# Patient Record
Sex: FEMALE | Race: WHITE | NOT HISPANIC OR LATINO | Employment: PART TIME | URBAN - METROPOLITAN AREA
[De-identification: names, ages, dates, MRNs, and addresses within clinical notes are randomized per-mention and may not be internally consistent; named-entity substitution may affect disease eponyms.]

---

## 2017-12-22 ENCOUNTER — ALLSCRIPTS OFFICE VISIT (OUTPATIENT)
Dept: OTHER | Facility: OTHER | Age: 32
End: 2017-12-22

## 2017-12-22 DIAGNOSIS — R05.9 COUGH: ICD-10-CM

## 2017-12-24 ENCOUNTER — APPOINTMENT (OUTPATIENT)
Dept: RADIOLOGY | Facility: MEDICAL CENTER | Age: 32
End: 2017-12-24
Payer: COMMERCIAL

## 2017-12-24 ENCOUNTER — TRANSCRIBE ORDERS (OUTPATIENT)
Dept: ADMINISTRATIVE | Facility: HOSPITAL | Age: 32
End: 2017-12-24

## 2017-12-24 DIAGNOSIS — R05.9 COUGH: ICD-10-CM

## 2017-12-24 PROCEDURE — 71020 HB CHEST X-RAY 2VW FRONTAL&LATL: CPT

## 2017-12-27 NOTE — PROGRESS NOTES
Assessment   1  Anterior pleuritic pain (786 52) (R07 81)   2  Cough (786 2) (R05)    Plan   Anterior pleuritic pain, Cough    · Dulera 100-5 MCG/ACT Inhalation Aerosol; INHALE 1 PUFFS Twice daily  Cough    · * XR CHEST PA & LATERAL; Status:Resulted - Requires Verification;   Done: [id number]    02:52PM  Flu vaccine need    · Stop: Fluzone Quadrivalent Intramuscular Suspension    Discussion/Summary      Patient is here for concern of cough for greater than one month and pain in upper left chest with deep breathes  I gave her a Dulera inhaler to use - two puffs twice a day  She can take Alleve twice a day  I also ordered an xray to r/o pneumonia  The treatment plan was reviewed with the patient/guardian  The patient/guardian understands and agrees with the treatment plan      Chief Complaint   1  Cold Symptoms  pt complains of chest congestion/cough and states her lungs hurt for about a week      History of Present Illness   HPI: Patient is her for cough and pain on chest  Had a cold, then went into her lungs  Cough has improved  Now she has pain on the upper chest on the left - is sore when she lays down or taking a deep breath  Cold Symptoms: [address] presents with complaints of cold symptoms  Associated symptoms include dry cough  Review of Systems        Constitutional: No fever, no chills, feels well, no tiredness, no recent weight gain or loss  Cardiovascular: no complaints of slow or fast heart rate, no chest pain, no palpitations, no leg claudication or lower extremity edema  Respiratory: cough-- and-- chest pain, but-- as noted in HPI  Gastrointestinal: no complaints of abdominal pain, no constipation, no nausea or diarrhea, no vomiting, no bloody stools  Musculoskeletal: no complaints of arthralgia, no myalgia, no joint swelling or stiffness, no limb pain or swelling  Integumentary: no complaints of skin rash or lesion, no itching or dry skin, no skin wounds  Active Problems   1  Chronic tension headaches (339 12) (G44 229)   2  Depression (311) (F32 9)   3  Fatigue (780 79) (R53 83)   4  Hair loss (704 00) (L65 9)    Past Medical History   1  History of Acute otitis externa, unspecified laterality   2  History of Anxiety (300 00) (F41 9)   3  History of Candidiasis (112 9) (B37 9)   4  History of Cold intolerance (780 99) (R68 89)   5  History of Ear itching (698 9) (L29 9)   6  History of Encounter for fetal anatomic survey (V28 81) (Z36 89)   7  History of Encounter for routine gynecological examination (V72 31) (Z01 419)   8  History of Flu vaccine need (V04 81) (Z23)   9  History of allergy (V15 09) (Z88 9)   10  History of headache (V13 89) (Z87 898)   11  History of pregnancy (V13 29)   12  History of Pelvic pressure in female (625 8) (R10 2)   13  History of Postpartum exam (V24 2) (Z39 2)   14  History of Screening, , for risk of pre-term labor (V28 82) (Z36 86)   15  Suspected problem with fetal growth not found (V89 04) (Z03 74)   16  History of Vomiting During Pregnancy (643 90)  Active Problems And Past Medical History Reviewed: The active problems and past medical history were reviewed and updated today  Family History   Mother    1  Family history of Allergies  Father    2  Family history of Skin Cancer (V16 8)  Family History    3  Family history of Psychiatric Disorders    Social History    · Denied: History of Alcohol Use (History)   · Denied: History of Drug Use   · Never A Smoker  The social history was reviewed and updated today  The social history was reviewed and is unchanged  Surgical History   1  History of Excision Of Baker's Cyst   2  History of Oral Surgery Tooth Extraction    Current Meds      The medication list was reviewed and updated today  Allergies   1  Ceclor CAPS  2  No Known Environmental Allergies   3   No Known Food Allergies    Vitals    Recorded: [id number] 02:50PM   Temperature 98 3 F, Tympanic   Heart Rate 98   Pulse Quality Normal   Respiration Quality Normal   Respiration 17   Systolic 117, LUE, Sitting   Diastolic 70, LUE, Sitting   Height 5 ft 5 5 in   Weight 140 lb 7 oz   BMI Calculated 23 01   BSA Calculated 1 71   O2 Saturation 98   LMP [id number]   Pain Scale 3     Physical Exam        Constitutional      General appearance: No acute distress, well appearing and well nourished  Ears, Nose, Mouth, and Throat      Otoscopic examination: Tympanic membranes translucent with normal light reflex  Canals patent without erythema  Oropharynx: Normal with no erythema, edema, exudate or lesions  Pulmonary      Respiratory effort: No increased work of breathing or signs of respiratory distress  Auscultation of lungs: Clear to auscultation  Cardiovascular      Auscultation of heart: Normal rate and rhythm, normal S1 and S2, without murmurs  Examination of extremities for edema and/or varicosities: Normal        Carotid pulses: Normal        Lymphatic      Palpation of lymph nodes in neck: No lymphadenopathy  Musculoskeletal      Gait and station: Normal        Neurologic      Reflexes: 2+ and symmetric  Psychiatric      Orientation to person, place, and time: Normal        Mood and affect: Normal           Results/Data   * XR CHEST PA & LATERAL [id number] 02:52PM [employer] Order Number: [id number]      Performing Comments: Cough greater than one month  Pain in anterior upper left chest       R/O PNEUMONIA      Test Name Result Flag Reference   XR CHEST PA & LATERAL (Report)     CHEST - DUAL ENERGY           INDICATION: 75-year-old female, cough for several days      COMPARISON: None           VIEWS: PA (including soft tissue/bone algorithms) and lateral projections; 4 images           FINDINGS:           The lungs are clear  No pleural effusions  The cardiomediastinal silhouette is unremarkable  Bony thorax is unremarkable                  IMPRESSION: No acute cardiopulmonary disease                Workstation performed: [id number]           Signed by:      Moises Caceres MD      12/24/17        Signatures    Electronically signed by : Ivonne Escalante DO; Dec 26 2017  7:59AM EST                       (Author)

## 2018-01-12 NOTE — MISCELLANEOUS
Provider Comments  Provider Comments:   1ST NO SHOW FOR NEUROLOGY APPOINTMENT  NO CALL,NO MESSAGE RECEIVED  YOSI LEFT VOICE MESSAGE TO CALL BACK AND RESCHEDULE APPOINTMENT  1ST NO SHOW LETTER SENT OUT        Signatures   Electronically signed by : Kassandra Alvarado MD; Nov 7 2016 10:30AM EST                       (Co-participant)

## 2018-01-22 VITALS
BODY MASS INDEX: 22.57 KG/M2 | OXYGEN SATURATION: 98 % | DIASTOLIC BLOOD PRESSURE: 70 MMHG | HEART RATE: 98 BPM | WEIGHT: 140.44 LBS | TEMPERATURE: 98.3 F | HEIGHT: 66 IN | SYSTOLIC BLOOD PRESSURE: 110 MMHG | RESPIRATION RATE: 17 BRPM

## 2018-01-23 NOTE — RESULT NOTES
Verified Results  * XR CHEST PA & LATERAL [id number] 02:52PM [facility] Order Number: [id number]   Performing Comments: Cough greater than one month  Pain in anterior upper left chest    R/O PNEUMONIA     Test Name Result Flag Reference   XR CHEST PA & LATERAL (Report)     CHEST - DUAL ENERGY     INDICATION: 25-year-old female, cough for several days   COMPARISON: None     VIEWS: PA (including soft tissue/bone algorithms) and lateral projections; 4 images     FINDINGS:     The lungs are clear  No pleural effusions  The cardiomediastinal silhouette is unremarkable  Bony thorax is unremarkable         IMPRESSION:     No acute cardiopulmonary disease       Workstation performed: [id number]     Signed by:   Fili Blair MD   12/24/17       Plan  Anterior pleuritic pain, Cough    · Dulera 100-5 MCG/ACT Inhalation Aerosol; INHALE 1 PUFFS Twice daily  Cough    · * XR CHEST PA & LATERAL; Status:Complete;   Done: [id number] 02:52PM  Flu vaccine need    · Stop: Fluzone Quadrivalent Intramuscular Suspension

## 2018-08-10 ENCOUNTER — CLINICAL SUPPORT (OUTPATIENT)
Dept: OBGYN CLINIC | Facility: MEDICAL CENTER | Age: 33
End: 2018-08-10
Payer: COMMERCIAL

## 2018-08-10 DIAGNOSIS — Z29.13 NEED FOR RHOGAM DUE TO RH NEGATIVE MOTHER: ICD-10-CM

## 2018-08-10 DIAGNOSIS — Z32.01 POSITIVE PREGNANCY TEST: Primary | ICD-10-CM

## 2018-08-10 DIAGNOSIS — O20.9 BLEEDING IN EARLY PREGNANCY: ICD-10-CM

## 2018-08-10 PROCEDURE — 36415 COLL VENOUS BLD VENIPUNCTURE: CPT | Performed by: OBSTETRICS & GYNECOLOGY

## 2018-08-10 PROCEDURE — 90471 IMMUNIZATION ADMIN: CPT | Performed by: OBSTETRICS & GYNECOLOGY

## 2018-08-10 NOTE — PROGRESS NOTES
LMP 7/1/18  C/o light-moderate bleeding with minor cramping starting 8/8/18  RH negative    Pt given Rhogam  Hcg, progesterone and cbc drawn today  Bleeding precautions reviewed  Aware will f/u next week

## 2018-08-13 LAB
B-HCG SERPL-ACNC: 15 MIU/ML
BASOPHILS # BLD AUTO: 50 CELLS/UL (ref 0–200)
BASOPHILS NFR BLD AUTO: 1 %
EOSINOPHIL # BLD AUTO: 190 CELLS/UL (ref 15–500)
EOSINOPHIL NFR BLD AUTO: 3.8 %
ERYTHROCYTE [DISTWIDTH] IN BLOOD BY AUTOMATED COUNT: 13 % (ref 11–15)
HCT VFR BLD AUTO: 38.3 % (ref 35–45)
HGB BLD-MCNC: 13.1 G/DL (ref 11.7–15.5)
LYMPHOCYTES # BLD AUTO: 2265 CELLS/UL (ref 850–3900)
LYMPHOCYTES NFR BLD AUTO: 45.3 %
MCH RBC QN AUTO: 30.4 PG (ref 27–33)
MCHC RBC AUTO-ENTMCNC: 34.2 G/DL (ref 32–36)
MCV RBC AUTO: 88.9 FL (ref 80–100)
MONOCYTES # BLD AUTO: 410 CELLS/UL (ref 200–950)
MONOCYTES NFR BLD AUTO: 8.2 %
NEUTROPHILS # BLD AUTO: 2085 CELLS/UL (ref 1500–7800)
NEUTROPHILS NFR BLD AUTO: 41.7 %
PLATELET # BLD AUTO: 180 THOUSAND/UL (ref 140–400)
PMV BLD REES-ECKER: 11.7 FL (ref 7.5–12.5)
PROGEST SERPL-MCNC: <0.5 NG/ML
RBC # BLD AUTO: 4.31 MILLION/UL (ref 3.8–5.1)
WBC # BLD AUTO: 5 THOUSAND/UL (ref 3.8–10.8)

## 2018-08-20 ENCOUNTER — CLINICAL SUPPORT (OUTPATIENT)
Dept: OBGYN CLINIC | Facility: MEDICAL CENTER | Age: 33
End: 2018-08-20
Payer: COMMERCIAL

## 2018-08-20 DIAGNOSIS — O03.9 SPONTANEOUS ABORTION: Primary | ICD-10-CM

## 2018-08-20 DIAGNOSIS — B37.3 VAGINAL YEAST INFECTION: ICD-10-CM

## 2018-08-20 PROCEDURE — 36415 COLL VENOUS BLD VENIPUNCTURE: CPT | Performed by: OBSTETRICS & GYNECOLOGY

## 2018-08-20 RX ORDER — FLUCONAZOLE 150 MG/1
150 TABLET ORAL ONCE
Qty: 1 TABLET | Refills: 0 | Status: CANCELLED | OUTPATIENT
Start: 2018-08-20 | End: 2018-08-20

## 2018-08-20 NOTE — PROGRESS NOTES
Here for repeat HCG level  States she had 2-3 days of minimal bleeding  None now    Also c/o symptoms of vaginal yeast infection and is requesting diflucan

## 2018-08-21 DIAGNOSIS — B37.3 VAGINAL YEAST INFECTION: Primary | ICD-10-CM

## 2018-08-21 LAB — B-HCG SERPL-ACNC: <2 MIU/ML

## 2018-08-21 RX ORDER — FLUCONAZOLE 150 MG/1
150 TABLET ORAL ONCE
Qty: 1 TABLET | Refills: 0 | Status: SHIPPED | OUTPATIENT
Start: 2018-08-21 | End: 2018-08-21

## 2018-09-11 ENCOUNTER — TELEPHONE (OUTPATIENT)
Dept: OBGYN CLINIC | Facility: CLINIC | Age: 33
End: 2018-09-11

## 2018-09-11 DIAGNOSIS — N91.2 AMENORRHEA: Primary | ICD-10-CM

## 2018-09-11 NOTE — TELEPHONE ENCOUNTER
Pt will have hcg level done, prior to scheduling appt to determine approx gestational age    Unknown LMP r/t SAB in August

## 2018-09-12 ENCOUNTER — APPOINTMENT (OUTPATIENT)
Dept: LAB | Facility: CLINIC | Age: 33
End: 2018-09-12
Payer: COMMERCIAL

## 2018-09-12 DIAGNOSIS — N91.2 AMENORRHEA: ICD-10-CM

## 2018-09-12 LAB
B-HCG SERPL-ACNC: 17 MIU/ML
PROGEST SERPL-MCNC: 1.2 NG/ML

## 2018-09-12 PROCEDURE — 84144 ASSAY OF PROGESTERONE: CPT

## 2018-09-12 PROCEDURE — 36415 COLL VENOUS BLD VENIPUNCTURE: CPT

## 2018-09-12 PROCEDURE — 84702 CHORIONIC GONADOTROPIN TEST: CPT

## 2018-09-13 ENCOUNTER — TELEPHONE (OUTPATIENT)
Dept: OBGYN CLINIC | Facility: CLINIC | Age: 33
End: 2018-09-13

## 2018-09-13 DIAGNOSIS — O03.9 MISCARRIAGE: Primary | ICD-10-CM

## 2018-09-13 NOTE — TELEPHONE ENCOUNTER
Pt would like test results   Has started bleeding heavily and thinks consistent with another miscarriage

## 2018-09-13 NOTE — TELEPHONE ENCOUNTER
Reviewed with patient  Will repeat labs today or tomorrow and [facility]  Pt aware Cameron Leavitt will call her tomorrow as soon as it arrives so that we can get her in the office for the injection

## 2018-09-14 ENCOUNTER — APPOINTMENT (OUTPATIENT)
Dept: LAB | Facility: CLINIC | Age: 33
End: 2018-09-14
Payer: COMMERCIAL

## 2018-09-14 ENCOUNTER — ROUTINE PRENATAL (OUTPATIENT)
Dept: OBGYN CLINIC | Facility: CLINIC | Age: 33
End: 2018-09-14
Payer: COMMERCIAL

## 2018-09-14 VITALS
HEIGHT: 66 IN | BODY MASS INDEX: 22.82 KG/M2 | WEIGHT: 142 LBS | DIASTOLIC BLOOD PRESSURE: 70 MMHG | SYSTOLIC BLOOD PRESSURE: 118 MMHG

## 2018-09-14 DIAGNOSIS — O03.9 MISCARRIAGE: Primary | ICD-10-CM

## 2018-09-14 DIAGNOSIS — O03.9 MISCARRIAGE: ICD-10-CM

## 2018-09-14 LAB
ABO GROUP BLD: NORMAL
B-HCG SERPL-ACNC: 7 MIU/ML
BLD GP AB SCN SERPL QL: POSITIVE
RH BLD: NEGATIVE
SPECIMEN EXPIRATION DATE: NORMAL

## 2018-09-14 PROCEDURE — 86900 BLOOD TYPING SEROLOGIC ABO: CPT

## 2018-09-14 PROCEDURE — 96372 THER/PROPH/DIAG INJ SC/IM: CPT | Performed by: OBSTETRICS & GYNECOLOGY

## 2018-09-14 PROCEDURE — 84702 CHORIONIC GONADOTROPIN TEST: CPT

## 2018-09-14 PROCEDURE — 36415 COLL VENOUS BLD VENIPUNCTURE: CPT

## 2018-09-14 PROCEDURE — 86901 BLOOD TYPING SEROLOGIC RH(D): CPT

## 2018-09-14 PROCEDURE — 86850 RBC ANTIBODY SCREEN: CPT

## 2018-09-14 PROCEDURE — 86870 RBC ANTIBODY IDENTIFICATION: CPT

## 2018-09-14 NOTE — PROGRESS NOTES
Patient seen for Rhogam  Patient had questions after Rhogam injection  States with previous loss only had 2 days of light bleeding but quants decreased down to 0  This time has been bleeding more like her typical menses for the last 2 days  Reassured quant only 17, more likely chemical pregnancy and this may be menstrual like cycle  Reviewed to only use pads and to call if bleeding through pad every 30 min  Reports very dull pain in lower left back started today, occ pain in shoulder, was concerned about ectopic  Reassured quant only 17  Repeat quant today 7  May be corpus luteum cyst, reports pain is mild at this time and no urinary symptoms  Continue to monitor and call office or go to ER if pain suddenly worsens  Patient has follow up appt with Miah Reno for preconception counseling as this is her 2nd loss in a row, 3rd loss total and took her 7 months to conceive with this pregnancy

## 2018-09-15 LAB — BLOOD GROUP ANTIBODIES SERPL: NORMAL

## 2018-09-20 ENCOUNTER — APPOINTMENT (OUTPATIENT)
Dept: LAB | Facility: CLINIC | Age: 33
End: 2018-09-20
Payer: COMMERCIAL

## 2018-09-20 DIAGNOSIS — O03.9 MISCARRIAGE: ICD-10-CM

## 2018-09-20 LAB — B-HCG SERPL-ACNC: <2 MIU/ML

## 2018-09-20 PROCEDURE — 84702 CHORIONIC GONADOTROPIN TEST: CPT

## 2018-09-20 PROCEDURE — 36415 COLL VENOUS BLD VENIPUNCTURE: CPT

## 2018-10-01 ENCOUNTER — OFFICE VISIT (OUTPATIENT)
Dept: OBGYN CLINIC | Facility: CLINIC | Age: 33
End: 2018-10-01
Payer: COMMERCIAL

## 2018-10-01 VITALS
WEIGHT: 141 LBS | SYSTOLIC BLOOD PRESSURE: 118 MMHG | DIASTOLIC BLOOD PRESSURE: 70 MMHG | HEIGHT: 66 IN | BODY MASS INDEX: 22.66 KG/M2

## 2018-10-01 DIAGNOSIS — N96 RECURRENT PREGNANCY LOSS: Primary | ICD-10-CM

## 2018-10-01 DIAGNOSIS — Z31.69 PRE-CONCEPTION COUNSELING: ICD-10-CM

## 2018-10-01 PROCEDURE — 99214 OFFICE O/P EST MOD 30 MIN: CPT | Performed by: PHYSICIAN ASSISTANT

## 2018-10-01 NOTE — PROGRESS NOTES
Assessment/Plan:    No problem-specific Assessment & Plan notes found for this encounter  Diagnoses and all orders for this visit:    Recurrent pregnancy loss  -     Antimullerian hormone (AMH); Future  -     Estradiol; Future  -     Follicle stimulating hormone; Future  -     Luteinizing hormone; Future  -     T4, free; Future  -     TSH, 3rd generation; Future  -     Progesterone; Future  -     Estradiol; Future  -     Progesterone; Future    Pre-conception counseling          Subjective:      Patient ID: Melba Lopez is a 26 y o  female  Pt for discussion of pregnancy and 2 recent losses  Pt  SVDx 5 3/07, , 7/10, ,  then SAB x 3 , ,  Had been not stopping pregnancy for approx 12 mths, then actively trying for pregnancy for another 7 mths  before pregnancy and loss in August    Periods have been regular q 28-29 days, was tracking w clear blue easy monitor but did not seem to ovulate every month  Did only seem to ovulate approx 2 mths in the 7 mths prior to August  Flow tolerable, heavy day 2 and 3 then tapers  Was using NFP for years prior  Current partner has fathered all of her previous pregnancies  He works as an , w mostly office work  She works as a birth  and   All previous pregnancies uneventful, easy conception and easy pregnancies/deliveries per pt  The following portions of the patient's history were reviewed and updated as appropriate: current medications, past family history, past medical history, past social history, past surgical history and problem list     Review of Systems   Constitutional: Negative for fatigue, fever and unexpected weight change  HENT: Positive for sinus pain  Negative for dental problem, mouth sores, nosebleeds, rhinorrhea, sinus pressure and sore throat  Eyes: Negative for pain, discharge and visual disturbance     Respiratory: Negative for cough, chest tightness, shortness of breath and wheezing  Cardiovascular: Negative for chest pain, palpitations and leg swelling  Gastrointestinal: Negative for blood in stool, constipation, diarrhea, nausea and vomiting  Endocrine: Negative for polydipsia  Genitourinary: Negative for difficulty urinating, dyspareunia, dysuria, menstrual problem, pelvic pain, urgency, vaginal discharge and vaginal pain  Musculoskeletal: Positive for back pain  Negative for arthralgias and joint swelling  Allergic/Immunologic: Negative for environmental allergies  Neurological: Positive for headaches  Negative for seizures and light-headedness  Hematological: Does not bruise/bleed easily  Psychiatric/Behavioral: Negative for sleep disturbance  The patient is not nervous/anxious  Objective:      /70 (BP Location: Left arm, Patient Position: Sitting, Cuff Size: Standard)   Ht 5' 6" (1 676 m)   Wt 64 kg (141 lb)   BMI 22 76 kg/m²          Physical Exam   Constitutional: She is oriented to person, place, and time  She appears well-developed and well-nourished  Neurological: She is alert and oriented to person, place, and time  Skin: Skin is warm and dry  Psychiatric: She has a normal mood and affect   Her behavior is normal  Judgment and thought content normal

## 2018-10-01 NOTE — PATIENT INSTRUCTIONS
Will plan day 3 and day 21 labs w October cycle  Will then plan APE in Nov to review labs and determine plan  I did r/w pt option to check SA as well as hypercoag panel and kayotyping now that she has had 3 early losses  Loss in 2011 was at 6 5 weeks

## 2018-10-09 ENCOUNTER — APPOINTMENT (OUTPATIENT)
Dept: LAB | Facility: CLINIC | Age: 33
End: 2018-10-09
Payer: COMMERCIAL

## 2018-10-09 DIAGNOSIS — Z3A.01 LESS THAN 8 WEEKS GESTATION OF PREGNANCY: ICD-10-CM

## 2018-10-09 DIAGNOSIS — N91.2 AMENORRHEA: ICD-10-CM

## 2018-10-09 DIAGNOSIS — N91.2 AMENORRHEA: Primary | ICD-10-CM

## 2018-10-09 LAB
B-HCG SERPL-ACNC: 21 MIU/ML
PROGEST SERPL-MCNC: 26.8 NG/ML

## 2018-10-09 PROCEDURE — 84702 CHORIONIC GONADOTROPIN TEST: CPT

## 2018-10-09 PROCEDURE — 36415 COLL VENOUS BLD VENIPUNCTURE: CPT

## 2018-10-09 PROCEDURE — 84144 ASSAY OF PROGESTERONE: CPT

## 2018-10-10 DIAGNOSIS — Z34.90 PREGNANCY AT EARLY STAGE: Primary | ICD-10-CM

## 2018-10-11 ENCOUNTER — APPOINTMENT (OUTPATIENT)
Dept: LAB | Facility: CLINIC | Age: 33
End: 2018-10-11
Payer: COMMERCIAL

## 2018-10-11 DIAGNOSIS — Z34.90 PREGNANCY AT EARLY STAGE: ICD-10-CM

## 2018-10-11 LAB — B-HCG SERPL-ACNC: 29 MIU/ML

## 2018-10-11 PROCEDURE — 84702 CHORIONIC GONADOTROPIN TEST: CPT

## 2018-10-11 PROCEDURE — 36415 COLL VENOUS BLD VENIPUNCTURE: CPT

## 2018-10-12 ENCOUNTER — TELEPHONE (OUTPATIENT)
Dept: OBGYN CLINIC | Facility: CLINIC | Age: 33
End: 2018-10-12

## 2018-10-12 DIAGNOSIS — Z3A.01 LESS THAN 8 WEEKS GESTATION OF PREGNANCY: Primary | ICD-10-CM

## 2018-10-12 NOTE — TELEPHONE ENCOUNTER
Reviewed results with patient, reviewed ectopic precautions   Will repeat labs Monday and call for results

## 2018-10-15 ENCOUNTER — APPOINTMENT (OUTPATIENT)
Dept: LAB | Facility: CLINIC | Age: 33
End: 2018-10-15
Payer: COMMERCIAL

## 2018-10-15 DIAGNOSIS — O03.9 SPONTANEOUS ABORTION: Primary | ICD-10-CM

## 2018-10-15 DIAGNOSIS — Z3A.01 LESS THAN 8 WEEKS GESTATION OF PREGNANCY: ICD-10-CM

## 2018-10-15 LAB — B-HCG SERPL-ACNC: 6 MIU/ML

## 2018-10-15 PROCEDURE — 84702 CHORIONIC GONADOTROPIN TEST: CPT

## 2018-10-15 PROCEDURE — 36415 COLL VENOUS BLD VENIPUNCTURE: CPT

## 2018-10-18 ENCOUNTER — APPOINTMENT (OUTPATIENT)
Dept: LAB | Facility: CLINIC | Age: 33
End: 2018-10-18
Payer: COMMERCIAL

## 2018-10-18 DIAGNOSIS — O03.9 SPONTANEOUS ABORTION: ICD-10-CM

## 2018-10-18 DIAGNOSIS — N96 RECURRENT PREGNANCY LOSS: ICD-10-CM

## 2018-10-18 LAB
B-HCG SERPL-ACNC: 2 MIU/ML
ESTRADIOL SERPL-MCNC: 32 PG/ML
FSH SERPL-ACNC: 8.6 MIU/ML
LH SERPL-ACNC: 3 MIU/ML
PROGEST SERPL-MCNC: 0.3 NG/ML
T4 FREE SERPL-MCNC: 1.13 NG/DL (ref 0.76–1.46)
TSH SERPL DL<=0.05 MIU/L-ACNC: 1.04 UIU/ML (ref 0.36–3.74)

## 2018-10-18 PROCEDURE — 84702 CHORIONIC GONADOTROPIN TEST: CPT

## 2018-10-18 PROCEDURE — 83516 IMMUNOASSAY NONANTIBODY: CPT

## 2018-10-18 PROCEDURE — 84144 ASSAY OF PROGESTERONE: CPT

## 2018-10-18 PROCEDURE — 36415 COLL VENOUS BLD VENIPUNCTURE: CPT

## 2018-10-18 PROCEDURE — 83001 ASSAY OF GONADOTROPIN (FSH): CPT

## 2018-10-18 PROCEDURE — 83002 ASSAY OF GONADOTROPIN (LH): CPT

## 2018-10-18 PROCEDURE — 84443 ASSAY THYROID STIM HORMONE: CPT

## 2018-10-18 PROCEDURE — 82670 ASSAY OF TOTAL ESTRADIOL: CPT

## 2018-10-18 PROCEDURE — 84439 ASSAY OF FREE THYROXINE: CPT

## 2018-10-22 LAB — MIS SERPL-MCNC: 1.94 NG/ML

## 2018-11-03 PROBLEM — Z01.419 WELL WOMAN EXAM: Status: ACTIVE | Noted: 2018-11-03

## 2018-11-03 NOTE — PROGRESS NOTES
Assessment/Plan   Diagnoses and all orders for this visit:    Well woman exam        Discussion    All questions have been answered to her satisfaction  RTO for APE or sooner if needed      Subjective     Pt for annual GYN exam  S/p another chemical pregnancy last month, no normal cycle yet since  Pt is using NFP and has avoided IC in fertile period this month  Pt has decided at this point to defer the hypercoag panel and karyotype but does desire MTHFR mutation screening  Pt also questions pelvic US  She will plan to do both in the next few weeks  Pt denies vaginal d/c or GYN complaints  No problems w IC   and monogamous declines STD work up  Does have h/o HPV lesions in first pregnancy  Pedro Rincon is a 26 y o  female who presents for annual well woman exam    Menarche -13 ; LMP - 10/16/18; Periods are reg q  days and last  days; No excessive bleeding; No intermenstrual bleeding or spotting; Cramps are tolerable  No vulvar itch/burn; No vaginal itch/burn; No abn discharge or odor; No urinary sx - burning/pain/frequency/hematuria  (+) SBEs - no breast masses, asymmetry, nipple discharge or bleeding, changes in skin of breast, or breast tenderness bilaterally  No abd/pelvic pain or HAs; No menopausal symptoms: No hot flashes/night sweats, problems with intercourse, vaginal dryness; sleeping well;   Pt is sexually active in a mutually monog/ sexual relationship; No issues with intercourse; She declines std/hiv/hep testing; Feels safe at home  Current contraception: none    (+) PCP for routine Bw/care; Last Pap - 2014  History of abnormal Pap smear: WNL     Review of Systems   Constitutional: Negative for fatigue, fever and unexpected weight change  HENT: Negative for dental problem, mouth sores, nosebleeds, rhinorrhea, sinus pain, sinus pressure and sore throat  Eyes: Negative for pain, discharge and visual disturbance     Respiratory: Negative for cough, chest tightness, shortness of breath and wheezing  Cardiovascular: Negative for chest pain, palpitations and leg swelling  Gastrointestinal: Negative for blood in stool, constipation, diarrhea, nausea and vomiting  Endocrine: Negative for polydipsia  Genitourinary: Negative for difficulty urinating, dyspareunia, dysuria, menstrual problem, pelvic pain, urgency, vaginal discharge and vaginal pain  Musculoskeletal: Negative for arthralgias, back pain and joint swelling  Allergic/Immunologic: Negative for environmental allergies  Neurological: Negative for seizures, light-headedness and headaches  Hematological: Does not bruise/bleed easily  Psychiatric/Behavioral: Negative for sleep disturbance  The patient is not nervous/anxious  The following portions of the patient's history were reviewed and updated as appropriate: allergies, current medications, past family history, past medical history, past social history, past surgical history and problem list          OB History      Para Term  AB Living    8 5     3      SAB TAB Ectopic Multiple Live Births    3       5          Past Medical History:   Diagnosis Date    Recurrent pregnancy loss        Past Surgical History:   Procedure Laterality Date    CYST REMOVAL      WISDOM TOOTH EXTRACTION         Family History   Problem Relation Age of Onset    Colon cancer Father        Social History     Social History    Marital status: /Civil Union     Spouse name: N/A    Number of children: N/A    Years of education: N/A     Occupational History    Not on file  Social History Main Topics    Smoking status: Never Smoker    Smokeless tobacco: Never Used    Alcohol use No    Drug use: No    Sexual activity: Yes     Partners: Male     Birth control/ protection: None     Other Topics Concern    Not on file     Social History Narrative    No narrative on file       No current outpatient prescriptions on file      Allergies not on file    Objective   There were no vitals filed for this visit  Physical Exam   Constitutional: She is oriented to person, place, and time  She appears well-developed and well-nourished  HENT:   Head: Normocephalic and atraumatic  Cardiovascular: Normal rate and regular rhythm  Pulmonary/Chest: Effort normal and breath sounds normal  Right breast exhibits no inverted nipple, no mass, no nipple discharge, no skin change and no tenderness  Left breast exhibits no inverted nipple, no mass, no nipple discharge, no skin change and no tenderness  Breasts are symmetrical    Abdominal: Soft  She exhibits no distension and no mass  There is no rebound and no guarding  Genitourinary: Vagina normal and uterus normal          Neurological: She is alert and oriented to person, place, and time  Skin: Skin is warm and dry  Psychiatric: She has a normal mood and affect  There are no Patient Instructions on file for this visit

## 2018-11-05 ENCOUNTER — ANNUAL EXAM (OUTPATIENT)
Dept: OBGYN CLINIC | Facility: CLINIC | Age: 33
End: 2018-11-05
Payer: COMMERCIAL

## 2018-11-05 VITALS
SYSTOLIC BLOOD PRESSURE: 108 MMHG | BODY MASS INDEX: 22.02 KG/M2 | WEIGHT: 137 LBS | HEIGHT: 66 IN | DIASTOLIC BLOOD PRESSURE: 68 MMHG

## 2018-11-05 DIAGNOSIS — Z01.419 WELL WOMAN EXAM: Primary | ICD-10-CM

## 2018-11-05 DIAGNOSIS — N96 RECURRENT PREGNANCY LOSS WITHOUT CURRENT PREGNANCY: ICD-10-CM

## 2018-11-05 PROCEDURE — S0612 ANNUAL GYNECOLOGICAL EXAMINA: HCPCS | Performed by: PHYSICIAN ASSISTANT

## 2018-11-05 NOTE — PATIENT INSTRUCTIONS
Will plan BW and US and f/u as needed based on results  Will call with another + UPT and plan quant

## 2018-11-07 LAB
HPV HR 12 DNA CVX QL NAA+PROBE: NOT DETECTED
HPV16 DNA SPEC QL NAA+PROBE: NOT DETECTED
HPV18 DNA SPEC QL NAA+PROBE: NOT DETECTED
THIN PREP CVX: NORMAL

## 2019-02-11 ENCOUNTER — APPOINTMENT (OUTPATIENT)
Dept: LAB | Facility: MEDICAL CENTER | Age: 34
End: 2019-02-11
Payer: COMMERCIAL

## 2019-02-11 DIAGNOSIS — N96 RECURRENT PREGNANCY LOSS: Primary | ICD-10-CM

## 2019-02-11 DIAGNOSIS — Z32.01 POSITIVE URINE PREGNANCY TEST: ICD-10-CM

## 2019-02-11 DIAGNOSIS — N96 RECURRENT PREGNANCY LOSS WITHOUT CURRENT PREGNANCY: ICD-10-CM

## 2019-02-11 DIAGNOSIS — N96 RECURRENT PREGNANCY LOSS: ICD-10-CM

## 2019-02-11 DIAGNOSIS — Z32.01 POSITIVE URINE PREGNANCY TEST: Primary | ICD-10-CM

## 2019-02-11 LAB
B-HCG SERPL-ACNC: 54 MIU/ML
PROGEST SERPL-MCNC: 26.6 NG/ML

## 2019-02-11 PROCEDURE — 84702 CHORIONIC GONADOTROPIN TEST: CPT

## 2019-02-11 PROCEDURE — 81291 MTHFR GENE: CPT

## 2019-02-11 PROCEDURE — 36415 COLL VENOUS BLD VENIPUNCTURE: CPT

## 2019-02-11 PROCEDURE — 84144 ASSAY OF PROGESTERONE: CPT

## 2019-02-11 NOTE — PROGRESS NOTES
Pt had a positive urine pregnancy test  Pt stated she had hx of loss  Sent pt for HCG and progesterone  Sophia Daniel scheduled initial OB

## 2019-02-13 ENCOUNTER — TELEPHONE (OUTPATIENT)
Dept: OBGYN CLINIC | Facility: CLINIC | Age: 34
End: 2019-02-13

## 2019-02-13 DIAGNOSIS — Z3A.01 LESS THAN 8 WEEKS GESTATION OF PREGNANCY: Primary | ICD-10-CM

## 2019-02-14 ENCOUNTER — APPOINTMENT (OUTPATIENT)
Dept: LAB | Facility: MEDICAL CENTER | Age: 34
End: 2019-02-14
Payer: COMMERCIAL

## 2019-02-14 DIAGNOSIS — Z3A.01 LESS THAN 8 WEEKS GESTATION OF PREGNANCY: ICD-10-CM

## 2019-02-14 LAB — B-HCG SERPL-ACNC: 91 MIU/ML

## 2019-02-14 PROCEDURE — 36415 COLL VENOUS BLD VENIPUNCTURE: CPT

## 2019-02-14 PROCEDURE — 84702 CHORIONIC GONADOTROPIN TEST: CPT

## 2019-02-15 DIAGNOSIS — N96 RECURRENT PREGNANCY LOSS: Primary | ICD-10-CM

## 2019-02-15 DIAGNOSIS — Z3A.01 LESS THAN 8 WEEKS GESTATION OF PREGNANCY: ICD-10-CM

## 2019-02-18 ENCOUNTER — APPOINTMENT (OUTPATIENT)
Dept: LAB | Facility: MEDICAL CENTER | Age: 34
End: 2019-02-18
Payer: COMMERCIAL

## 2019-02-18 DIAGNOSIS — N96 RECURRENT PREGNANCY LOSS: ICD-10-CM

## 2019-02-18 DIAGNOSIS — Z3A.01 LESS THAN 8 WEEKS GESTATION OF PREGNANCY: ICD-10-CM

## 2019-02-18 LAB — B-HCG SERPL-ACNC: 320 MIU/ML

## 2019-02-18 PROCEDURE — 36415 COLL VENOUS BLD VENIPUNCTURE: CPT

## 2019-02-18 PROCEDURE — 84702 CHORIONIC GONADOTROPIN TEST: CPT

## 2019-02-19 DIAGNOSIS — N96 RECURRENT PREGNANCY LOSS: Primary | ICD-10-CM

## 2019-02-19 LAB — MTHFR GENE MUT ANL BLD/T: NORMAL

## 2019-02-20 DIAGNOSIS — Z15.89 MTHFR MUTATION: Primary | ICD-10-CM

## 2019-02-25 ENCOUNTER — APPOINTMENT (OUTPATIENT)
Dept: LAB | Facility: MEDICAL CENTER | Age: 34
End: 2019-02-25
Payer: COMMERCIAL

## 2019-02-25 DIAGNOSIS — N96 RECURRENT PREGNANCY LOSS: ICD-10-CM

## 2019-02-25 DIAGNOSIS — Z15.89 MTHFR MUTATION: ICD-10-CM

## 2019-02-25 LAB
B-HCG SERPL-ACNC: 2435 MIU/ML
HCYS SERPL-SCNC: 4.4 UMOL/L (ref 3.7–11.2)

## 2019-02-25 PROCEDURE — 36415 COLL VENOUS BLD VENIPUNCTURE: CPT

## 2019-02-25 PROCEDURE — 84702 CHORIONIC GONADOTROPIN TEST: CPT

## 2019-02-25 PROCEDURE — 83090 ASSAY OF HOMOCYSTEINE: CPT

## 2019-03-04 ENCOUNTER — APPOINTMENT (OUTPATIENT)
Dept: LAB | Facility: MEDICAL CENTER | Age: 34
End: 2019-03-04
Payer: COMMERCIAL

## 2019-03-04 ENCOUNTER — ROUTINE PRENATAL (OUTPATIENT)
Dept: OBGYN CLINIC | Facility: CLINIC | Age: 34
End: 2019-03-04

## 2019-03-04 VITALS
BODY MASS INDEX: 22.34 KG/M2 | DIASTOLIC BLOOD PRESSURE: 78 MMHG | HEIGHT: 66 IN | SYSTOLIC BLOOD PRESSURE: 120 MMHG | WEIGHT: 139 LBS

## 2019-03-04 DIAGNOSIS — N96 RECURRENT PREGNANCY LOSS: Primary | ICD-10-CM

## 2019-03-04 DIAGNOSIS — N96 RECURRENT PREGNANCY LOSS: ICD-10-CM

## 2019-03-04 LAB
B-HCG SERPL-ACNC: 8545 MIU/ML
PROGEST SERPL-MCNC: 14.6 NG/ML

## 2019-03-04 PROCEDURE — 84144 ASSAY OF PROGESTERONE: CPT

## 2019-03-04 PROCEDURE — PNV: Performed by: PHYSICIAN ASSISTANT

## 2019-03-04 PROCEDURE — 36415 COLL VENOUS BLD VENIPUNCTURE: CPT

## 2019-03-04 PROCEDURE — 84702 CHORIONIC GONADOTROPIN TEST: CPT

## 2019-03-04 NOTE — PROGRESS NOTES
Assessment/Plan:    No problem-specific Assessment & Plan notes found for this encounter  Diagnoses and all orders for this visit:    Recurrent pregnancy loss  -     hCG, quantitative; Future  -     Progesterone; Future  -     Ambulatory Referral to Maternal Fetal Medicine; Future  -     AMB US OB < 14 weeks single or first gestation level 1          Subjective:      Patient ID: Ruma Fowler is a 26 y o  female  Pt for via scan due to h o RPL early on in pregnancy  She has been having serial quants done w results as follows:  2/11 54, prog 26  2/14 91  2/18 320  2/25 4875    Pt was also found to be + homozygous for MTHFR mutation  On 800 mg 5 MTHF                      The following portions of the patient's history were reviewed and updated as appropriate: current medications, past family history, past medical history, past social history, past surgical history and problem list     Review of Systems      Objective:      /78 (BP Location: Left arm, Cuff Size: Standard)   Ht 5' 6" (1 676 m)   Wt 63 kg (139 lb)   LMP 01/11/2019   BMI 22 44 kg/m²          Physical Exam   Constitutional: She appears well-developed and well-nourished  Skin: Skin is warm and dry  Psychiatric: She has a normal mood and affect  Her behavior is normal  Judgment and thought content normal          TVUS + CRL + YS no FHR noted  Size < dayes by approx 10 days  Large [facility] noted on exam  Will plan PNC f u 1 week and quant and prog today  Patient Instructions   I did r/w pt and partner US results and some concerns I have base don history  Will plan to stop LDASA at this point due to [facility]  Will plan quant and prog today and f/u US next week at [facility]

## 2019-03-04 NOTE — PATIENT INSTRUCTIONS
I did r/w pt and partner US results and some concerns I have base don history  Will plan to stop LDASA at this point due to [facility]  Will plan quant and prog today and f/u US next week at [facility]

## 2019-03-12 ENCOUNTER — ULTRASOUND (OUTPATIENT)
Dept: PERINATAL CARE | Facility: CLINIC | Age: 34
End: 2019-03-12
Payer: COMMERCIAL

## 2019-03-12 VITALS
DIASTOLIC BLOOD PRESSURE: 76 MMHG | BODY MASS INDEX: 22.98 KG/M2 | HEART RATE: 106 BPM | WEIGHT: 143 LBS | SYSTOLIC BLOOD PRESSURE: 112 MMHG | HEIGHT: 66 IN

## 2019-03-12 DIAGNOSIS — O36.80X0 PREGNANCY WITH INCONCLUSIVE FETAL VIABILITY, SINGLE OR UNSPECIFIED FETUS: Primary | ICD-10-CM

## 2019-03-12 DIAGNOSIS — N96 RECURRENT PREGNANCY LOSS: ICD-10-CM

## 2019-03-12 DIAGNOSIS — O21.9 NAUSEA AND VOMITING DURING PREGNANCY: ICD-10-CM

## 2019-03-12 PROCEDURE — 99242 OFF/OP CONSLTJ NEW/EST SF 20: CPT | Performed by: OBSTETRICS & GYNECOLOGY

## 2019-03-12 PROCEDURE — 76801 OB US < 14 WKS SINGLE FETUS: CPT | Performed by: OBSTETRICS & GYNECOLOGY

## 2019-03-12 RX ORDER — ONDANSETRON 4 MG/1
4 TABLET, ORALLY DISINTEGRATING ORAL EVERY 8 HOURS SCHEDULED
Qty: 30 TABLET | Refills: 3 | Status: SHIPPED | OUTPATIENT
Start: 2019-03-12 | End: 2019-10-07 | Stop reason: ALTCHOICE

## 2019-03-12 NOTE — PROGRESS NOTES
[address]: Ms Lauren Mora was seen today at 7w1d for viability scan  5w4d fetus is seen with slow but present cardiac activity     See ultrasound report under "OB Procedures" tab  Please don't hesitate to contact our office with any concerns or questions    Evy Peters MD

## 2019-03-12 NOTE — LETTER
Name: Angelo Monaco  : 1985  MRN: [id number]    To:   Centralized Faxing Team [phone]      The attached documents are complete  Please scan and add into the patient's chart  No other action is needed at this time  Please call us with any questions at [phone]      Carine Rosenthal MD

## 2019-03-12 NOTE — PATIENT INSTRUCTIONS
Thank you for choosing [id number] for your  care today  If you have any questions about your ultrasound or care, please do not hesitate to contact us or your primary obstetrician  Any bleeding, please contact your doctors

## 2019-03-19 ENCOUNTER — TELEPHONE (OUTPATIENT)
Dept: OBGYN CLINIC | Facility: CLINIC | Age: 34
End: 2019-03-19

## 2019-03-19 ENCOUNTER — ULTRASOUND (OUTPATIENT)
Dept: PERINATAL CARE | Facility: CLINIC | Age: 34
End: 2019-03-19
Payer: COMMERCIAL

## 2019-03-19 VITALS
BODY MASS INDEX: 23.5 KG/M2 | SYSTOLIC BLOOD PRESSURE: 113 MMHG | WEIGHT: 146.2 LBS | DIASTOLIC BLOOD PRESSURE: 76 MMHG | HEART RATE: 89 BPM | HEIGHT: 66 IN

## 2019-03-19 DIAGNOSIS — O03.9 MISCARRIAGE: Primary | ICD-10-CM

## 2019-03-19 DIAGNOSIS — O09.521 ELDERLY MULTIGRAVIDA IN FIRST TRIMESTER: ICD-10-CM

## 2019-03-19 DIAGNOSIS — N96 RECURRENT PREGNANCY LOSS: ICD-10-CM

## 2019-03-19 PROCEDURE — 99213 OFFICE O/P EST LOW 20 MIN: CPT | Performed by: OBSTETRICS & GYNECOLOGY

## 2019-03-19 PROCEDURE — 76801 OB US < 14 WKS SINGLE FETUS: CPT | Performed by: OBSTETRICS & GYNECOLOGY

## 2019-03-19 PROCEDURE — 76817 TRANSVAGINAL US OBSTETRIC: CPT | Performed by: OBSTETRICS & GYNECOLOGY

## 2019-03-19 NOTE — PROGRESS NOTES
A transvaginal ultrasound was performed  Sonographer note on use of High Level Disinfection Process (Trophon) for transvaginal probe# 3 used, serial [id number]    Airam Watts, MARYMS

## 2019-03-19 NOTE — PROGRESS NOTES
[address]: Ms Vitaliy Mccain was seen today at [address] for followup viability ultrasound  Embryonic demise was noted  See ultrasound report under "OB Procedures" tab  Please don't hesitate to contact our office with any concerns or questions    Katt Valdovinos MD

## 2019-03-19 NOTE — TELEPHONE ENCOUNTER
Dr Carnes Leader called in to inform us regarding ultrasound for above patient  States is a confirmed loss  Patient is aware  CRL measuring 5wks, no heart beat  Reviewed options with patient and states patient will call us, is currently considering options  Thinks she will opt for time and possible medication if doesn't start bleeding on her own

## 2019-03-19 NOTE — PATIENT INSTRUCTIONS
I am so very sorry for your loss  I will reach out to your doctors regarding management    Any heavy bleeding, please contact your doctors and team

## 2019-03-25 ENCOUNTER — TELEPHONE (OUTPATIENT)
Dept: OBGYN CLINIC | Facility: CLINIC | Age: 34
End: 2019-03-25

## 2019-03-25 ENCOUNTER — APPOINTMENT (OUTPATIENT)
Dept: LAB | Facility: MEDICAL CENTER | Age: 34
End: 2019-03-25
Payer: COMMERCIAL

## 2019-03-25 DIAGNOSIS — O03.9 SPONTANEOUS PREGNANCY LOSS: ICD-10-CM

## 2019-03-25 DIAGNOSIS — O03.9 SPONTANEOUS PREGNANCY LOSS: Primary | ICD-10-CM

## 2019-03-25 LAB
ABO GROUP BLD: NORMAL
BLD GP AB SCN SERPL QL: NEGATIVE
RH BLD: NEGATIVE
SPECIMEN EXPIRATION DATE: NORMAL

## 2019-03-25 PROCEDURE — 36415 COLL VENOUS BLD VENIPUNCTURE: CPT

## 2019-03-25 PROCEDURE — 86901 BLOOD TYPING SEROLOGIC RH(D): CPT

## 2019-03-25 PROCEDURE — 86850 RBC ANTIBODY SCREEN: CPT

## 2019-03-25 PROCEDURE — 86900 BLOOD TYPING SEROLOGIC ABO: CPT

## 2019-03-25 NOTE — TELEPHONE ENCOUNTER
Spoke w pt  Began bleeding Fri night  Off and on all weekend and then this am w some more significant cramping and bleeding that has since subsided  Now just w moderate bleeding  No fevers/chills or other sx  Pt will plan T&S today and Rhogam to be ordered to get here in the office and plan injection tomorrow  I did r/w Dr Curly Ojeda and it is ok that she will be past the ideal 72 hour window  Elisabeth to call pt tomorrow when Rhogam arrives to have her come in for injection

## 2019-03-26 ENCOUNTER — OFFICE VISIT (OUTPATIENT)
Dept: OBGYN CLINIC | Facility: CLINIC | Age: 34
End: 2019-03-26
Payer: COMMERCIAL

## 2019-03-26 VITALS
DIASTOLIC BLOOD PRESSURE: 68 MMHG | BODY MASS INDEX: 23.14 KG/M2 | SYSTOLIC BLOOD PRESSURE: 126 MMHG | HEIGHT: 66 IN | WEIGHT: 144 LBS

## 2019-03-26 DIAGNOSIS — O03.4 INCOMPLETE ABORTION: Primary | ICD-10-CM

## 2019-03-26 PROCEDURE — 99213 OFFICE O/P EST LOW 20 MIN: CPT | Performed by: OBSTETRICS & GYNECOLOGY

## 2019-03-26 RX ORDER — MISOPROSTOL 200 UG/1
800 TABLET ORAL ONCE
Qty: 4 TABLET | Refills: 0 | Status: SHIPPED | OUTPATIENT
Start: 2019-03-26 | End: 2019-04-10 | Stop reason: SDUPTHER

## 2019-03-26 NOTE — PROGRESS NOTES
Chuyita Sotomayor is a 26 y o   female with  Center confirmed IUFD  Patient began and miscarriage naturally over the past weekend with heavier bleeding and passage of clot and blood as well as material   Currently patient is having lighter bleeding with occasional heavier bleeding when she is standing or more active still passing some clots  Counseled reviewed with patient discussed using a dose of Cytotec as needed and patient will receive her RhoGAM today  Personal health questionnaire reviewed: yes  Gynecologic History  Patient's last menstrual period was 2019  Contraception: condoms  Last Pap:  2018  Results were: normal    Obstetric History  OB History    Para Term  AB Living   11 6 5   4 5   SAB TAB Ectopic Multiple Live Births   4       5      # Outcome Date GA Lbr Maldonado/2nd Weight Sex Delivery Anes PTL Lv   11 Current            10 SAB 10/16/18 4w0d          9 SAB 18 4w0d          8 SAB 18 4w0d          7 Term 14     Vag-Spont      6 Term 12     Vag-Spont      5 SAB 11 4w0d          4 Para 07/01/10     Vag-Spont      3 Term 11/10/08     Vag-Spont      2 Term 07     Vag-Spont      1 Term               Obstetric Comments   chemical pregnancy x 4    x 5         The following portions of the patient's history were reviewed and updated as appropriate: allergies, current medications, past family history, past medical history, past social history, past surgical history and problem list     Review of Systems  Review of Systems   Constitutional: Negative for chills, fatigue, fever and unexpected weight change  HENT: Negative for dental problem, sinus pressure and sinus pain  Eyes: Negative for visual disturbance  Respiratory: Negative for cough, shortness of breath and wheezing  Cardiovascular: Negative for chest pain and leg swelling     Gastrointestinal: Negative for constipation, diarrhea, nausea and vomiting  Genitourinary: Negative for menstrual problem, pelvic pain and urgency  Musculoskeletal: Negative for back pain  Allergic/Immunologic: Negative for environmental allergies  Neurological: Negative for dizziness and headaches  Objective     /68 (BP Location: Left arm, Cuff Size: Standard)   Ht 5' 6" (1 676 m)   Wt 65 3 kg (144 lb)   LMP 2019   BMI 23 24 kg/m²   Pelvic: external genitalia normal, no cervical motion tenderness and Cervix is open some clot is cleared from cervix an Allis is passed to just within the os with no definitive material obtained  No significant bleeding at this time overall light      Assessment  26year-old  with incomplete  and A negative blood type     Plan  Patient prescribed Cytotec to use as needed if bleeding does not steadily decrease patient will receive RhoGAM today and aware that we will be in contact regarding following quants down depending on how miscarriage progresses

## 2019-04-03 ENCOUNTER — TELEPHONE (OUTPATIENT)
Dept: OBGYN CLINIC | Facility: CLINIC | Age: 34
End: 2019-04-03

## 2019-04-03 DIAGNOSIS — O03.9 MISCARRIAGE: Primary | ICD-10-CM

## 2019-04-05 ENCOUNTER — TELEPHONE (OUTPATIENT)
Dept: OBGYN CLINIC | Facility: CLINIC | Age: 34
End: 2019-04-05

## 2019-04-05 ENCOUNTER — APPOINTMENT (OUTPATIENT)
Dept: LAB | Facility: CLINIC | Age: 34
End: 2019-04-05
Payer: COMMERCIAL

## 2019-04-05 DIAGNOSIS — O03.9 MISCARRIAGE: ICD-10-CM

## 2019-04-05 DIAGNOSIS — O03.9 MISCARRIAGE: Primary | ICD-10-CM

## 2019-04-05 LAB — B-HCG SERPL-ACNC: 1590 MIU/ML

## 2019-04-05 PROCEDURE — 36415 COLL VENOUS BLD VENIPUNCTURE: CPT

## 2019-04-05 PROCEDURE — 84702 CHORIONIC GONADOTROPIN TEST: CPT

## 2019-04-09 ENCOUNTER — APPOINTMENT (OUTPATIENT)
Dept: LAB | Facility: HOSPITAL | Age: 34
End: 2019-04-09
Attending: OBSTETRICS & GYNECOLOGY
Payer: COMMERCIAL

## 2019-04-09 ENCOUNTER — TELEPHONE (OUTPATIENT)
Dept: OBGYN CLINIC | Facility: CLINIC | Age: 34
End: 2019-04-09

## 2019-04-09 ENCOUNTER — HOSPITAL ENCOUNTER (OUTPATIENT)
Dept: ULTRASOUND IMAGING | Facility: HOSPITAL | Age: 34
Discharge: HOME | End: 2019-04-09
Attending: OBSTETRICS & GYNECOLOGY
Payer: COMMERCIAL

## 2019-04-09 DIAGNOSIS — O03.9 MISCARRIAGE: ICD-10-CM

## 2019-04-09 DIAGNOSIS — O03.9 SPONTANEOUS MISCARRIAGE: Primary | ICD-10-CM

## 2019-04-09 DIAGNOSIS — O03.9 SPONTANEOUS MISCARRIAGE: ICD-10-CM

## 2019-04-09 LAB — B-HCG SERPL-ACNC: 887.1 MIU/ML

## 2019-04-09 PROCEDURE — 76856 US EXAM PELVIC COMPLETE: CPT

## 2019-04-09 PROCEDURE — 84702 CHORIONIC GONADOTROPIN TEST: CPT

## 2019-04-09 PROCEDURE — 76830 TRANSVAGINAL US NON-OB: CPT

## 2019-04-09 PROCEDURE — 36415 COLL VENOUS BLD VENIPUNCTURE: CPT

## 2019-04-10 ENCOUNTER — OFFICE VISIT (OUTPATIENT)
Dept: OBGYN CLINIC | Facility: CLINIC | Age: 34
End: 2019-04-10
Payer: COMMERCIAL

## 2019-04-10 VITALS
WEIGHT: 142 LBS | SYSTOLIC BLOOD PRESSURE: 130 MMHG | DIASTOLIC BLOOD PRESSURE: 78 MMHG | BODY MASS INDEX: 22.29 KG/M2 | HEIGHT: 67 IN

## 2019-04-10 DIAGNOSIS — O02.1 MISSED ABORTION: Primary | ICD-10-CM

## 2019-04-10 DIAGNOSIS — R93.89 ABNORMAL ULTRASOUND: ICD-10-CM

## 2019-04-10 DIAGNOSIS — O03.4 INCOMPLETE ABORTION: ICD-10-CM

## 2019-04-10 PROCEDURE — 99213 OFFICE O/P EST LOW 20 MIN: CPT | Performed by: PHYSICIAN ASSISTANT

## 2019-04-10 RX ORDER — MISOPROSTOL 200 UG/1
800 TABLET ORAL ONCE
Qty: 4 TABLET | Refills: 0 | Status: SHIPPED | OUTPATIENT
Start: 2019-04-10 | End: 2019-10-07

## 2019-04-11 ENCOUNTER — APPOINTMENT (OUTPATIENT)
Dept: LAB | Facility: MEDICAL CENTER | Age: 34
End: 2019-04-11
Payer: COMMERCIAL

## 2019-04-11 ENCOUNTER — TELEPHONE (OUTPATIENT)
Dept: OBGYN CLINIC | Facility: CLINIC | Age: 34
End: 2019-04-11

## 2019-04-11 DIAGNOSIS — O02.1 MISSED ABORTION: ICD-10-CM

## 2019-04-11 LAB
B-HCG SERPL-ACNC: 564 MIU/ML
BASOPHILS # BLD AUTO: 0.04 THOUSANDS/ΜL (ref 0–0.1)
BASOPHILS NFR BLD AUTO: 1 % (ref 0–1)
EOSINOPHIL # BLD AUTO: 0.2 THOUSAND/ΜL (ref 0–0.61)
EOSINOPHIL NFR BLD AUTO: 4 % (ref 0–6)
ERYTHROCYTE [DISTWIDTH] IN BLOOD BY AUTOMATED COUNT: 12.8 % (ref 11.6–15.1)
HCT VFR BLD AUTO: 30.7 % (ref 34.8–46.1)
HGB BLD-MCNC: 9.8 G/DL (ref 11.5–15.4)
IMM GRANULOCYTES # BLD AUTO: 0.02 THOUSAND/UL (ref 0–0.2)
IMM GRANULOCYTES NFR BLD AUTO: 0 % (ref 0–2)
LYMPHOCYTES # BLD AUTO: 1.69 THOUSANDS/ΜL (ref 0.6–4.47)
LYMPHOCYTES NFR BLD AUTO: 30 % (ref 14–44)
MCH RBC QN AUTO: 29.8 PG (ref 26.8–34.3)
MCHC RBC AUTO-ENTMCNC: 31.9 G/DL (ref 31.4–37.4)
MCV RBC AUTO: 93 FL (ref 82–98)
MONOCYTES # BLD AUTO: 0.38 THOUSAND/ΜL (ref 0.17–1.22)
MONOCYTES NFR BLD AUTO: 7 % (ref 4–12)
NEUTROPHILS # BLD AUTO: 3.35 THOUSANDS/ΜL (ref 1.85–7.62)
NEUTS SEG NFR BLD AUTO: 58 % (ref 43–75)
NRBC BLD AUTO-RTO: 0 /100 WBCS
PLATELET # BLD AUTO: 194 THOUSANDS/UL (ref 149–390)
PMV BLD AUTO: 11.9 FL (ref 8.9–12.7)
RBC # BLD AUTO: 3.29 MILLION/UL (ref 3.81–5.12)
WBC # BLD AUTO: 5.68 THOUSAND/UL (ref 4.31–10.16)

## 2019-04-11 PROCEDURE — 85025 COMPLETE CBC W/AUTO DIFF WBC: CPT

## 2019-04-11 PROCEDURE — 36415 COLL VENOUS BLD VENIPUNCTURE: CPT

## 2019-04-11 PROCEDURE — 84702 CHORIONIC GONADOTROPIN TEST: CPT

## 2019-04-12 DIAGNOSIS — O03.4 INCOMPLETE ABORTION: Primary | ICD-10-CM

## 2019-04-12 RX ORDER — AMOXICILLIN AND CLAVULANATE POTASSIUM 250; 125 MG/1; MG/1
1 TABLET, FILM COATED ORAL EVERY 8 HOURS SCHEDULED
Qty: 21 TABLET | Refills: 0 | Status: SHIPPED | OUTPATIENT
Start: 2019-04-12 | End: 2019-04-19

## 2019-04-18 ENCOUNTER — TRANSCRIBE ORDERS (OUTPATIENT)
Dept: LAB | Facility: CLINIC | Age: 34
End: 2019-04-18

## 2019-04-30 ENCOUNTER — TELEPHONE (OUTPATIENT)
Dept: OBGYN CLINIC | Facility: CLINIC | Age: 34
End: 2019-04-30

## 2019-04-30 DIAGNOSIS — O03.9 MISCARRIAGE: Primary | ICD-10-CM

## 2019-05-01 ENCOUNTER — APPOINTMENT (OUTPATIENT)
Dept: LAB | Facility: MEDICAL CENTER | Age: 34
End: 2019-05-01
Payer: COMMERCIAL

## 2019-05-01 DIAGNOSIS — O03.9 MISCARRIAGE: ICD-10-CM

## 2019-05-01 LAB — B-HCG SERPL-ACNC: 2 MIU/ML

## 2019-05-01 PROCEDURE — 84702 CHORIONIC GONADOTROPIN TEST: CPT

## 2019-05-01 PROCEDURE — 36415 COLL VENOUS BLD VENIPUNCTURE: CPT

## 2019-05-13 ENCOUNTER — TELEPHONE (OUTPATIENT)
Dept: OBGYN CLINIC | Facility: CLINIC | Age: 34
End: 2019-05-13

## 2019-05-23 ENCOUNTER — HOSPITAL ENCOUNTER (OUTPATIENT)
Dept: ULTRASOUND IMAGING | Facility: MEDICAL CENTER | Age: 34
Discharge: HOME | End: 2019-05-23
Payer: COMMERCIAL

## 2019-05-23 DIAGNOSIS — O02.1 MISSED ABORTION: ICD-10-CM

## 2019-05-23 DIAGNOSIS — R93.89 ABNORMAL ULTRASOUND: ICD-10-CM

## 2019-05-23 PROCEDURE — 76830 TRANSVAGINAL US NON-OB: CPT

## 2019-05-23 PROCEDURE — 76856 US EXAM PELVIC COMPLETE: CPT

## 2019-10-07 ENCOUNTER — OFFICE VISIT (OUTPATIENT)
Dept: OBGYN CLINIC | Facility: CLINIC | Age: 34
End: 2019-10-07
Payer: COMMERCIAL

## 2019-10-07 VITALS
HEIGHT: 67 IN | DIASTOLIC BLOOD PRESSURE: 74 MMHG | SYSTOLIC BLOOD PRESSURE: 122 MMHG | BODY MASS INDEX: 22.29 KG/M2 | WEIGHT: 142 LBS

## 2019-10-07 DIAGNOSIS — N96 RECURRENT PREGNANCY LOSS: Primary | ICD-10-CM

## 2019-10-07 DIAGNOSIS — Z31.69 PRE-CONCEPTION COUNSELING: ICD-10-CM

## 2019-10-07 PROCEDURE — 99214 OFFICE O/P EST MOD 30 MIN: CPT | Performed by: OBSTETRICS & GYNECOLOGY

## 2019-10-07 RX ORDER — DESOGESTREL AND ETHINYL ESTRADIOL 0.15-0.03
1 KIT ORAL DAILY
COMMUNITY
End: 2020-04-22

## 2019-10-07 RX ORDER — DESOGESTREL AND ETHINYL ESTRADIOL 0.15-0.03
KIT ORAL
COMMUNITY
Start: 2019-10-03 | End: 2019-10-07 | Stop reason: ALTCHOICE

## 2019-10-07 RX ORDER — GLUCOSAMINE HCL 500 MG
TABLET ORAL
COMMUNITY
End: 2020-04-22

## 2019-10-07 NOTE — PROGRESS NOTES
Assessment/Plan    Diagnoses and all orders for this visit:    Recurrent pregnancy loss    Pre-conception counseling    - continue folate, MVI  - continue consultation with Dr Nydia Estrada    - f/u as needed  - consider progesterone supplementation in early pregnancy  Jaspal Saleem is a 37 y o  female here for a problem visit  Patient is complaining of recurrent miscarriages  She has had 5 normal pregnancies in the past  Over the past year, she has experienced 4 losses  The first three were early and straightforward  Her last miscarriage was in March and she began to miscarry at 10 weeks  The miscarriage took about 8 weeks  She felt like a number in the practice she was at, and was not happy with the way it was handled  She did have a recurrent pregnancy loss panel that was normal, and she requested MTHFR testing, and she has 2 copies of the mutation  She was counseled on folate supplementation and was started on aspirin, which she was on leading into the 4th loss  She did have a large subchorionic hematoma baby did not grow past 6 weeks but the heart was beating at 8 weeks  She then began miscarrying at 10 weeks  She did see Dr Brenda Kruse, [facility], in June and she offered some test but commented that since she is 37 is likely egg quality and chromosomal issues  She had an AMH level drawn which is 1 94  Rewey Aiden did have a consult with Dr Nydia Estrada last week in regards to the above-mentioned history  Dr Nydia Estrada placed on birth control pills which she started 3 days ago  She is undergoing hysteroscopy later this month  We discussed folate supplementation, baby aspirin, progesterone during the 1st trimester  We discussed that Dr Nydia Estrada and I would work together to help her achieve her goal of pregnancy  Her youngest child is 11years old  Since the birth of that child, they did move to a new home that is an older home built in 5    She is concerned about lead however I did review that she is low risk for lead ingestion  She also states that since the prior loss that started in March, her periods have been somewhat different in that she bleeds for 2 to 3 days and then stops for a day or 2 and then has spotting for 2 or 3 days  Did discuss that it can take a while for her body to get back to normal after miscarriages, especially since she had 4 miscarriages in 1 year  Patient Active Problem List   Diagnosis    Chronic tension headaches    Depression    Pre-conception counseling    Recurrent pregnancy loss    Well woman exam    Missed          Gynecologic History  Patient's last menstrual period was 10/02/2019 (exact date)  Contraception: OCP (estrogen/progesterone)  Last Pap: 2018  Results were: normal; HPV negative    Menstrual History:  OB History        10    Para   5    Term   5            AB   5    Living   5       SAB   5    TAB        Ectopic        Multiple        Live Births   5           Obstetric Comments   Menarche 13               Patient's last menstrual period was 10/02/2019 (exact date)    Period Cycle (Days): 28  Period Duration (Days): 3 to 7 days   Period Pattern: Regular  Menstrual Flow: Moderate, Light  Menstrual Control: Tampon, Thin pad, Maxi pad  Dysmenorrhea: None      Obstetric History  OB History    Para Term  AB Living   10 5 5   5 5   SAB TAB Ectopic Multiple Live Births   5       5      # Outcome Date GA Lbr Maldonado/2nd Weight Sex Delivery Anes PTL Lv   10 SAB 2019 10w0d    SAB      9 SAB 10/16/18 4w0d    SAB      8 SAB 18 4w0d    SAB      7 SAB 18 4w0d    SAB      6 Term 14 41w2d   F Vag-Spont   DAVID   5 Term 12 40w4d   F Vag-Spont   DAVID   4 SAB 11 7w0d          3 Term 07/01/10 40w1d   F Vag-Spont   DAVID   2 Term 11/10/08 39w6d   M Vag-Spont   DAVID   1 Term 07 39w2d   M Vag-Spont   DAVID      Obstetric Comments   Menarche 12          Past Medical History:   Diagnosis Date    Migraine  Miscarriage     x 4    Recurrent pregnancy loss     Varicella 1989       Past Surgical History:   Procedure Laterality Date    CONDYLOMA EXCISION/FULGURATION  May 2007    CYST REMOVAL      WISDOM TOOTH EXTRACTION           Family History   Problem Relation Age of Onset    Colon cancer Father     Lung cancer Maternal Grandfather     Migraines Mother    [de-identified] / [place] Mother         1 miscarriage    Miscarriages / Stillbirths Paternal Grandmother         2 miscarriages    No Known Problems Brother     No Known Problems Daughter     No Known Problems Son     No Known Problems Maternal Grandmother     No Known Problems Paternal Grandfather     No Known Problems Brother     No Known Problems Son     No Known Problems Daughter     No Known Problems Daughter        Social History     Socioeconomic History    Marital status: /Civil Union     Spouse name: Not on file    Number of children: Not on file    Years of education: Not on file    Highest education level: Not on file   Occupational History    Not on file   Social Needs    Financial resource strain: Not on file    Food insecurity:     Worry: Not on file     Inability: Not on file    Transportation needs:     Medical: Not on file     Non-medical: Not on file   Tobacco Use    Smoking status: Never Smoker    Smokeless tobacco: Never Used   Substance and Sexual Activity    Alcohol use: No    Drug use: No    Sexual activity: Yes     Partners: Male     Birth control/protection: None   Lifestyle    Physical activity:     Days per week: Not on file     Minutes per session: Not on file    Stress: Not on file   Relationships    Social connections:     Talks on phone: Not on file     Gets together: Not on file     Attends Taoist service: Not on file     Active member of club or organization: Not on file     Attends meetings of clubs or organizations: Not on file     Relationship status: Not on file    Intimate partner violence:     Fear of current or ex partner: Not on file     Emotionally abused: Not on file     Physically abused: Not on file     Forced sexual activity: Not on file   Other Topics Concern    Not on file   Social History Narrative    Not on file       Allergies  Cefaclor    Medications    Current Outpatient Medications:     Cholecalciferol (VITAMIN D3) 3000 units TABS, Take by mouth, Disp: , Rfl:     Coenzyme Q10 (COQ10 PO), Take by mouth, Disp: , Rfl:     desogestrel-ethinyl estradiol (APRI) 0 15-30 MG-MCG per tablet, Take 1 tablet by mouth daily, Disp: , Rfl:     Prenatal Vit-Fe Fumarate-FA (PRENATAL 1 PLUS 1 PO), Take by mouth, Disp: , Rfl:     Probiotic Product (PROBIOTIC-10) CAPS, Take by mouth, Disp: , Rfl:     Vitamin Mixture (VITAMIN E COMPLETE PO), Take by mouth, Disp: , Rfl:       Review of Systems  Review of Systems   Constitutional: Negative for activity change, appetite change, chills, fatigue, fever and unexpected weight change  HENT: Negative for hearing loss, mouth sores and nosebleeds  Eyes: Negative for visual disturbance  Respiratory: Negative for chest tightness, shortness of breath and wheezing  Cardiovascular: Negative for chest pain, palpitations and leg swelling  Gastrointestinal: Positive for nausea  Negative for abdominal distention, abdominal pain, blood in stool, constipation, diarrhea and vomiting  Endocrine: Negative for cold intolerance and heat intolerance  Genitourinary: Negative for difficulty urinating, dyspareunia, dysuria, flank pain, genital sores, hematuria, menstrual problem, pelvic pain, urgency, vaginal bleeding, vaginal discharge and vaginal pain  Musculoskeletal: Negative for back pain, myalgias and neck pain  Allergic/Immunologic: Negative for immunocompromised state  Neurological: Negative for dizziness, seizures, syncope, weakness, light-headedness and headaches  Hematological: Negative for adenopathy   Does not bruise/bleed easily  Psychiatric/Behavioral: Negative for agitation, behavioral problems, confusion, self-injury, sleep disturbance and suicidal ideas  The patient is not nervous/anxious  Objective     /74   Ht 5' 6 5" (1 689 m)   Wt 64 4 kg (142 lb)   LMP 10/02/2019 (Exact Date)   Breastfeeding? No   BMI 22 58 kg/m²       Physical Exam   Constitutional: She is oriented to person, place, and time  She appears well-developed and well-nourished  Pulmonary/Chest: Effort normal  No respiratory distress  Neurological: She is alert and oriented to person, place, and time  Psychiatric: She has a normal mood and affect  Her behavior is normal    Vitals reviewed

## 2019-10-10 ENCOUNTER — ANESTHESIA EVENT (OUTPATIENT)
Dept: PERIOP | Facility: AMBULARY SURGERY CENTER | Age: 34
End: 2019-10-10
Payer: COMMERCIAL

## 2019-10-11 ENCOUNTER — OFFICE VISIT (OUTPATIENT)
Dept: FAMILY MEDICINE CLINIC | Facility: CLINIC | Age: 34
End: 2019-10-11
Payer: COMMERCIAL

## 2019-10-11 VITALS
HEIGHT: 66 IN | BODY MASS INDEX: 22.82 KG/M2 | TEMPERATURE: 98.1 F | HEART RATE: 82 BPM | SYSTOLIC BLOOD PRESSURE: 100 MMHG | WEIGHT: 142 LBS | OXYGEN SATURATION: 99 % | DIASTOLIC BLOOD PRESSURE: 60 MMHG | RESPIRATION RATE: 16 BRPM

## 2019-10-11 DIAGNOSIS — R42 EPISODIC LIGHTHEADEDNESS: Primary | ICD-10-CM

## 2019-10-11 LAB
EXTERNAL CHLAMYDIA RESULT: NEGATIVE
EXTERNAL HIV SCREEN: NORMAL
HBA1C MFR BLD HPLC: 5 %
HCV AB SER-ACNC: NEGATIVE
N GONORRHOEA RRNA SPEC QL PROBE: NEGATIVE

## 2019-10-11 PROCEDURE — 3008F BODY MASS INDEX DOCD: CPT | Performed by: FAMILY MEDICINE

## 2019-10-11 PROCEDURE — 99213 OFFICE O/P EST LOW 20 MIN: CPT | Performed by: FAMILY MEDICINE

## 2019-10-11 NOTE — PROGRESS NOTES
[facility]      NAME: Obi Traylor  AGE: 37 y o  SEX: female  : 1985   MRN: [id number]    DATE: 10/11/2019  TIME: 5:50 PM    Assessment and Plan     Problem List Items Addressed This Visit     None      Visit Diagnoses     Episodic lightheadedness    -  Primary       Physical exam normal   No focal neurologic findings  Blood work pending  Will check MRI with attention to I a c   Can take Dramamine as needed for symptom relief in the meantime  Return to office in: prn      Chief Complaint     Chief Complaint   Patient presents with    Dizziness     x 6 months        History of Present Illness     Patient presents with a chief complaint of dizziness lightheadedness with mild nausea  Symptoms have been going on for at least several weeks, possibly longer  The symptoms she describes are mild lightheadedness without any actual spinning sensation  Some mild nausea but no vomiting  There made worse with head movement or stopping go activity such as driving in the car  She recently took Dramamine while on a trip to avoid motion sickness and found that that pretty much eliminated all of her symptoms  She has a history of chronic daily headaches but was recently placed on birth control pills as part of her OBGYN/fertility treatment and found that in the week that she has been on birth control pills her headaches have been eliminated entirely though her dizziness lightheadedness has not  The following portions of the patient's history were reviewed and updated as appropriate: allergies, current medications, past family history, past medical history, past social history, past surgical history and problem list     Review of Systems   Review of Systems   Constitutional: Negative  Respiratory: Negative  Cardiovascular: Negative  Gastrointestinal: Positive for nausea  Genitourinary: Negative  Musculoskeletal: Negative      Neurological: Positive for light-headedness  Psychiatric/Behavioral: Negative  Active Problem List     Patient Active Problem List   Diagnosis    Chronic tension headaches    Depression    Pre-conception counseling    Recurrent pregnancy loss    Well woman exam    Missed        Objective   /60 (BP Location: Left arm, Patient Position: Sitting, Cuff Size: Adult)   Pulse 82   Temp 98 1 °F (36 7 °C) (Tympanic)   Resp 16   Ht 5' 6 14" (1 68 m)   Wt 64 4 kg (142 lb)   LMP 10/02/2019 (Exact Date)   SpO2 99%   BMI 22 82 kg/m²     Physical Exam   Constitutional: She is oriented to person, place, and time  She appears well-developed and well-nourished  No distress  HENT:   Head: Normocephalic and atraumatic  Eyes: Pupils are equal, round, and reactive to light  Conjunctivae are normal  Right eye exhibits no discharge  Neck: Normal range of motion  No thyromegaly present  Cardiovascular: Normal rate and regular rhythm  Pulmonary/Chest: Effort normal and breath sounds normal  No respiratory distress  Lymphadenopathy:     She has no cervical adenopathy  Neurological: She is alert and oriented to person, place, and time  She displays normal reflexes  No cranial nerve deficit or sensory deficit  Coordination normal    Completely nonfocal neurologic exam    Skin: Skin is warm and dry  She is not diaphoretic  Psychiatric: She has a normal mood and affect  Her behavior is normal  Judgment and thought content normal    Nursing note and vitals reviewed          Current Medications     Current Outpatient Medications:     Cholecalciferol (VITAMIN D3) 3000 units TABS, Take by mouth, Disp: , Rfl:     Coenzyme Q10 (COQ10 PO), Take by mouth, Disp: , Rfl:     desogestrel-ethinyl estradiol (APRI) 0 15-30 MG-MCG per tablet, Take 1 tablet by mouth daily, Disp: , Rfl:     Prenatal Vit-Fe Fumarate-FA (PRENATAL 1 PLUS 1 PO), Take by mouth, Disp: , Rfl:     Probiotic Product (PROBIOTIC-10) CAPS, Take by mouth, Disp: , Rfl:     Vitamin Mixture (VITAMIN E COMPLETE PO), Take by mouth, Disp: , Rfl:     Health Maintenance     Health Maintenance   Topic Date Due    INFLUENZA VACCINE  07/01/2019    BMI: Adult  10/07/2020    Cervical Cancer Screening  11/05/2021    DTaP,Tdap,and Td Vaccines (2 - Td) 06/24/2024    Pneumococcal Vaccine: 65+ Years (1 of 2 - PCV13) 04/29/2048    Pneumococcal Vaccine: Pediatrics (0 to 5 Years) and At-Risk Patients (6 to 59 Years)  Aged Out    HEPATITIS B VACCINES  Aged [employer] History   Administered Date(s) Administered    INFLUENZA 02/18/2019    Rho (D) Immune Globulin 08/10/2018, 09/14/2018, 03/26/2019    Tdap 06/24/2014       Leslie Heard DO  [facility]

## 2019-10-15 NOTE — PRE-PROCEDURE INSTRUCTIONS
Pre-Surgery Instructions:   Medication Instructions    Cholecalciferol (VITAMIN D3) 3000 units TABS Instructed patient per Anesthesia Guidelines   Cobalamine Combinations (B12 FOLATE PO) Instructed patient per Anesthesia Guidelines   Coenzyme Q10 (COQ10 PO) Instructed patient per Anesthesia Guidelines   desogestrel-ethinyl estradiol (APRI) 0 15-30 MG-MCG per tablet Instructed patient per Anesthesia Guidelines   Prenatal Vit-Fe Fumarate-FA (PRENATAL 1 PLUS 1 PO) Instructed patient per Anesthesia Guidelines   Probiotic Product (PROBIOTIC-10) CAPS Instructed patient per Anesthesia Guidelines   Vitamin Mixture (VITAMIN E COMPLETE PO) Instructed patient per Anesthesia Guidelines  Pre-op and bathing instructions given  Patient advised to purchase hibiclens

## 2019-10-16 ENCOUNTER — TELEPHONE (OUTPATIENT)
Dept: FAMILY MEDICINE CLINIC | Facility: CLINIC | Age: 34
End: 2019-10-16

## 2019-10-17 LAB
EXTERNAL CHLAMYDIA RESULT: NOT DETECTED
EXTERNAL HIV SCREEN: NORMAL
HCV AB SER-ACNC: NON REACTIVE
N GONORRHOEA RRNA SPEC QL PROBE: NOT DETECTED

## 2019-10-21 ENCOUNTER — HOSPITAL ENCOUNTER (OUTPATIENT)
Facility: AMBULARY SURGERY CENTER | Age: 34
Setting detail: OUTPATIENT SURGERY
Discharge: HOME | End: 2019-10-21
Attending: OBSTETRICS & GYNECOLOGY | Admitting: OBSTETRICS & GYNECOLOGY
Payer: COMMERCIAL

## 2019-10-21 ENCOUNTER — ANESTHESIA (OUTPATIENT)
Dept: PERIOP | Facility: AMBULARY SURGERY CENTER | Age: 34
End: 2019-10-21
Payer: COMMERCIAL

## 2019-10-21 VITALS
RESPIRATION RATE: 18 BRPM | WEIGHT: 142 LBS | HEART RATE: 77 BPM | HEIGHT: 66 IN | BODY MASS INDEX: 22.82 KG/M2 | DIASTOLIC BLOOD PRESSURE: 67 MMHG | OXYGEN SATURATION: 99 % | SYSTOLIC BLOOD PRESSURE: 107 MMHG | TEMPERATURE: 98.2 F

## 2019-10-21 DIAGNOSIS — N92.0 EXCESSIVE AND FREQUENT MENSTRUATION WITH REGULAR CYCLE: ICD-10-CM

## 2019-10-21 DIAGNOSIS — O02.1 MISSED ABORTION: ICD-10-CM

## 2019-10-21 PROBLEM — Z98.890 S/P MYOMECTOMY: Status: ACTIVE | Noted: 2019-10-21

## 2019-10-21 LAB
EXT PREGNANCY TEST URINE: NEGATIVE
EXT. CONTROL: NORMAL

## 2019-10-21 PROCEDURE — 81025 URINE PREGNANCY TEST: CPT | Performed by: OBSTETRICS & GYNECOLOGY

## 2019-10-21 PROCEDURE — 88305 TISSUE EXAM BY PATHOLOGIST: CPT | Performed by: PATHOLOGY

## 2019-10-21 RX ORDER — LIDOCAINE HYDROCHLORIDE 10 MG/ML
0.5 INJECTION, SOLUTION EPIDURAL; INFILTRATION; INTRACAUDAL; PERINEURAL ONCE AS NEEDED
Status: DISCONTINUED | OUTPATIENT
Start: 2019-10-21 | End: 2019-10-21 | Stop reason: HOSPADM

## 2019-10-21 RX ORDER — ALBUTEROL SULFATE 2.5 MG/3ML
2.5 SOLUTION RESPIRATORY (INHALATION) ONCE AS NEEDED
Status: DISCONTINUED | OUTPATIENT
Start: 2019-10-21 | End: 2019-10-21 | Stop reason: HOSPADM

## 2019-10-21 RX ORDER — FENTANYL CITRATE/PF 50 MCG/ML
25 SYRINGE (ML) INJECTION
Status: DISCONTINUED | OUTPATIENT
Start: 2019-10-21 | End: 2019-10-21 | Stop reason: HOSPADM

## 2019-10-21 RX ORDER — LABETALOL 20 MG/4 ML (5 MG/ML) INTRAVENOUS SYRINGE
5
Status: DISCONTINUED | OUTPATIENT
Start: 2019-10-21 | End: 2019-10-21 | Stop reason: HOSPADM

## 2019-10-21 RX ORDER — LIDOCAINE HYDROCHLORIDE 10 MG/ML
INJECTION, SOLUTION INFILTRATION; PERINEURAL AS NEEDED
Status: DISCONTINUED | OUTPATIENT
Start: 2019-10-21 | End: 2019-10-21 | Stop reason: SURG

## 2019-10-21 RX ORDER — PROMETHAZINE HYDROCHLORIDE 25 MG/ML
12.5 INJECTION, SOLUTION INTRAMUSCULAR; INTRAVENOUS ONCE AS NEEDED
Status: DISCONTINUED | OUTPATIENT
Start: 2019-10-21 | End: 2019-10-21 | Stop reason: HOSPADM

## 2019-10-21 RX ORDER — DEXAMETHASONE SODIUM PHOSPHATE 10 MG/ML
INJECTION, SOLUTION INTRAMUSCULAR; INTRAVENOUS AS NEEDED
Status: DISCONTINUED | OUTPATIENT
Start: 2019-10-21 | End: 2019-10-21 | Stop reason: SURG

## 2019-10-21 RX ORDER — SODIUM CHLORIDE 9 MG/ML
INJECTION, SOLUTION INTRAVENOUS CONTINUOUS PRN
Status: DISCONTINUED | OUTPATIENT
Start: 2019-10-21 | End: 2019-10-21 | Stop reason: SURG

## 2019-10-21 RX ORDER — SODIUM CHLORIDE 9 MG/ML
50 INJECTION, SOLUTION INTRAVENOUS CONTINUOUS
Status: DISCONTINUED | OUTPATIENT
Start: 2019-10-21 | End: 2019-10-21 | Stop reason: HOSPADM

## 2019-10-21 RX ORDER — ONDANSETRON 2 MG/ML
4 INJECTION INTRAMUSCULAR; INTRAVENOUS EVERY 6 HOURS PRN
Status: DISCONTINUED | OUTPATIENT
Start: 2019-10-21 | End: 2019-10-21 | Stop reason: HOSPADM

## 2019-10-21 RX ORDER — ONDANSETRON 2 MG/ML
4 INJECTION INTRAMUSCULAR; INTRAVENOUS ONCE AS NEEDED
Status: DISCONTINUED | OUTPATIENT
Start: 2019-10-21 | End: 2019-10-21 | Stop reason: HOSPADM

## 2019-10-21 RX ORDER — HYDROMORPHONE HCL/PF 1 MG/ML
0.5 SYRINGE (ML) INJECTION
Status: DISCONTINUED | OUTPATIENT
Start: 2019-10-21 | End: 2019-10-21 | Stop reason: HOSPADM

## 2019-10-21 RX ORDER — PROPOFOL 10 MG/ML
INJECTION, EMULSION INTRAVENOUS AS NEEDED
Status: DISCONTINUED | OUTPATIENT
Start: 2019-10-21 | End: 2019-10-21 | Stop reason: SURG

## 2019-10-21 RX ORDER — IBUPROFEN 600 MG/1
600 TABLET ORAL EVERY 6 HOURS PRN
Status: DISCONTINUED | OUTPATIENT
Start: 2019-10-21 | End: 2019-10-21 | Stop reason: HOSPADM

## 2019-10-21 RX ORDER — ACETAMINOPHEN 325 MG/1
650 TABLET ORAL EVERY 6 HOURS PRN
Status: DISCONTINUED | OUTPATIENT
Start: 2019-10-21 | End: 2019-10-21 | Stop reason: HOSPADM

## 2019-10-21 RX ORDER — FENTANYL CITRATE 50 UG/ML
INJECTION, SOLUTION INTRAMUSCULAR; INTRAVENOUS AS NEEDED
Status: DISCONTINUED | OUTPATIENT
Start: 2019-10-21 | End: 2019-10-21 | Stop reason: SURG

## 2019-10-21 RX ORDER — MAGNESIUM HYDROXIDE 1200 MG/15ML
LIQUID ORAL AS NEEDED
Status: DISCONTINUED | OUTPATIENT
Start: 2019-10-21 | End: 2019-10-21 | Stop reason: HOSPADM

## 2019-10-21 RX ORDER — MEPERIDINE HYDROCHLORIDE 25 MG/ML
12.5 INJECTION INTRAMUSCULAR; INTRAVENOUS; SUBCUTANEOUS AS NEEDED
Status: DISCONTINUED | OUTPATIENT
Start: 2019-10-21 | End: 2019-10-21 | Stop reason: HOSPADM

## 2019-10-21 RX ORDER — MIDAZOLAM HYDROCHLORIDE 1 MG/ML
INJECTION INTRAMUSCULAR; INTRAVENOUS AS NEEDED
Status: DISCONTINUED | OUTPATIENT
Start: 2019-10-21 | End: 2019-10-21 | Stop reason: SURG

## 2019-10-21 RX ORDER — KETOROLAC TROMETHAMINE 30 MG/ML
INJECTION, SOLUTION INTRAMUSCULAR; INTRAVENOUS AS NEEDED
Status: DISCONTINUED | OUTPATIENT
Start: 2019-10-21 | End: 2019-10-21 | Stop reason: SURG

## 2019-10-21 RX ORDER — ONDANSETRON 2 MG/ML
INJECTION INTRAMUSCULAR; INTRAVENOUS AS NEEDED
Status: DISCONTINUED | OUTPATIENT
Start: 2019-10-21 | End: 2019-10-21 | Stop reason: SURG

## 2019-10-21 RX ADMIN — DEXAMETHASONE SODIUM PHOSPHATE 4 MG: 10 INJECTION, SOLUTION INTRAMUSCULAR; INTRAVENOUS at 10:10

## 2019-10-21 RX ADMIN — LIDOCAINE HYDROCHLORIDE 50 MG: 10 INJECTION, SOLUTION INFILTRATION; PERINEURAL at 10:01

## 2019-10-21 RX ADMIN — KETOROLAC TROMETHAMINE 30 MG: 30 INJECTION, SOLUTION INTRAMUSCULAR at 10:10

## 2019-10-21 RX ADMIN — SODIUM CHLORIDE: 0.9 INJECTION, SOLUTION INTRAVENOUS at 08:47

## 2019-10-21 RX ADMIN — FENTANYL CITRATE 50 MCG: 50 INJECTION, SOLUTION INTRAMUSCULAR; INTRAVENOUS at 10:05

## 2019-10-21 RX ADMIN — PROPOFOL 200 MG: 10 INJECTION, EMULSION INTRAVENOUS at 10:01

## 2019-10-21 RX ADMIN — ONDANSETRON 4 MG: 2 INJECTION INTRAMUSCULAR; INTRAVENOUS at 10:10

## 2019-10-21 RX ADMIN — FENTANYL CITRATE 50 MCG: 50 INJECTION, SOLUTION INTRAMUSCULAR; INTRAVENOUS at 10:30

## 2019-10-21 RX ADMIN — MIDAZOLAM HYDROCHLORIDE 2 MG: 1 INJECTION, SOLUTION INTRAMUSCULAR; INTRAVENOUS at 09:58

## 2019-10-21 NOTE — ANESTHESIA POSTPROCEDURE EVALUATION
Post-Op Assessment Note    CV Status:  Stable    Pain management: adequate     Mental Status:  Alert and awake   Hydration Status:  Euvolemic   PONV Controlled:  Controlled   Airway Patency:  Patent   Post Op Vitals Reviewed: Yes      Staff: CRNA           BP (!) 87/52 (10/21/19 1036)    Temp 97 6 °F (36 4 °C) (10/21/19 1036)    Pulse (!) 54 (10/21/19 1036)   Resp 14 (10/21/19 1036)    SpO2 99 % (10/21/19 1036)

## 2019-10-21 NOTE — OP NOTE
OPERATIVE REPORT  PATIENT NAME: Gala Dougherty    :  1985  MRN: [id number]  Pt Location: [facility] OR ROOM 05    SURGERY DATE: 10/21/2019    Surgeon(s) and Role:     * Theora Dancer, DO - Primary     * Olga Newman MD - Assisting    Preop Diagnosis:  Excessive and frequent menstruation with regular cycle [N92 0]      Post-Op Diagnosis Codes:     * Excessive and frequent menstruation with regular cycle [N92 0]     * Micropolyps      Procedure(s) (LRB):  HYSTEROSCOPY; BIOPSY ([place]) (N/A), Polypectomy     Specimen(s):  ID Type Source Tests Collected by Time Destination   1 : endometrial bx and micro polyps Tissue Endometrium TISSUE EXAM Theora Dancer, DO 10/21/2019 1026        Estimated Blood Loss:   Minimal    Drains:  * No LDAs found *    Urine: 200 ml    IVF: 100 ml    Hysteroscopic fluid deficit: 205 ml     Anesthesia Type:   Choice    Operative Indications:  Excessive and frequent menstruation with regular cycle [N92 0]  S/P Pregnancy loss  Micro polyps seen on hysteroscopy     Operative Findings: The uterus sounded to 8 centimeters retroverted  Through the hysteroscope both ostia were well visualized  There were small micro polyps noted throughout the cavity  Otherwise the uterine cavity was noted to be normal     Complications:   None    Procedure and Technique:  The patient was identified in the preoperative holding area and taken to the operative suite where she was placed in supine position  She then underwent general LMA anesthesia and was placed in the dorsal lithotomy position and prepped and draped in the normal sterile fashion  A time-out was held according to protocol and was deemed we could begin the procedure  A straight catheter was inserted to empty the bladder  The cervix was visualized using a [place] retractor posteriorly and a [place] retractor anteriorly  The anterior lip the cervix was grasped with single-tooth tenaculum    The uterus sounded to 8 centimeters retroverted using an endometrial biopsy [place]  The cervix was progressively dilated to a 17 [place] using [place] dilators  The hysteroscope was inserted and a careful survey of the cavity revealed the findings as noted above  Both ostia were well visualized  The small micro polyps were removed using the MyoSure hysteroscope device and biopsies taken using the MyoSure hysteroscope device  The uterine cavity did appear inflamed given the findings of the small micro polyps and a small brown area that was suspicious for were history of retained products  The small brown area was biopsied and sent to pathology for further evaluation  All micro polyps were sent to pathology for further evaluation  This concluded the procedure  All instruments were removed the patient's vagina and hemostasis was observed  The patient was returned to supine position and awakened from anesthesia and taken to the recovery room were she was noted to be in stable condition  It should be noted at the end of the procedure all sponge lap needle instrument counts were correct x2 per the RN       I was present for the entire procedure    Patient Disposition:  PACU     SIGNATURE: Helen Mckay DO  DATE: October 21, 2019  TIME: 10:31 AM

## 2019-10-21 NOTE — ANESTHESIA PREPROCEDURE EVALUATION
Review of Systems/Medical History  Patient summary reviewed    History of anesthetic complications PONV    Cardiovascular  Negative cardio ROS Exercise tolerance (METS): >4,     Pulmonary  Negative pulmonary ROS        GI/Hepatic  Negative GI/hepatic ROS          Negative  ROS        Endo/Other  Negative endo/other ROS      GYN  Negative gynecology ROS          Hematology  Negative hematology ROS      Musculoskeletal  Negative musculoskeletal ROS        Neurology  Negative neurology ROS   Headaches,    Psychology   Negative psychology ROS              Physical Exam    Airway    Mallampati score: II  TM Distance: >3 FB  Neck ROM: full     Dental   No notable dental hx     Cardiovascular  Comment: Negative ROS,     Pulmonary      Other Findings        Anesthesia Plan  ASA Score- 2     Anesthesia Type- general with ASA Monitors  Additional Monitors:   Airway Plan: LMA  Comment: Patient seen and examined, history reviewed  Patient to be done under general anesthesia with LMA and routine monitors  Risks discussed with the patient, consent obtained        Plan Factors-    Induction- intravenous  Postoperative Plan-     Informed Consent- Anesthetic plan and risks discussed with patient  I personally reviewed this patient with the CRNA  Discussed and agreed on the Anesthesia Plan with the CRNA  Michael Enamorado

## 2019-10-21 NOTE — DISCHARGE INSTRUCTIONS
Myomectomy   WHAT YOU NEED TO KNOW:   Myomectomy is surgery to remove one or more myomas from your uterus  Myomas are also called fibroid tumors or leiomyomas  DISCHARGE INSTRUCTIONS:   Medicines:   · Medicines  may be given to prevent or treat a bacterial infection or decrease pain  Ask your healthcare provider how to take prescription pain medicine safely  You may be given medicine to help decrease certain hormones in your blood  You may also need to take iron pills if you have anemia  · Take your medicine as directed  Contact your healthcare provider if you think your medicine is not helping or if you have side effects  Tell him if you are allergic to any medicine  Keep a list of the medicines, vitamins, and herbs you take  Include the amounts, and when and why you take them  Bring the list or the pill bottles to follow-up visits  Carry your medicine list with you in case of an emergency  Follow up with your surgeon or gynecologist as directed: You may need to return for an ultrasound to check for new myomas  Write down your questions so you remember to ask them during your visits  Wound care:  Care for your wound as directed  You may need to wash the wound with soap and water  Dry the area and put on new, clean bandages as directed  Change your bandages when they get wet or dirty  Activity:  Ask your healthcare provider if you should avoid any activities after surgery  Contact your surgeon or gynecologist if:   · You start to bleed more than usual during or between your monthly periods, after your myomas are removed  · You are constipated  · You still cannot get pregnant, even after your myomas have been removed  · You feel new pressure in your abdomen  · You have a fever  · You have nausea, or you are vomiting  · You have new pain in your abdomen, or you have pain that is getting worse  · Your wound is swollen, red, or has pus coming from it    Seek care immediately or call 911 if:   · You feel lightheaded, short of breath, and have chest pain  · You cough up blood  · Your arm or leg feels warm, tender, and painful  It may look swollen and red  · You have any of the following signs of a heart attack:      ¨ Squeezing, pressure, or pain in your chest that lasts longer than 5 minutes or returns    ¨ Discomfort or pain in your back, neck, jaw, stomach, or arm     ¨ Trouble breathing    ¨ Nausea or vomiting    ¨ Lightheadedness or a sudden cold sweat, especially with chest pain or trouble breathing    · You cannot have a bowel movement at all  · You cannot urinate, or you urinate very little  · You have bleeding from your vagina that does not stop  · You suddenly have severe abdominal pain  · Your stitches come apart  © 2017 [employer] Information is for End User's use only and may not be sold, redistributed or otherwise used for commercial purposes  All illustrations and images included in CareNotes® are the copyrighted property of [employer]  or [employer]  The above information is an  only  It is not intended as medical advice for individual conditions or treatments  Talk to your doctor, nurse or pharmacist before following any medical regimen to see if it is safe and effective for you

## 2019-10-21 NOTE — INTERVAL H&P NOTE
H&P reviewed  After examining the patient I find no changes in the patients condition since the H&P had been written      Vitals:    10/21/19 0845   BP: 108/55   Pulse: 79   Resp: 16   Temp: 98 4 °F (36 9 °C)   SpO2: 100%

## 2019-10-26 ENCOUNTER — HOSPITAL ENCOUNTER (OUTPATIENT)
Dept: MRI IMAGING | Facility: HOSPITAL | Age: 34
Discharge: HOME | End: 2019-10-26
Payer: COMMERCIAL

## 2019-10-26 DIAGNOSIS — R42 EPISODIC LIGHTHEADEDNESS: ICD-10-CM

## 2019-10-26 PROCEDURE — 70551 MRI BRAIN STEM W/O DYE: CPT

## 2019-10-29 DIAGNOSIS — R42 EPISODIC LIGHTHEADEDNESS: Primary | ICD-10-CM

## 2019-10-29 DIAGNOSIS — R42 DIZZINESS: ICD-10-CM

## 2020-02-25 DIAGNOSIS — O21.9 NAUSEA AND VOMITING DURING PREGNANCY: Primary | ICD-10-CM

## 2020-02-25 RX ORDER — ONDANSETRON 4 MG/1
4 TABLET, FILM COATED ORAL EVERY 8 HOURS PRN
Qty: 28 TABLET | Refills: 1 | Status: SHIPPED | OUTPATIENT
Start: 2020-02-25 | End: 2020-04-22

## 2020-03-24 ENCOUNTER — TELEMEDICINE (OUTPATIENT)
Dept: OBGYN CLINIC | Facility: CLINIC | Age: 35
End: 2020-03-24

## 2020-03-24 DIAGNOSIS — O09.519 AMA (ADVANCED MATERNAL AGE) PRIMIGRAVIDA 35+, UNSPECIFIED TRIMESTER: ICD-10-CM

## 2020-03-24 DIAGNOSIS — O26.20 HIGH RISK PREGNANCY DUE TO RECURRENT MISCARRIAGE: ICD-10-CM

## 2020-03-24 DIAGNOSIS — O09.92 ENCOUNTER FOR SUPERVISION OF HIGH RISK PREGNANCY IN SECOND TRIMESTER, ANTEPARTUM: Primary | ICD-10-CM

## 2020-03-24 DIAGNOSIS — Z3A.24 24 WEEKS GESTATION OF PREGNANCY: ICD-10-CM

## 2020-03-24 PROCEDURE — OBC: Performed by: NURSE PRACTITIONER

## 2020-03-24 NOTE — PROGRESS NOTES
OB Intake    Patient presents for OB intake interview via TEAM video visit  Surrounded by her children  FOB not present   Planned pregnancy and followed with Dr Diamante Blanchard  She is currently under her care  FOB George involved and supportive  ;   RH NEGATIVE  Currently using vaginal progesterone supp 2 x/day until 12 weeks  Discontinued ASA at 7weeks due to subchorionic hemorrhage  Referred to [facility] for sequential screen  Hx of  delivery prior to 36 weeks 6 days: no  Hx of hypertension:  no   Patient's last menstrual period was 10/02/2019 (exact date)  Estimated Date of Delivery: 10/20/2020  Signs and Symptoms of pregnancy:  - breast tenderness and nausea- no need for treatment  - Constipation: no  - Headaches: no  - Cramping/spotting: no  - PICA cravings: no  - Diabetes: If you answer yes, please order 1 hr gtt testing, 50grams   History of gestational diabetes no   BMI >35  no   Advance maternal age >35 yes   First degree relative with type 2 diabetes no   History of PCOS no   Current metformin use no   Prior history of macrosomia or LGA- Llast delivery at 41 weeks at 9 lbs    Prenatal labs req given  Last Pap:  18 Normal pap with negative HR HPV  Tdap - counseled to be given after 27 weeks  Influenza vaccine discussed and information sheet given  Vaccinated: no  Immunization Record  Immunization History   Administered Date(s) Administered    INFLUENZA 2019    Rho (D) Immune Globulin 08/10/2018, 2018, 2019    Tdap 2014     Hx of MRSA: no  Dental visit with last 6 months - no  If no, recommendations discussed  Negative 2 questions depression screen     She has Severianot already    Nurse Family Partnership: No  ONAF form submitted: No    Interview Education:  HIV and other prenatal tests  Discussed; she will likely follow up at [facility] due to cost  Risk factors identified by prenatal history discussed  Anticipated course of prenatal care discussed  Nutrition counseling; special diet, dietary precautions (mercury, listeriosis) discussed  Weight gain counseling discussed  Toxoplasmosis precautions (cats/raw meat/unwashed vegetables) ; they have one cat/George changing the litter  Safe sexual activity discussed  Exercise discussed  Influenza vaccine declined yet strongly encouraged  Dental care discussed and had recent dental cleaning  Environmental/work hazards discussed; works as [employer] and as an  (not skating this pregnancy)  Avoidance of sauna or hot tubs discussed  Teratogens discussed  Avoid travel where risk of congenitally transmitted infection(s) is high discussed and discussed ways to limit exposure to COVID 19  FOB does not smoke but uses chewing tobacco; she is a non smoker  Alcohol, illicit/recreational drugs discussed  Breastfeeding planned  Screening for aneuploidy discussed  Use of any medications (including supplements, vitamins, herbs or OTC drugs) discussed  Indications for ultrasonography discussed  Intimate partner violence discussed and screened negative today  Seat belt use discussed      Interview done by : NEVA Berrios       03/24/20      Virtual Regular Visit    Problem List Items Addressed This Visit     None               Reason for visit is OB intake    Encounter provider NEVA Berrios    Provider located at [address] [place] [address]      Recent Visits  No visits were found meeting these conditions  Showing recent visits within past 7 days and meeting all other requirements     Future Appointments  No visits were found meeting these conditions  Showing future appointments within next 150 days and meeting all other requirements        After connecting through [employer], the patient was identified by name and date of birth   Sharon Kemar was informed that this is a telemedicine visit and that the visit is being conducted through [employer] which may not be secure and therefore, might not be HIPAA-compliant  My home office door was closed  No one else was in the room  She acknowledged consent and understanding of privacy and security of the video platform  The patient has agreed to participate and understands they can discontinue the visit at any time  Past Medical History:   Diagnosis Date    Allergy     seasonal allergy    Anxiety     Cold intolerance     Resolved 12/15/2015     Migraine     Miscarriage     x 4    Pelvic pressure in female     Resolved 12/15/2015     PONV (postoperative nausea and vomiting)     Patient requests to be pre-medicated for N/V prior to all surgeries   Also notes some light-headedness and slow to awake s/p anesthesia    Recurrent pregnancy loss     Varicella 1989    Vomiting during pregnancy     Resolved 12/15/2015     Wears glasses        Past Surgical History:   Procedure Laterality Date    CONDYLOMA EXCISION/FULGURATION  May 2007    CYST REMOVAL      POPLITEAL SYNOVIAL CYST EXCISION      Resolved 8/2007     AK HYSTEROSCOPY,W/ENDO BX N/A 10/21/2019    Procedure: HYSTEROSCOPY; BIOPSY (Miguel Ángel Olvera); polypectomy;  Surgeon: Fatima Dance, DO;  Location: [facility];  Service: Gynecology    WISDOM TOOTH EXTRACTION         Current Outpatient Medications   Medication Sig Dispense Refill    Cholecalciferol (VITAMIN D3) 3000 units TABS Take by mouth      Cobalamine Combinations (B12 FOLATE PO) Take by mouth      Coenzyme Q10 (COQ10 PO) Take by mouth      desogestrel-ethinyl estradiol (APRI) 0 15-30 MG-MCG per tablet Take 1 tablet by mouth daily      ondansetron (ZOFRAN) 4 mg tablet Take 1 tablet (4 mg total) by mouth every 8 (eight) hours as needed for nausea or vomiting 28 tablet 1    Prenatal Vit-Fe Fumarate-FA (PRENATAL 1 PLUS 1 PO) Take by mouth      Probiotic Product (PROBIOTIC-10) CAPS Take by mouth      Vitamin Mixture (VITAMIN E COMPLETE PO) Take by mouth       No current facility-administered medications for this visit  Allergies   Allergen Reactions    Cefaclor Hives       Review of Systems-negative other than mastalgia and intermittent nausea for which she is taking an unknown prescribed medication (possibly dicelgis)  Physical Exam N/A    I spent 60 minutes with the patient during this visit

## 2020-03-24 NOTE — PATIENT INSTRUCTIONS
Pregnancy Diet   WHAT YOU NEED TO KNOW:   What is a healthy diet during pregnancy? A healthy diet during pregnancy is a meal plan that provides the amount of calories and nutrients you need during pregnancy  Your body needs extra calories and nutrients to support your growing baby  You need to gain the right amount of weight for a healthy baby and pregnancy  Babies born at a healthy weight have a lower risk of certain health problems at birth and later in life  A healthy diet may help you avoid gaining too much weight  Too much weight gain may cause problems for you during your pregnancy and delivery  What should I avoid while I am pregnant? · Alcohol:  Do not drink alcohol during pregnancy  Alcohol can increase your risk of a miscarriage (losing your baby)  Your baby may also be born too small and have other health problems, such as learning problems later in life  · Caffeine: It is not clear how caffeine affects pregnancy  Limit your intake of caffeine to avoid possible health problems  Caffeine may be found in coffee, tea, cola, sports drinks, and chocolate  · Foods that contain mercury:  Mercury is naturally found in almost all types of fish and shellfish  Some types of fish absorb higher levels of mercury that can be harmful to an unborn baby  Eat only fish and shellfish that are low in mercury  Each week, you may eat up to 12 ounces of fish or shellfish that have low levels of mercury  These include shrimp, canned light tuna, salmon, pollock, and catfish  Eat only 6 ounces of albacore (white) tuna per week  Albacore tuna has more mercury than canned tuna  Do not eat shark, swordfish, jes mackerel, or tilefish  · Raw and undercooked foods: You should not eat undercooked or raw meat, poultry, eggs, fish, or shellfish (shrimp, crab, lobster)  Cook leftover foods and ready-to-eat foods such as hot dogs until they are steaming hot       · Unpasteurized food:  Unpasteurized foods are foods that have not gone through the heating process (pasteurization) that destroys bacteria  You should not drink milk, juice, or cheese that has not been pasteurized  This includes Brie, feta, Camembert, blue, and [place] cheeses  Which foods can I eat while I am pregnant? Eat a variety of foods from each of the food groups listed below  Your dietitian will tell you how many servings you should have from each food group each day to get enough calories  The amount of calories you need depends on your daily activity, your weight before pregnancy, and current weight gain  Healthcare providers divide pregnancy into 3 periods of time called trimesters  In the first trimester, you usually do not need extra calories  In the second and third trimesters, most women should eat about 300 extra calories each day  · Fruits and vegetables:  Half of your plate should contain fruits and vegetables  ¨ Fruits:  Choose fresh, canned, or dried fruit as often as possible  ¨ 1 cup of sliced, diced, cooked, or canned fruit (canned in light syrup or 100% juice)    ¨ 1 large peach, orange, or banana    ¨ ½ cup of dried fruit    ¨ 1 cup of fruit juice    ¨ Vegetables:  Eat more dark green, red, and orange vegetables  Dark green vegetables include broccoli, spinach, prema lettuce, and derrick greens  Examples of orange and red vegetables are carrots, sweet potatoes, winter squash, oranges, and red peppers  ¨ 1 cup of cooked or raw vegetables    ¨ 1 cup of vegetable juice    ¨ 2 cups of raw leafy greens    · Grains:  Half of the grains you eat each day should be whole grains       ¨ Whole grains:      ¨ ½ cup of cooked brown rice or cooked oatmeal    ¨ 1 cup (1 ounce) of whole-grain dry cereal    ¨ 1 slice of 733% whole-wheat or rye bread    ¨ 3 cups of popped popcorn    ¨ Other grains:      ¨ ½ cup of cooked white rice or pasta    ¨ ½ of an English muffin    ¨ 1 small flour or corn tortilla    ¨ 1 mini-bagel    · Dairy foods:  Choose fat-free or low-fat dairy foods:    ¨ 1½ ounces of hard cheese (mozzarella, Swiss, cheddar)     ¨ 1 cup (8 ounces) of low-fat or fat-free milk or yogurt    ¨ 1 cup of low-fat frozen yogurt or pudding    · Meat and other protein sources:  Choose lean meats and poultry  Bake, broil, and grill meat instead of frying it  Include a variety of seafood in place of some meat and poultry each week  Eat a variety of protein foods:    ¨ ½ ounce of nuts (12 almonds, 24 pistachios, 7 walnut halves) or 1 tablespoon of peanut butter (1 ounce)    ¨ ¼ cup of soy tofu or tempeh (1 ounce)    ¨ 1 egg    ¨ ¼ cup of cooked dried beans, peas, or lentils (1 ounce)    ¨ 1 small chicken breast or 1 small trout (about 3 ounces)    ¨ 1 salmon steak (4 to 6 ounces)    ¨ 1 small lean hamburger (2 to 3 ounces)    · Fats:  Limit saturated fats, trans fats, and cholesterol  These unhealthy fats are found in shortening, butter, stick margarine, and animal fat  Choose healthy fats such as polyunsaturated and monounsaturated fats:     ¨ 1 tablespoon of canola, olive, corn, sunflower, or soybean oil    ¨ 1 tablespoon of soft margarine    ¨ 1 teaspoon of mayonnaise    ¨ 2 tablespoons of salad dressing    ¨ ½ of an avocado  What vitamin and mineral supplements may I need? Your healthcare provider will tell you if you need a supplement and the type you should take  Talk to your healthcare provider before you take any other kind of supplement, including herbal (natural) supplements  · Prenatal vitamins:  Eat a variety of healthy foods, even if you take a prenatal vitamin  If you forget to take your vitamin, do not take double the amount the next day  · Folic acid:  You need at least 847 mcg of folic acid each day before you get pregnant  Folic acid helps to form your baby's brain and spinal cord in early pregnancy  During pregnancy, your daily need for folic acid increases to about 600 mcg   Get folic acid each day by eating citrus fruits and juices, green leafy vegetables, liver, or dried beans  Folic acid is also added to foods such as breakfast cereals, bread products, flour, and pasta  · Iron:  Iron is a mineral the body needs to make hemoglobin, which is a part of red blood cells  Hemoglobin helps your blood carry oxygen from the lungs to the rest of your body  Foods that are good sources of iron are meat, poultry, fish, beans, spinach, and fortified cereals and breads  Your body will absorb iron better from non-meat sources if you have a source of vitamin C at the same time  Drink tea and coffee separately from iron-fortified foods and iron supplements  You need about 30 mg of iron each day during pregnancy  · Calcium and vitamin D:  Women who do not eat dairy products may need a calcium and vitamin D supplement  Talk to your healthcare provider about calcium supplements if you do not regularly eat good sources of calcium  The amount of calcium you need is about 1,300 mg if you are between 15and 25years old and 1,000 mg if you are 23to 48years old  What diet changes may help if I have morning sickness? Morning sickness is common during the first few months of pregnancy  You may feel nauseated, and you may vomit several times each day  To improve symptoms of morning sickness, eat small, frequent meals instead of 3 large meals  Foods high in carbohydrate, such as crackers, dry toast, and pasta, may be easier for you to eat  Drink liquids between meals rather than with meals  What diet changes may decrease constipation? A high-fiber diet can improve the symptoms of constipation  Whole-grain breakfast cereals, whole-grain breads, and prune juice are high in fiber  Raw fruits and vegetables, and cooked beans are also good sources of fiber  It may also be helpful to increase your intake of fluids and get regular physical activity  Talk with your healthcare provider before you begin any exercise program    What diet changes may decrease heartburn? To improve the symptoms of heartburn, do not lie down right after you eat  When you do lie down, sleep with your head slightly elevated  Eat small, frequent meals instead of 3 large meals  It may also be helpful to avoid caffeine, chocolate, and spicy foods  How can I get enough calcium if I cannot tolerate dairy foods? If you cannot drink milk or eat dairy foods, try lactose-free or lactose-reduced milk or calcium-fortified soy milk  Ask your healthcare provider about pills you can take to help you digest milk products  Eat other drinks and foods that are fortified with calcium, such as orange juice  What other healthy guidelines should I follow? · Vegetarians and vegans: If you are a vegetarian or vegan, get enough protein, vitamin B12, and iron during your pregnancy  Some non-meat sources of these nutrients are fortified cereals, nut butters, soy products (tofu and soymilk), nuts, grains, and legumes  These nutrients are also found in eggs and milk products  · Cravings: You may have cravings for certain foods during your pregnancy  Foods that are high in calories, fat, and sugar should not replace healthy food choices  Some women have cravings for unusual substances such as donna, dirt, laundry starch, ice, and chalk  This condition is called pica  These may lead to health problems such as anemia and cause other health problems  When should I contact my healthcare provider? · You are losing weight without trying  · You have cravings for substances such as donna, dirt, laundry starch, or ice  · You have questions or concerns about your condition or care  CARE AGREEMENT:   You have the right to help plan your care  Discuss treatment options with your caregivers to decide what care you want to receive  You always have the right to refuse treatment  The above information is an  only  It is not intended as medical advice for individual conditions or treatments   Talk to your doctor, nurse or pharmacist before following any medical regimen to see if it is safe and effective for you  © 2017 [employer] Information is for End User's use only and may not be sold, redistributed or otherwise used for commercial purposes  All illustrations and images included in CareNotes® are the copyrighted property of [employer] M [employer]  or [employer]

## 2020-04-10 ENCOUNTER — TELEPHONE (OUTPATIENT)
Dept: PERINATAL CARE | Facility: CLINIC | Age: 35
End: 2020-04-10

## 2020-04-13 ENCOUNTER — CONSULT (OUTPATIENT)
Dept: PERINATAL CARE | Facility: CLINIC | Age: 35
End: 2020-04-13
Payer: COMMERCIAL

## 2020-04-13 ENCOUNTER — ROUTINE PRENATAL (OUTPATIENT)
Dept: PERINATAL CARE | Facility: CLINIC | Age: 35
End: 2020-04-13
Payer: COMMERCIAL

## 2020-04-13 VITALS
HEIGHT: 66 IN | WEIGHT: 152.8 LBS | DIASTOLIC BLOOD PRESSURE: 74 MMHG | HEART RATE: 105 BPM | BODY MASS INDEX: 24.56 KG/M2 | SYSTOLIC BLOOD PRESSURE: 119 MMHG

## 2020-04-13 DIAGNOSIS — O35.2XX0 HEREDITARY DISEASE IN FAMILY POSSIBLY AFFECTING FETUS, AFFECTING MANAGEMENT OF MOTHER IN PREGNANCY, SINGLE OR UNSPECIFIED FETUS: ICD-10-CM

## 2020-04-13 DIAGNOSIS — Z31.5 ENCOUNTER FOR PROCREATIVE GENETIC COUNSELING: ICD-10-CM

## 2020-04-13 DIAGNOSIS — O26.21 HIGH RISK PREGNANCY DUE TO RECURRENT PREGNANCY LOSS, FIRST TRIMESTER: ICD-10-CM

## 2020-04-13 DIAGNOSIS — O09.519 AMA (ADVANCED MATERNAL AGE) PRIMIGRAVIDA 35+, UNSPECIFIED TRIMESTER: Primary | ICD-10-CM

## 2020-04-13 DIAGNOSIS — O26.21 RECURRENT PREGNANCY LOSS IN PREGNANT PATIENT IN FIRST TRIMESTER, ANTEPARTUM: ICD-10-CM

## 2020-04-13 DIAGNOSIS — O09.521 ELDERLY MULTIGRAVIDA, FIRST TRIMESTER: Primary | ICD-10-CM

## 2020-04-13 DIAGNOSIS — Z3A.12 12 WEEKS GESTATION OF PREGNANCY: ICD-10-CM

## 2020-04-13 LAB
EXTERNAL HEPATITIS B SURFACE ANTIGEN: NORMAL
EXTERNAL HIV-1/2 AB-AG: NORMAL
EXTERNAL RUBELLA IGG QUANTITATION: NORMAL
EXTERNAL SYPHILIS RPR SCREEN: NORMAL

## 2020-04-13 PROCEDURE — 76801 OB US < 14 WKS SINGLE FETUS: CPT | Performed by: OBSTETRICS & GYNECOLOGY

## 2020-04-13 PROCEDURE — 76813 OB US NUCHAL MEAS 1 GEST: CPT | Performed by: OBSTETRICS & GYNECOLOGY

## 2020-04-13 PROCEDURE — NC001 PR NO CHARGE

## 2020-04-13 PROCEDURE — 1036F TOBACCO NON-USER: CPT | Performed by: OBSTETRICS & GYNECOLOGY

## 2020-04-13 PROCEDURE — 99212 OFFICE O/P EST SF 10 MIN: CPT | Performed by: OBSTETRICS & GYNECOLOGY

## 2020-04-14 LAB
ABO GROUP BLD: NORMAL
BASOPHILS # BLD AUTO: 27 CELLS/UL (ref 0–200)
BASOPHILS NFR BLD AUTO: 0.3 %
BLD GP AB SCN SERPL QL: NORMAL
EOSINOPHIL # BLD AUTO: 178 CELLS/UL (ref 15–500)
EOSINOPHIL NFR BLD AUTO: 2 %
ERYTHROCYTE [DISTWIDTH] IN BLOOD BY AUTOMATED COUNT: 14.8 % (ref 11–15)
HBV SURFACE AG SERPL QL IA: NORMAL
HCT VFR BLD AUTO: 35.1 % (ref 35–45)
HCV AB S/CO SERPL IA: 0.01
HCV AB SERPL QL IA: NORMAL
HGB BLD-MCNC: 11.7 G/DL (ref 11.7–15.5)
HIV 1+2 AB+HIV1 P24 AG SERPL QL IA: NORMAL
LYMPHOCYTES # BLD AUTO: 1833 CELLS/UL (ref 850–3900)
LYMPHOCYTES NFR BLD AUTO: 20.6 %
MCH RBC QN AUTO: 29.2 PG (ref 27–33)
MCHC RBC AUTO-ENTMCNC: 33.3 G/DL (ref 32–36)
MCV RBC AUTO: 87.5 FL (ref 80–100)
MONOCYTES # BLD AUTO: 454 CELLS/UL (ref 200–950)
MONOCYTES NFR BLD AUTO: 5.1 %
NEUTROPHILS # BLD AUTO: 6408 CELLS/UL (ref 1500–7800)
NEUTROPHILS NFR BLD AUTO: 72 %
PLATELET # BLD AUTO: 188 THOUSAND/UL (ref 140–400)
PMV BLD REES-ECKER: 11.7 FL (ref 7.5–12.5)
RBC # BLD AUTO: 4.01 MILLION/UL (ref 3.8–5.1)
RH BLD: NORMAL
RPR SER QL: NORMAL
RUBV IGG SERPL IA-ACNC: 1.8 INDEX
T4 FREE SERPL-MCNC: 1.3 NG/DL (ref 0.8–1.8)
TSH SERPL-ACNC: 0.39 MIU/L
VZV IGG SER IA-ACNC: 176.1 INDEX
WBC # BLD AUTO: 8.9 THOUSAND/UL (ref 3.8–10.8)

## 2020-04-18 LAB
# FETUSES US: 1
CFDNA.FET/TOTAL PLAS.CFDNA: NORMAL
FET CHR 13 TS PLAS.CFDNA QL: NEGATIVE
FET CHR 18 TS PLAS.CFDNA QL: NEGATIVE
FET CHR 21 TS PLAS.CFDNA QL: NEGATIVE
FET CHR X + Y ANEUP PLAS.CFDNA QL: NORMAL
FET CHROM X + Y ANEUP CFDNA IMP: NORMAL
FET Y CHROM PLAS.CFDNA QL: DETECTED
FET Y CHROM PLAS.CFDNA: NORMAL
GA (DAYS): 6 D
GA (WEEKS): 12 WK
MICRODELETION SYND BLD/T FISH: NORMAL
REF LAB TEST METHOD: NORMAL
SERVICE CMNT-IMP: NORMAL
SERVICE CMNT-IMP: NORMAL
SL AMB ABNORMAL MSS?: NORMAL
SL AMB ABNORMAL US?: NORMAL
SL AMB ADVANCED MATERNAL AGE?: YES
SL AMB MICRODELETION INTERP: NORMAL
SL AMB MICRODELETION: NORMAL
SL AMB PERSONAL/FAM HISTORY?: NORMAL
SL AMB SPECIFICATIONS: NORMAL

## 2020-04-22 ENCOUNTER — ROUTINE PRENATAL (OUTPATIENT)
Dept: OBGYN CLINIC | Facility: CLINIC | Age: 35
End: 2020-04-22

## 2020-04-22 VITALS — SYSTOLIC BLOOD PRESSURE: 112 MMHG | BODY MASS INDEX: 24.86 KG/M2 | DIASTOLIC BLOOD PRESSURE: 64 MMHG | WEIGHT: 154 LBS

## 2020-04-22 DIAGNOSIS — O09.92 ENCOUNTER FOR SUPERVISION OF HIGH RISK PREGNANCY IN SECOND TRIMESTER, ANTEPARTUM: Primary | ICD-10-CM

## 2020-04-22 DIAGNOSIS — Z34.81 ENCOUNTER FOR SUPERVISION OF OTHER NORMAL PREGNANCY IN FIRST TRIMESTER: ICD-10-CM

## 2020-04-22 DIAGNOSIS — Z3A.14 14 WEEKS GESTATION OF PREGNANCY: ICD-10-CM

## 2020-04-22 DIAGNOSIS — Z11.3 SCREENING FOR STDS (SEXUALLY TRANSMITTED DISEASES): ICD-10-CM

## 2020-04-22 PROCEDURE — 87591 N.GONORRHOEAE DNA AMP PROB: CPT | Performed by: OBSTETRICS & GYNECOLOGY

## 2020-04-22 PROCEDURE — PNV: Performed by: OBSTETRICS & GYNECOLOGY

## 2020-04-22 PROCEDURE — 87491 CHLMYD TRACH DNA AMP PROBE: CPT | Performed by: OBSTETRICS & GYNECOLOGY

## 2020-04-25 LAB
C TRACH DNA SPEC QL NAA+PROBE: NEGATIVE
N GONORRHOEA DNA SPEC QL NAA+PROBE: NEGATIVE

## 2020-05-14 ENCOUNTER — TELEPHONE (OUTPATIENT)
Dept: PERINATAL CARE | Facility: CLINIC | Age: 35
End: 2020-05-14

## 2020-05-29 ENCOUNTER — TELEPHONE (OUTPATIENT)
Dept: PERINATAL CARE | Facility: CLINIC | Age: 35
End: 2020-05-29

## 2020-06-01 ENCOUNTER — ROUTINE PRENATAL (OUTPATIENT)
Dept: PERINATAL CARE | Facility: CLINIC | Age: 35
End: 2020-06-01
Payer: COMMERCIAL

## 2020-06-01 VITALS
TEMPERATURE: 97.4 F | DIASTOLIC BLOOD PRESSURE: 57 MMHG | WEIGHT: 160 LBS | HEART RATE: 91 BPM | HEIGHT: 66 IN | SYSTOLIC BLOOD PRESSURE: 124 MMHG | BODY MASS INDEX: 25.71 KG/M2

## 2020-06-01 DIAGNOSIS — Z36.86 ENCOUNTER FOR ANTENATAL SCREENING FOR CERVICAL LENGTH: ICD-10-CM

## 2020-06-01 DIAGNOSIS — O09.522 ELDERLY MULTIGRAVIDA, SECOND TRIMESTER: Primary | ICD-10-CM

## 2020-06-01 DIAGNOSIS — Z3A.19 19 WEEKS GESTATION OF PREGNANCY: ICD-10-CM

## 2020-06-01 PROCEDURE — 76811 OB US DETAILED SNGL FETUS: CPT | Performed by: OBSTETRICS & GYNECOLOGY

## 2020-06-01 PROCEDURE — 76817 TRANSVAGINAL US OBSTETRIC: CPT | Performed by: OBSTETRICS & GYNECOLOGY

## 2020-06-01 PROCEDURE — 99212 OFFICE O/P EST SF 10 MIN: CPT | Performed by: OBSTETRICS & GYNECOLOGY

## 2020-06-01 PROCEDURE — 1036F TOBACCO NON-USER: CPT | Performed by: OBSTETRICS & GYNECOLOGY

## 2020-06-03 ENCOUNTER — TELEPHONE (OUTPATIENT)
Dept: OBGYN CLINIC | Facility: CLINIC | Age: 35
End: 2020-06-03

## 2020-06-04 ENCOUNTER — ROUTINE PRENATAL (OUTPATIENT)
Dept: OBGYN CLINIC | Facility: CLINIC | Age: 35
End: 2020-06-04

## 2020-06-04 VITALS — SYSTOLIC BLOOD PRESSURE: 102 MMHG | BODY MASS INDEX: 25.99 KG/M2 | WEIGHT: 161 LBS | DIASTOLIC BLOOD PRESSURE: 60 MMHG

## 2020-06-04 DIAGNOSIS — O09.522 ELDERLY MULTIGRAVIDA, SECOND TRIMESTER: ICD-10-CM

## 2020-06-04 DIAGNOSIS — Z3A.20 20 WEEKS GESTATION OF PREGNANCY: Primary | ICD-10-CM

## 2020-06-04 PROBLEM — O02.1 MISSED ABORTION: Status: RESOLVED | Noted: 2019-04-10 | Resolved: 2020-06-04

## 2020-06-04 PROBLEM — Z31.69 PRE-CONCEPTION COUNSELING: Status: RESOLVED | Noted: 2018-10-01 | Resolved: 2020-06-04

## 2020-06-04 PROBLEM — Z01.419 WELL WOMAN EXAM: Status: RESOLVED | Noted: 2018-11-03 | Resolved: 2020-06-04

## 2020-06-04 PROCEDURE — PNV: Performed by: OBSTETRICS & GYNECOLOGY

## 2020-06-11 ENCOUNTER — TELEPHONE (OUTPATIENT)
Dept: OBGYN CLINIC | Facility: CLINIC | Age: 35
End: 2020-06-11

## 2020-06-29 ENCOUNTER — TELEPHONE (OUTPATIENT)
Dept: OBGYN CLINIC | Facility: CLINIC | Age: 35
End: 2020-06-29

## 2020-06-30 ENCOUNTER — ROUTINE PRENATAL (OUTPATIENT)
Dept: OBGYN CLINIC | Facility: CLINIC | Age: 35
End: 2020-06-30

## 2020-06-30 VITALS
TEMPERATURE: 98.2 F | WEIGHT: 164 LBS | DIASTOLIC BLOOD PRESSURE: 62 MMHG | BODY MASS INDEX: 26.47 KG/M2 | SYSTOLIC BLOOD PRESSURE: 104 MMHG

## 2020-06-30 DIAGNOSIS — O09.522 ELDERLY MULTIGRAVIDA, SECOND TRIMESTER: ICD-10-CM

## 2020-06-30 DIAGNOSIS — Z3A.24 24 WEEKS GESTATION OF PREGNANCY: ICD-10-CM

## 2020-06-30 DIAGNOSIS — Z34.82 ENCOUNTER FOR SUPERVISION OF OTHER NORMAL PREGNANCY IN SECOND TRIMESTER: Primary | ICD-10-CM

## 2020-06-30 PROCEDURE — PNV: Performed by: OBSTETRICS & GYNECOLOGY

## 2020-07-27 ENCOUNTER — ROUTINE PRENATAL (OUTPATIENT)
Dept: OBGYN CLINIC | Facility: CLINIC | Age: 35
End: 2020-07-27
Payer: COMMERCIAL

## 2020-07-27 VITALS — SYSTOLIC BLOOD PRESSURE: 124 MMHG | DIASTOLIC BLOOD PRESSURE: 68 MMHG | WEIGHT: 167.4 LBS | BODY MASS INDEX: 27.02 KG/M2

## 2020-07-27 DIAGNOSIS — Z67.91 RH NEGATIVE STATUS DURING PREGNANCY IN SECOND TRIMESTER: ICD-10-CM

## 2020-07-27 DIAGNOSIS — O99.019 ANEMIA DURING PREGNANCY: Primary | ICD-10-CM

## 2020-07-27 DIAGNOSIS — O09.522 ELDERLY MULTIGRAVIDA, SECOND TRIMESTER: ICD-10-CM

## 2020-07-27 DIAGNOSIS — Z3A.27 27 WEEKS GESTATION OF PREGNANCY: ICD-10-CM

## 2020-07-27 DIAGNOSIS — O26.892 RH NEGATIVE STATUS DURING PREGNANCY IN SECOND TRIMESTER: ICD-10-CM

## 2020-07-27 DIAGNOSIS — O09.92 ENCOUNTER FOR SUPERVISION OF HIGH RISK PREGNANCY IN SECOND TRIMESTER, ANTEPARTUM: Primary | ICD-10-CM

## 2020-07-27 PROBLEM — O26.22 HIGH RISK PREGNANCY DUE TO RECURRENT PREGNANCY LOSS, SECOND TRIMESTER: Status: ACTIVE | Noted: 2020-04-13

## 2020-07-27 PROCEDURE — PNV: Performed by: OBSTETRICS & GYNECOLOGY

## 2020-07-27 PROCEDURE — 96372 THER/PROPH/DIAG INJ SC/IM: CPT | Performed by: OBSTETRICS & GYNECOLOGY

## 2020-07-27 NOTE — PROGRESS NOTES
Red folder due today   Pt has no problems  Rhogam today, will do TDAP next visit  Temperature: 98 3 F     Rhogam given in right deltoid without difficulty  Urine- glucose and protein negative

## 2020-07-27 NOTE — RESULT ENCOUNTER NOTE
Passed glucose test  Low blood count with normal MCV  Will order ferritin level  May need urine infusions  Negative ab screen

## 2020-07-27 NOTE — PROGRESS NOTES
38 y o  [id number]  female at [address] 157 for PNV  BP : 124/68  TWlb    Patient presents for return OB visit  Patient denies contractions, bleeding or leakage of fluid  Good fetal movement  28 wk labs reviewed  - , Hgb 9 1, plts 156   - reviewed anemia with patient  Recommend iron supplementation twice daily  Precautions provided for constipation and recommendation for stool softener  Consent signed  Ok to blood transfusion  Full Code  Patient plans to breastfeed  Skin to skin, rooming in, delayed cord clamp,  pain management in labor discussed  TDAP recommendations discussed and TDAP to be given at next visit per patient request    Rhogam administered  Fetal kick counts reviewed  Follow up in 2 weeks

## 2020-07-29 LAB
ABO GROUP BLD: NORMAL
BASOPHILS # BLD AUTO: 20 CELLS/UL (ref 0–200)
BASOPHILS NFR BLD AUTO: 0.3 %
BLD GP AB SCN SERPL QL: NORMAL
EOSINOPHIL # BLD AUTO: 101 CELLS/UL (ref 15–500)
EOSINOPHIL NFR BLD AUTO: 1.5 %
ERYTHROCYTE [DISTWIDTH] IN BLOOD BY AUTOMATED COUNT: 14 % (ref 11–15)
FERRITIN SERPL-MCNC: 6 NG/ML (ref 16–154)
GLUCOSE 1H P 50 G GLC PO SERPL-MCNC: 125 MG/DL
HCT VFR BLD AUTO: 29 % (ref 35–45)
HGB BLD-MCNC: 9.1 G/DL (ref 11.7–15.5)
LYMPHOCYTES # BLD AUTO: 1092 CELLS/UL (ref 850–3900)
LYMPHOCYTES NFR BLD AUTO: 16.3 %
MCH RBC QN AUTO: 26 PG (ref 27–33)
MCHC RBC AUTO-ENTMCNC: 31.4 G/DL (ref 32–36)
MCV RBC AUTO: 82.9 FL (ref 80–100)
MONOCYTES # BLD AUTO: 429 CELLS/UL (ref 200–950)
MONOCYTES NFR BLD AUTO: 6.4 %
NEUTROPHILS # BLD AUTO: 5059 CELLS/UL (ref 1500–7800)
NEUTROPHILS NFR BLD AUTO: 75.5 %
PLATELET # BLD AUTO: 156 THOUSAND/UL (ref 140–400)
PMV BLD REES-ECKER: 11.7 FL (ref 7.5–12.5)
RBC # BLD AUTO: 3.5 MILLION/UL (ref 3.8–5.1)
REF LAB TEST NAME: NORMAL
REF LAB TEST: NORMAL
RH BLD: NORMAL
RPR SER QL: NORMAL
SL AMB CLIENT CONTACT: NORMAL
WBC # BLD AUTO: 6.7 THOUSAND/UL (ref 3.8–10.8)

## 2020-07-31 ENCOUNTER — TELEPHONE (OUTPATIENT)
Dept: OBGYN CLINIC | Facility: CLINIC | Age: 35
End: 2020-07-31

## 2020-07-31 PROBLEM — O99.013 ANEMIA DURING PREGNANCY IN THIRD TRIMESTER: Status: ACTIVE | Noted: 2020-07-31

## 2020-07-31 RX ORDER — SODIUM CHLORIDE 9 MG/ML
20 INJECTION, SOLUTION INTRAVENOUS ONCE
Status: CANCELLED | OUTPATIENT
Start: 2020-08-07

## 2020-07-31 NOTE — TELEPHONE ENCOUNTER
----- Message from [facility] DO SHARDA sent at 2/66/8470 11:51 AM EDT -----  Low ferritin  Recommend Iron infusions, LM for patient to call office  Find out where she wants to go and when she wants to start   Would be twice weekly x 6

## 2020-07-31 NOTE — RESULT ENCOUNTER NOTE
Low ferritin  Recommend Iron infusions, LM for patient to call office  Find out where she wants to go and when she wants to start   Would be twice weekly x 6

## 2020-07-31 NOTE — TELEPHONE ENCOUNTER
Reviewed results with patient  States she would refer [facility] if possible Monday is the best day for her due to childcare issues  If not availability at [address] she would prefer [facility]    Routing to provider to order and Janette Almaguer to schedule

## 2020-08-07 ENCOUNTER — HOSPITAL ENCOUNTER (OUTPATIENT)
Dept: INFUSION CENTER | Facility: CLINIC | Age: 35
Discharge: HOME | End: 2020-08-07
Payer: COMMERCIAL

## 2020-08-07 VITALS
SYSTOLIC BLOOD PRESSURE: 107 MMHG | RESPIRATION RATE: 18 BRPM | TEMPERATURE: 97.4 F | DIASTOLIC BLOOD PRESSURE: 70 MMHG | HEART RATE: 102 BPM

## 2020-08-07 DIAGNOSIS — O09.92 ENCOUNTER FOR SUPERVISION OF HIGH RISK PREGNANCY IN SECOND TRIMESTER, ANTEPARTUM: Primary | ICD-10-CM

## 2020-08-07 DIAGNOSIS — O99.013 ANEMIA DURING PREGNANCY IN THIRD TRIMESTER: ICD-10-CM

## 2020-08-07 PROCEDURE — 96365 THER/PROPH/DIAG IV INF INIT: CPT

## 2020-08-07 RX ORDER — SODIUM CHLORIDE 9 MG/ML
20 INJECTION, SOLUTION INTRAVENOUS ONCE
Status: COMPLETED | OUTPATIENT
Start: 2020-08-07 | End: 2020-08-07

## 2020-08-07 RX ORDER — SODIUM CHLORIDE 9 MG/ML
20 INJECTION, SOLUTION INTRAVENOUS ONCE
Status: CANCELLED | OUTPATIENT
Start: 2020-08-08

## 2020-08-07 RX ADMIN — SODIUM CHLORIDE 200 MG: 9 INJECTION, SOLUTION INTRAVENOUS at 10:29

## 2020-08-07 RX ADMIN — SODIUM CHLORIDE 20 ML/HR: 0.9 INJECTION, SOLUTION INTRAVENOUS at 10:23

## 2020-08-07 NOTE — PLAN OF CARE
Problem: Potential for Falls  Goal: Patient will remain free of falls  Description: INTERVENTIONS:  - Assess patient frequently for physical needs  -  Identify cognitive and physical deficits and behaviors that affect risk of falls    -  [place] fall precautions as indicated by assessment   - Educate patient/family on patient safety including physical limitations  - Instruct patient to call for assistance with activity based on assessment  - Modify environment to reduce risk of injury  - Consider OT/PT consult to assist with strengthening/mobility  Outcome: Progressing

## 2020-08-07 NOTE — PROGRESS NOTES
Pt tolerated venofer today without incident  Pt declined AVS but did make future appts before leaving infusion center

## 2020-08-11 ENCOUNTER — ROUTINE PRENATAL (OUTPATIENT)
Dept: OBGYN CLINIC | Facility: CLINIC | Age: 35
End: 2020-08-11
Payer: COMMERCIAL

## 2020-08-11 VITALS — SYSTOLIC BLOOD PRESSURE: 122 MMHG | DIASTOLIC BLOOD PRESSURE: 64 MMHG | BODY MASS INDEX: 27.28 KG/M2 | WEIGHT: 169 LBS

## 2020-08-11 DIAGNOSIS — O26.23 HIGH RISK PREGNANCY DUE TO RECURRENT PREGNANCY LOSS, THIRD TRIMESTER: Primary | ICD-10-CM

## 2020-08-11 DIAGNOSIS — Z23 NEED FOR TDAP VACCINATION: ICD-10-CM

## 2020-08-11 DIAGNOSIS — O09.522 ELDERLY MULTIGRAVIDA, SECOND TRIMESTER: ICD-10-CM

## 2020-08-11 DIAGNOSIS — O99.013 ANEMIA DURING PREGNANCY IN THIRD TRIMESTER: ICD-10-CM

## 2020-08-11 DIAGNOSIS — Z3A.30 30 WEEKS GESTATION OF PREGNANCY: ICD-10-CM

## 2020-08-11 PROBLEM — O09.93 ENCOUNTER FOR SUPERVISION OF HIGH RISK PREGNANCY IN THIRD TRIMESTER, ANTEPARTUM: Status: ACTIVE | Noted: 2020-03-24

## 2020-08-11 PROCEDURE — 90715 TDAP VACCINE 7 YRS/> IM: CPT | Performed by: OBSTETRICS & GYNECOLOGY

## 2020-08-11 PROCEDURE — 90471 IMMUNIZATION ADMIN: CPT | Performed by: OBSTETRICS & GYNECOLOGY

## 2020-08-11 PROCEDURE — PNV: Performed by: OBSTETRICS & GYNECOLOGY

## 2020-08-11 NOTE — PROGRESS NOTES
Temp 98 2  Tdap to be given today  Has infusions scheduled  Overall patient feels well and has no concerns  Brought back her birthing plan/papers

## 2020-08-11 NOTE — PROGRESS NOTES
38 y o  [id number]  female at [address] [facility] 157 for PNV  BP : 122/64  TW  Feeling well    No complaints  Start iron infusions, reviewed that we will repeat her blood counts following completion of her infusions  Tdap given today  Reviewed  labor precautions  Follow-up in 2 weeks

## 2020-08-12 ENCOUNTER — HOSPITAL ENCOUNTER (OUTPATIENT)
Dept: INFUSION CENTER | Facility: CLINIC | Age: 35
Discharge: HOME | End: 2020-08-12
Payer: COMMERCIAL

## 2020-08-12 VITALS
SYSTOLIC BLOOD PRESSURE: 104 MMHG | DIASTOLIC BLOOD PRESSURE: 67 MMHG | TEMPERATURE: 97.9 F | RESPIRATION RATE: 18 BRPM | HEART RATE: 79 BPM

## 2020-08-12 DIAGNOSIS — O99.013 ANEMIA DURING PREGNANCY IN THIRD TRIMESTER: ICD-10-CM

## 2020-08-12 DIAGNOSIS — O09.93 ENCOUNTER FOR SUPERVISION OF HIGH RISK PREGNANCY IN THIRD TRIMESTER, ANTEPARTUM: Primary | ICD-10-CM

## 2020-08-12 PROCEDURE — 96365 THER/PROPH/DIAG IV INF INIT: CPT

## 2020-08-12 RX ORDER — SODIUM CHLORIDE 9 MG/ML
20 INJECTION, SOLUTION INTRAVENOUS ONCE
Status: CANCELLED | OUTPATIENT
Start: 2020-08-14

## 2020-08-12 RX ORDER — SODIUM CHLORIDE 9 MG/ML
20 INJECTION, SOLUTION INTRAVENOUS ONCE
Status: COMPLETED | OUTPATIENT
Start: 2020-08-12 | End: 2020-08-12

## 2020-08-12 RX ADMIN — SODIUM CHLORIDE 20 ML/HR: 0.9 INJECTION, SOLUTION INTRAVENOUS at 12:47

## 2020-08-12 RX ADMIN — SODIUM CHLORIDE 200 MG: 9 INJECTION, SOLUTION INTRAVENOUS at 12:50

## 2020-08-12 NOTE — PROGRESS NOTES
Pt tolerated venofer infusion without incident  Pt was provided with AVS and is aware of future appts

## 2020-08-12 NOTE — PLAN OF CARE
Problem: Potential for Falls  Goal: Patient will remain free of falls  Description: INTERVENTIONS:  - Assess patient frequently for physical needs  -  Identify cognitive and physical deficits and behaviors that affect risk of falls    -  [place] fall precautions as indicated by assessment   - Educate patient/family on patient safety including physical limitations  - Instruct patient to call for assistance with activity based on assessment  - Modify environment to reduce risk of injury  - Consider OT/PT consult to assist with strengthening/mobility  Outcome: Progressing

## 2020-08-14 ENCOUNTER — HOSPITAL ENCOUNTER (OUTPATIENT)
Dept: INFUSION CENTER | Facility: CLINIC | Age: 35
Discharge: HOME | End: 2020-08-14
Payer: COMMERCIAL

## 2020-08-14 VITALS
RESPIRATION RATE: 18 BRPM | SYSTOLIC BLOOD PRESSURE: 108 MMHG | HEART RATE: 81 BPM | DIASTOLIC BLOOD PRESSURE: 68 MMHG | TEMPERATURE: 98.6 F

## 2020-08-14 DIAGNOSIS — O09.93 ENCOUNTER FOR SUPERVISION OF HIGH RISK PREGNANCY IN THIRD TRIMESTER, ANTEPARTUM: Primary | ICD-10-CM

## 2020-08-14 DIAGNOSIS — O99.013 ANEMIA DURING PREGNANCY IN THIRD TRIMESTER: ICD-10-CM

## 2020-08-14 PROCEDURE — 96365 THER/PROPH/DIAG IV INF INIT: CPT

## 2020-08-14 RX ORDER — SODIUM CHLORIDE 9 MG/ML
20 INJECTION, SOLUTION INTRAVENOUS ONCE
Status: COMPLETED | OUTPATIENT
Start: 2020-08-14 | End: 2020-08-14

## 2020-08-14 RX ORDER — SODIUM CHLORIDE 9 MG/ML
20 INJECTION, SOLUTION INTRAVENOUS ONCE
Status: CANCELLED | OUTPATIENT
Start: 2020-08-19

## 2020-08-14 RX ADMIN — SODIUM CHLORIDE 20 ML/HR: 0.9 INJECTION, SOLUTION INTRAVENOUS at 14:55

## 2020-08-14 RX ADMIN — SODIUM CHLORIDE 200 MG: 9 INJECTION, SOLUTION INTRAVENOUS at 15:12

## 2020-08-14 NOTE — PROGRESS NOTES
Pt tolerated Venofer well without any adverse reaction  Pt declined AVS but aware of next appointment

## 2020-08-17 ENCOUNTER — HOSPITAL ENCOUNTER (OUTPATIENT)
Dept: INFUSION CENTER | Facility: CLINIC | Age: 35
Discharge: HOME | End: 2020-08-17
Payer: COMMERCIAL

## 2020-08-17 VITALS
SYSTOLIC BLOOD PRESSURE: 100 MMHG | HEART RATE: 87 BPM | RESPIRATION RATE: 16 BRPM | TEMPERATURE: 97.5 F | DIASTOLIC BLOOD PRESSURE: 56 MMHG

## 2020-08-17 DIAGNOSIS — O09.93 ENCOUNTER FOR SUPERVISION OF HIGH RISK PREGNANCY IN THIRD TRIMESTER, ANTEPARTUM: ICD-10-CM

## 2020-08-17 DIAGNOSIS — O99.013 ANEMIA DURING PREGNANCY IN THIRD TRIMESTER: Primary | ICD-10-CM

## 2020-08-17 PROCEDURE — 96365 THER/PROPH/DIAG IV INF INIT: CPT

## 2020-08-17 RX ORDER — SODIUM CHLORIDE 9 MG/ML
20 INJECTION, SOLUTION INTRAVENOUS ONCE
Status: CANCELLED | OUTPATIENT
Start: 2020-08-19

## 2020-08-17 RX ORDER — SODIUM CHLORIDE 9 MG/ML
20 INJECTION, SOLUTION INTRAVENOUS ONCE
Status: COMPLETED | OUTPATIENT
Start: 2020-08-17 | End: 2020-08-17

## 2020-08-17 RX ADMIN — SODIUM CHLORIDE 200 MG: 9 INJECTION, SOLUTION INTRAVENOUS at 13:42

## 2020-08-17 RX ADMIN — SODIUM CHLORIDE 20 ML/HR: 0.9 INJECTION, SOLUTION INTRAVENOUS at 13:36

## 2020-08-17 NOTE — PLAN OF CARE
Problem: Potential for Falls  Goal: Patient will remain free of falls  Description: INTERVENTIONS:  - Assess patient frequently for physical needs  -  Identify cognitive and physical deficits and behaviors that affect risk of falls    -  [place] fall precautions as indicated by assessment   - Educate patient/family on patient safety including physical limitations  - Instruct patient to call for assistance with activity based on assessment  - Modify environment to reduce risk of injury  - Consider OT/PT consult to assist with strengthening/mobility  Outcome: Progressing

## 2020-08-19 ENCOUNTER — HOSPITAL ENCOUNTER (OUTPATIENT)
Dept: INFUSION CENTER | Facility: CLINIC | Age: 35
Discharge: HOME | End: 2020-08-19
Payer: COMMERCIAL

## 2020-08-19 VITALS
SYSTOLIC BLOOD PRESSURE: 108 MMHG | HEART RATE: 92 BPM | DIASTOLIC BLOOD PRESSURE: 67 MMHG | RESPIRATION RATE: 18 BRPM | TEMPERATURE: 99 F

## 2020-08-19 DIAGNOSIS — O09.93 ENCOUNTER FOR SUPERVISION OF HIGH RISK PREGNANCY IN THIRD TRIMESTER, ANTEPARTUM: Primary | ICD-10-CM

## 2020-08-19 DIAGNOSIS — O99.013 ANEMIA DURING PREGNANCY IN THIRD TRIMESTER: ICD-10-CM

## 2020-08-19 PROCEDURE — 96365 THER/PROPH/DIAG IV INF INIT: CPT

## 2020-08-19 RX ORDER — SODIUM CHLORIDE 9 MG/ML
20 INJECTION, SOLUTION INTRAVENOUS ONCE
Status: COMPLETED | OUTPATIENT
Start: 2020-08-19 | End: 2020-08-19

## 2020-08-19 RX ORDER — SODIUM CHLORIDE 9 MG/ML
20 INJECTION, SOLUTION INTRAVENOUS ONCE
Status: CANCELLED | OUTPATIENT
Start: 2020-08-24

## 2020-08-19 RX ADMIN — SODIUM CHLORIDE 200 MG: 9 INJECTION, SOLUTION INTRAVENOUS at 12:03

## 2020-08-19 RX ADMIN — SODIUM CHLORIDE 20 ML/HR: 0.9 INJECTION, SOLUTION INTRAVENOUS at 11:44

## 2020-08-20 ENCOUNTER — TELEPHONE (OUTPATIENT)
Dept: PERINATAL CARE | Facility: OTHER | Age: 35
End: 2020-08-20

## 2020-08-20 NOTE — TELEPHONE ENCOUNTER
Attempted to reach patient by phone and left voicemail to confirm appointment for MFM ultrasound  1 support person ( must be over the age of 15) may accompany you for your appointment  If you or your support person have traveled outside the state in the past 2 weeks, please call and notify our office today #[phone]  You and your support person must wear a mask ,covering nose and mouth,during your entire visit  You and your support person will have temperature screened upon arrival     To minimize your exposure in our waiting room, please call our office prior to entering the building  Check in and rooming questions will be done via phone  We will give you directions when to enter for your appointment  Inside office # provided:  [place] Oyster line: [phone]  [place] line:  [phone]  [facility] line:  [phone] line:  [phone]  Minerva Connor line:  [phone]  [place] line:  [phone]    IF you are not feeling well- cough, fever, shortness of breath or any flu like symptoms, contact your primary care physician or [phone]    Any questions with these instructions please call Maternal Fetal Medicine nurse line today @ # [phone]

## 2020-08-21 PROBLEM — O09.523 AMA (ADVANCED MATERNAL AGE) MULTIGRAVIDA 35+, THIRD TRIMESTER: Status: ACTIVE | Noted: 2020-04-13

## 2020-08-21 NOTE — PATIENT INSTRUCTIONS
Thank you for choosing us for your  care today  If you have any questions about your ultrasound or care, please do not hesitate to contact us or your primary obstetrician  Some general instructions for your pregnancy are:     Exercise: Aim for 22 minutes per day (150 minutes per week!) of regular exercise  This is obviously hard to do during a pandemic but walking is great   Nutrition: aim for calcium-rich and iron-rich foods as well as healthy sources of protein  This will help you gain a healthy amount of weight   Protect against the flu: get yourself and your entire household vaccinated against influenza   Protect against coronavirus: practice social distancing, wear a mask in public, and wash your hands often  This virus can be spread easily by people without symptoms  Notify your primary care doctor if you have any symptoms including cough, shortness of breath or difficulty breathing, fever, chills, muscle pain, sore throat, or new loss of taste or smell   Learn about Preeclampsia: preeclampsia is a common, serious complication in pregnancy  A blood pressure of 140mmHg (top number or systolic) OR 46RLIP (bottom number or diastolic) is not normal and needs evaluation by your doctor   If you smoke, try to reduce how many cigarettes you smoke or quit completely  Do not vape   Other warning signs to watch out for in pregnancy or postpartum: chest pain, obstructed breathing or shortness of breath, seizures, thoughts of hurting yourself or your baby, bleeding, a painful or swollen leg, fever, or headache ([facility] POST-BIRTH Warning Signs campaign)  If these happen call 911  Itching is also not normal in pregnancy and if you experience this, especially over your hands and feet, potentially worse at night, notify your doctors

## 2020-08-24 ENCOUNTER — HOSPITAL ENCOUNTER (OUTPATIENT)
Dept: INFUSION CENTER | Facility: CLINIC | Age: 35
Discharge: HOME | End: 2020-08-24
Payer: COMMERCIAL

## 2020-08-24 ENCOUNTER — ULTRASOUND (OUTPATIENT)
Dept: PERINATAL CARE | Facility: OTHER | Age: 35
End: 2020-08-24
Payer: COMMERCIAL

## 2020-08-24 VITALS
TEMPERATURE: 99.1 F | DIASTOLIC BLOOD PRESSURE: 78 MMHG | HEIGHT: 66 IN | HEART RATE: 98 BPM | WEIGHT: 167.8 LBS | BODY MASS INDEX: 26.97 KG/M2 | SYSTOLIC BLOOD PRESSURE: 132 MMHG

## 2020-08-24 VITALS
TEMPERATURE: 97.7 F | DIASTOLIC BLOOD PRESSURE: 72 MMHG | SYSTOLIC BLOOD PRESSURE: 110 MMHG | RESPIRATION RATE: 18 BRPM | HEART RATE: 75 BPM

## 2020-08-24 DIAGNOSIS — O09.523 AMA (ADVANCED MATERNAL AGE) MULTIGRAVIDA 35+, THIRD TRIMESTER: Primary | ICD-10-CM

## 2020-08-24 DIAGNOSIS — Z36.89 ENCOUNTER FOR ULTRASOUND TO CHECK FETAL GROWTH: ICD-10-CM

## 2020-08-24 DIAGNOSIS — O09.93 ENCOUNTER FOR SUPERVISION OF HIGH RISK PREGNANCY IN THIRD TRIMESTER, ANTEPARTUM: Primary | ICD-10-CM

## 2020-08-24 DIAGNOSIS — O99.013 ANEMIA DURING PREGNANCY IN THIRD TRIMESTER: ICD-10-CM

## 2020-08-24 PROCEDURE — 96365 THER/PROPH/DIAG IV INF INIT: CPT

## 2020-08-24 PROCEDURE — 3008F BODY MASS INDEX DOCD: CPT | Performed by: OBSTETRICS & GYNECOLOGY

## 2020-08-24 PROCEDURE — 76816 OB US FOLLOW-UP PER FETUS: CPT | Performed by: OBSTETRICS & GYNECOLOGY

## 2020-08-24 PROCEDURE — 1036F TOBACCO NON-USER: CPT | Performed by: OBSTETRICS & GYNECOLOGY

## 2020-08-24 PROCEDURE — 99212 OFFICE O/P EST SF 10 MIN: CPT | Performed by: OBSTETRICS & GYNECOLOGY

## 2020-08-24 RX ORDER — SODIUM CHLORIDE 9 MG/ML
20 INJECTION, SOLUTION INTRAVENOUS ONCE
Status: COMPLETED | OUTPATIENT
Start: 2020-08-24 | End: 2020-08-24

## 2020-08-24 RX ORDER — SODIUM CHLORIDE 9 MG/ML
20 INJECTION, SOLUTION INTRAVENOUS ONCE
Status: CANCELLED | OUTPATIENT
Start: 2020-08-26

## 2020-08-24 RX ADMIN — SODIUM CHLORIDE 200 MG: 9 INJECTION, SOLUTION INTRAVENOUS at 13:27

## 2020-08-24 RX ADMIN — SODIUM CHLORIDE 20 ML/HR: 0.9 INJECTION, SOLUTION INTRAVENOUS at 13:26

## 2020-08-24 NOTE — PLAN OF CARE
Problem: Potential for Falls  Goal: Patient will remain free of falls  Description: INTERVENTIONS:  - Assess patient frequently for physical needs  -  Identify cognitive and physical deficits and behaviors that affect risk of falls    -  [place] fall precautions as indicated by assessment   - Educate patient/family on patient safety including physical limitations  - Instruct patient to call for assistance with activity based on assessment  - Modify environment to reduce risk of injury  - Consider OT/PT consult to assist with strengthening/mobility  Outcome: Progressing

## 2020-08-24 NOTE — PROGRESS NOTES
[address]: Ms Emily Hale was seen today at [address] for fetal growth assessment ultrasound  See ultrasound report under "OB Procedures" tab  Please don't hesitate to contact our office with any concerns or questions    Juani Oleary MD

## 2020-08-24 NOTE — PROGRESS NOTES
Patient tolerated her venofer infusion well without adverse reaction  Patient did not want to make more appointments at this time  She plans on talking to her MD at her appointment on Aug 25th

## 2020-08-25 ENCOUNTER — ROUTINE PRENATAL (OUTPATIENT)
Dept: OBGYN CLINIC | Facility: CLINIC | Age: 35
End: 2020-08-25

## 2020-08-25 VITALS — BODY MASS INDEX: 26.99 KG/M2 | WEIGHT: 167.2 LBS | DIASTOLIC BLOOD PRESSURE: 68 MMHG | SYSTOLIC BLOOD PRESSURE: 130 MMHG

## 2020-08-25 DIAGNOSIS — O99.013 ANEMIA DURING PREGNANCY IN THIRD TRIMESTER: ICD-10-CM

## 2020-08-25 DIAGNOSIS — Z3A.32 32 WEEKS GESTATION OF PREGNANCY: ICD-10-CM

## 2020-08-25 DIAGNOSIS — O09.523 AMA (ADVANCED MATERNAL AGE) MULTIGRAVIDA 35+, THIRD TRIMESTER: ICD-10-CM

## 2020-08-25 DIAGNOSIS — O26.23 HIGH RISK PREGNANCY DUE TO RECURRENT PREGNANCY LOSS, THIRD TRIMESTER: ICD-10-CM

## 2020-08-25 PROCEDURE — PNV: Performed by: OBSTETRICS & GYNECOLOGY

## 2020-08-25 NOTE — PROGRESS NOTES
Temp: 98 2  Patient wanted to discuss her iron infusion therapy  Had u/s at [facility] yesterday - everything was good  Urine neg/neg

## 2020-08-25 NOTE — PROGRESS NOTES
This is a 38 y o  [id number] at 32w0d who presents for return OB visit  No complaints  Denies contractions, leakage, bleeding  Endorses fetal movement   BP: 130/68 TWlb    US on :  EFW (Ac/Fl/Hc)  2078 grams - 4 lbs 9 oz (62%), AC 83%  No further US indicated  Anemia: s/p iron infusions  Will repeat CBC at/around 36 wks for recheck  Patient plans to breastfeed  Contraceptive plan: lactational amenorrhea and then another pregnancy is acceptable   Planning on one more  F/up 2 wks

## 2020-09-08 ENCOUNTER — ROUTINE PRENATAL (OUTPATIENT)
Dept: OBGYN CLINIC | Facility: CLINIC | Age: 35
End: 2020-09-08

## 2020-09-08 VITALS — WEIGHT: 170 LBS | SYSTOLIC BLOOD PRESSURE: 128 MMHG | DIASTOLIC BLOOD PRESSURE: 74 MMHG | BODY MASS INDEX: 27.44 KG/M2

## 2020-09-08 DIAGNOSIS — O09.93 ENCOUNTER FOR SUPERVISION OF HIGH RISK PREGNANCY IN THIRD TRIMESTER, ANTEPARTUM: Primary | ICD-10-CM

## 2020-09-08 DIAGNOSIS — O26.23 HIGH RISK PREGNANCY DUE TO RECURRENT PREGNANCY LOSS, THIRD TRIMESTER: ICD-10-CM

## 2020-09-08 DIAGNOSIS — Z3A.34 34 WEEKS GESTATION OF PREGNANCY: ICD-10-CM

## 2020-09-08 DIAGNOSIS — O99.013 ANEMIA DURING PREGNANCY IN THIRD TRIMESTER: ICD-10-CM

## 2020-09-08 DIAGNOSIS — O09.523 AMA (ADVANCED MATERNAL AGE) MULTIGRAVIDA 35+, THIRD TRIMESTER: ICD-10-CM

## 2020-09-08 PROCEDURE — PNV: Performed by: OBSTETRICS & GYNECOLOGY

## 2020-09-08 NOTE — PROGRESS NOTES
38 y o  [id number]  [facility]  BP : 128/74  TW  Feeling well    No complaints  Reviewed perineal massage  Reviewed  labor precautions and fetal kick counts  Gave repeat CBC and ferritin labs  Follow-up in 2 weeks

## 2020-09-08 NOTE — PROGRESS NOTES
Temp: 98 1  Has concerns about her left foot and swelling  Wanted to know about her iron levels and when to re-check  Urine neg/neg

## 2020-09-19 DIAGNOSIS — D69.6 THROMBOCYTOPENIA COMPLICATING PREGNANCY (HCC): Primary | ICD-10-CM

## 2020-09-19 DIAGNOSIS — O99.119 THROMBOCYTOPENIA COMPLICATING PREGNANCY (HCC): Primary | ICD-10-CM

## 2020-09-19 LAB
BASOPHILS # BLD AUTO: 17 CELLS/UL (ref 0–200)
BASOPHILS NFR BLD AUTO: 0.2 %
EOSINOPHIL # BLD AUTO: 83 CELLS/UL (ref 15–500)
EOSINOPHIL NFR BLD AUTO: 1 %
FERRITIN SERPL-MCNC: 60 NG/ML (ref 16–154)
HCT VFR BLD AUTO: 36.7 % (ref 35–45)
HGB BLD-MCNC: 11.8 G/DL (ref 11.7–15.5)
LYMPHOCYTES # BLD AUTO: 1253 CELLS/UL (ref 850–3900)
LYMPHOCYTES NFR BLD AUTO: 15.1 %
MCH RBC QN AUTO: 29.1 PG (ref 27–33)
MCHC RBC AUTO-ENTMCNC: 32.2 G/DL (ref 32–36)
MCV RBC AUTO: 90.4 FL (ref 80–100)
MONOCYTES # BLD AUTO: 589 CELLS/UL (ref 200–950)
MONOCYTES NFR BLD AUTO: 7.1 %
NEUTROPHILS # BLD AUTO: 6358 CELLS/UL (ref 1500–7800)
NEUTROPHILS NFR BLD AUTO: 76.6 %
PLATELET # BLD AUTO: 105 THOUSAND/UL (ref 140–400)
PMV BLD REES-ECKER: 12.2 FL (ref 7.5–12.5)
RBC # BLD AUTO: 4.06 MILLION/UL (ref 3.8–5.1)
WBC # BLD AUTO: 8.3 THOUSAND/UL (ref 3.8–10.8)

## 2020-09-21 ENCOUNTER — ROUTINE PRENATAL (OUTPATIENT)
Dept: OBGYN CLINIC | Facility: CLINIC | Age: 35
End: 2020-09-21

## 2020-09-21 VITALS — BODY MASS INDEX: 27.76 KG/M2 | SYSTOLIC BLOOD PRESSURE: 118 MMHG | DIASTOLIC BLOOD PRESSURE: 78 MMHG | WEIGHT: 172 LBS

## 2020-09-21 DIAGNOSIS — O26.20 HIGH RISK PREGNANCY DUE TO RECURRENT MISCARRIAGE: ICD-10-CM

## 2020-09-21 DIAGNOSIS — O09.93 ENCOUNTER FOR SUPERVISION OF HIGH RISK PREGNANCY IN THIRD TRIMESTER, ANTEPARTUM: Primary | ICD-10-CM

## 2020-09-21 DIAGNOSIS — D69.6 THROMBOCYTOPENIA COMPLICATING PREGNANCY (HCC): ICD-10-CM

## 2020-09-21 DIAGNOSIS — O26.23 HIGH RISK PREGNANCY DUE TO RECURRENT PREGNANCY LOSS, THIRD TRIMESTER: ICD-10-CM

## 2020-09-21 DIAGNOSIS — O99.119 THROMBOCYTOPENIA COMPLICATING PREGNANCY (HCC): ICD-10-CM

## 2020-09-21 DIAGNOSIS — Z34.83 ENCOUNTER FOR SUPERVISION OF OTHER NORMAL PREGNANCY IN THIRD TRIMESTER: ICD-10-CM

## 2020-09-21 DIAGNOSIS — O99.013 ANEMIA DURING PREGNANCY IN THIRD TRIMESTER: ICD-10-CM

## 2020-09-21 DIAGNOSIS — Z3A.35 35 WEEKS GESTATION OF PREGNANCY: ICD-10-CM

## 2020-09-21 DIAGNOSIS — O09.523 AMA (ADVANCED MATERNAL AGE) MULTIGRAVIDA 35+, THIRD TRIMESTER: ICD-10-CM

## 2020-09-21 PROCEDURE — PNV: Performed by: OBSTETRICS & GYNECOLOGY

## 2020-09-21 PROCEDURE — 87653 STREP B DNA AMP PROBE: CPT | Performed by: OBSTETRICS & GYNECOLOGY

## 2020-09-21 NOTE — PROGRESS NOTES
Repeat CBC ordered  gbs ordered  C/o 2 lumps under armpit-right side, states they have grown and are sore  C/o lump in vaginal area, denies pain/growth   temp of 97 8 F  Urine dip neg glucose/+1 protein

## 2020-09-21 NOTE — PROGRESS NOTES
38 y o  [id number]  female at [address] 157 for PNV  BP : 118/78  TWlb    Patient presents for return OB visit  She reports 2 lumps in her R axilla that are sore and have grown  Palpated area, likely lymph node in central axilla secondary to breast engorgement as delivery approaches  Second lump not palpated  Reassurance provided  She also notes a lump in her vaginal area  Report hx of genital warts that were treated with topical acid and have not returned  She is unsure if the lump is related to that or if warts have returned  Examination shows angioedema appearing lesion on L vulva  No erythema and superficial to palpation  Recommended to monitor and advised to not pop or extract anything from it as it is likely to bleed and return  Patient expressed understanding  GBS collected  NO allergies to penicillin  Tolerated penicillin in past  Allergy to cefaclor (2nd gen cephalosporin)  Gestational thrombocytopenia -- weekly platelets until delivery  CBC ordered  Plts 20 105k  SVE deferred per patient request   Vertex presentation confirmed on abdominal US  Delivery discussed -- has had elective IOL for 4 past deliveries  Would prefer to wait until after due date for IOL but does not want to wait until 41 weeks for IOL  Will plan to set up 40 week IOL and membranes sweep at 39 weeks  Contraception: lactational amenorrhea then planning to attempt conception  Planning for 1 more child  Follow up in 1 week  The patient location is: home  The chief complaint leading to consultation is: review CT scan, left thigh pain r/o mass  Visit type: Virtual visit with synchronous audio and video  Total time spent with patient: 10 min  Each patient to whom he or she provides medical services by telemedicine is:  (1) informed of the relationship between the physician and patient and the respective role of any other health care provider with respect to management of the patient; and (2) notified that he or she may decline to receive medical services by telemedicine and may withdraw from such care at any time.    Notes:         General Surgery                History & Physical      Subjective:         Patient ID: Michael Garcia is a 16 y.o. female.    Chief Complaint: Follow-up    Here for f/u to review CT scan. Patient and her mother report that swelling to left thigh has subsided some but not completely. She still has occasional discomfort but no pain requiring medication. Symptoms have improved since she has been resting and not in school.     Review of Systems   Constitutional: Negative for unexpected weight change.   HENT: Negative for congestion.    Eyes: Negative for visual disturbance.   Respiratory: Negative for shortness of breath.    Cardiovascular: Negative for chest pain.   Gastrointestinal: Negative for abdominal pain.   Genitourinary: Negative for difficulty urinating.   Skin: Negative for rash.   Allergic/Immunologic: Negative for immunocompromised state.   Neurological: Negative for weakness.   Psychiatric/Behavioral: The patient is not nervous/anxious.          Objective:      Physical Exam   Constitutional: She appears well-developed and well-nourished. No distress.       Unable to assess leg - video visit    Reviewed CT can by video with patient and her mother - showed no soft tissue or muscular mass causing asymmetry      Assessment/Plan:   Left thigh pain  -     Ambulatory referral/consult to Physical Therapy;  Future; Expected date: 04/03/2020      referral to PT for evaluation   Prn NSAIDs as needed  F/u PRN    Autumn Contreras

## 2020-09-23 LAB — GP B STREP DNA SPEC QL NAA+PROBE: NORMAL

## 2020-09-25 LAB
BASOPHILS # BLD AUTO: 30 CELLS/UL (ref 0–200)
BASOPHILS NFR BLD AUTO: 0.4 %
EOSINOPHIL # BLD AUTO: 83 CELLS/UL (ref 15–500)
EOSINOPHIL NFR BLD AUTO: 1.1 %
ERYTHROCYTE [DISTWIDTH] IN BLOOD BY AUTOMATED COUNT: 20.6 % (ref 11–15)
HCT VFR BLD AUTO: 36.5 % (ref 35–45)
HGB BLD-MCNC: 11.8 G/DL (ref 11.7–15.5)
LYMPHOCYTES # BLD AUTO: 1163 CELLS/UL (ref 850–3900)
LYMPHOCYTES NFR BLD AUTO: 15.5 %
MCH RBC QN AUTO: 29.6 PG (ref 27–33)
MCHC RBC AUTO-ENTMCNC: 32.3 G/DL (ref 32–36)
MCV RBC AUTO: 91.5 FL (ref 80–100)
MONOCYTES # BLD AUTO: 555 CELLS/UL (ref 200–950)
MONOCYTES NFR BLD AUTO: 7.4 %
NEUTROPHILS # BLD AUTO: 5670 CELLS/UL (ref 1500–7800)
NEUTROPHILS NFR BLD AUTO: 75.6 %
PLATELET # BLD AUTO: 96 THOUSAND/UL (ref 140–400)
PMV BLD REES-ECKER: 11.9 FL (ref 7.5–12.5)
RBC # BLD AUTO: 3.99 MILLION/UL (ref 3.8–5.1)
WBC # BLD AUTO: 7.5 THOUSAND/UL (ref 3.8–10.8)

## 2020-09-29 ENCOUNTER — ROUTINE PRENATAL (OUTPATIENT)
Dept: OBGYN CLINIC | Facility: CLINIC | Age: 35
End: 2020-09-29

## 2020-09-29 VITALS
DIASTOLIC BLOOD PRESSURE: 58 MMHG | TEMPERATURE: 97.5 F | BODY MASS INDEX: 28.25 KG/M2 | WEIGHT: 175 LBS | SYSTOLIC BLOOD PRESSURE: 110 MMHG

## 2020-09-29 DIAGNOSIS — O26.23 HIGH RISK PREGNANCY DUE TO RECURRENT PREGNANCY LOSS, THIRD TRIMESTER: ICD-10-CM

## 2020-09-29 DIAGNOSIS — O99.013 ANEMIA DURING PREGNANCY IN THIRD TRIMESTER: ICD-10-CM

## 2020-09-29 DIAGNOSIS — D69.6 THROMBOCYTOPENIA COMPLICATING PREGNANCY (HCC): ICD-10-CM

## 2020-09-29 DIAGNOSIS — Z34.83 PRENATAL CARE, SUBSEQUENT PREGNANCY IN THIRD TRIMESTER: Primary | ICD-10-CM

## 2020-09-29 DIAGNOSIS — Z3A.37 37 WEEKS GESTATION OF PREGNANCY: ICD-10-CM

## 2020-09-29 DIAGNOSIS — O99.119 THROMBOCYTOPENIA COMPLICATING PREGNANCY (HCC): ICD-10-CM

## 2020-09-29 DIAGNOSIS — O09.523 AMA (ADVANCED MATERNAL AGE) MULTIGRAVIDA 35+, THIRD TRIMESTER: ICD-10-CM

## 2020-09-29 PROCEDURE — PNV: Performed by: OBSTETRICS & GYNECOLOGY

## 2020-09-29 NOTE — PROGRESS NOTES
38 y o  [id number]  female at 37w0d EGA for PNV  BP : 110/58  [id number]  Discussed platelets  Will check weekly  Discussed threshold for epidural 70-75k  Patient does not plan on epidural, but did have one with her last delivery  Discussed possible role of steroids if platelets continue to drop  Patient denies contractions, leakage of fluid or vaginal bleeding  She reports good fetal movement  She has some pelvic discomfort and pressure  Follow-up in 1 week

## 2020-10-02 LAB
BASOPHILS # BLD AUTO: 29 CELLS/UL (ref 0–200)
BASOPHILS NFR BLD AUTO: 0.4 %
EOSINOPHIL # BLD AUTO: 80 CELLS/UL (ref 15–500)
EOSINOPHIL NFR BLD AUTO: 1.1 %
ERYTHROCYTE [DISTWIDTH] IN BLOOD BY AUTOMATED COUNT: 20 % (ref 11–15)
HCT VFR BLD AUTO: 35.1 % (ref 35–45)
HGB BLD-MCNC: 11.7 G/DL (ref 11.7–15.5)
LYMPHOCYTES # BLD AUTO: 1175 CELLS/UL (ref 850–3900)
LYMPHOCYTES NFR BLD AUTO: 16.1 %
MCH RBC QN AUTO: 29.9 PG (ref 27–33)
MCHC RBC AUTO-ENTMCNC: 33.3 G/DL (ref 32–36)
MCV RBC AUTO: 89.8 FL (ref 80–100)
MONOCYTES # BLD AUTO: 482 CELLS/UL (ref 200–950)
MONOCYTES NFR BLD AUTO: 6.6 %
NEUTROPHILS # BLD AUTO: 5533 CELLS/UL (ref 1500–7800)
NEUTROPHILS NFR BLD AUTO: 75.8 %
PLATELET # BLD AUTO: 105 THOUSAND/UL (ref 140–400)
PMV BLD REES-ECKER: 12.6 FL (ref 7.5–12.5)
RBC # BLD AUTO: 3.91 MILLION/UL (ref 3.8–5.1)
WBC # BLD AUTO: 7.3 THOUSAND/UL (ref 3.8–10.8)

## 2020-10-05 ENCOUNTER — ROUTINE PRENATAL (OUTPATIENT)
Dept: OBGYN CLINIC | Facility: CLINIC | Age: 35
End: 2020-10-05

## 2020-10-05 VITALS — DIASTOLIC BLOOD PRESSURE: 66 MMHG | BODY MASS INDEX: 28.02 KG/M2 | WEIGHT: 173.6 LBS | SYSTOLIC BLOOD PRESSURE: 120 MMHG

## 2020-10-05 DIAGNOSIS — O99.013 ANEMIA DURING PREGNANCY IN THIRD TRIMESTER: ICD-10-CM

## 2020-10-05 DIAGNOSIS — O99.119 THROMBOCYTOPENIA COMPLICATING PREGNANCY (HCC): ICD-10-CM

## 2020-10-05 DIAGNOSIS — O09.93 ENCOUNTER FOR SUPERVISION OF HIGH RISK PREGNANCY IN THIRD TRIMESTER, ANTEPARTUM: Primary | ICD-10-CM

## 2020-10-05 DIAGNOSIS — O09.523 AMA (ADVANCED MATERNAL AGE) MULTIGRAVIDA 35+, THIRD TRIMESTER: ICD-10-CM

## 2020-10-05 DIAGNOSIS — D69.6 THROMBOCYTOPENIA COMPLICATING PREGNANCY (HCC): ICD-10-CM

## 2020-10-05 DIAGNOSIS — Z3A.37 37 WEEKS GESTATION OF PREGNANCY: ICD-10-CM

## 2020-10-05 PROCEDURE — PNV: Performed by: OBSTETRICS & GYNECOLOGY

## 2020-10-09 LAB
BASOPHILS # BLD AUTO: 22 CELLS/UL (ref 0–200)
BASOPHILS NFR BLD AUTO: 0.3 %
EOSINOPHIL # BLD AUTO: 58 CELLS/UL (ref 15–500)
EOSINOPHIL NFR BLD AUTO: 0.8 %
ERYTHROCYTE [DISTWIDTH] IN BLOOD BY AUTOMATED COUNT: 19.4 % (ref 11–15)
HCT VFR BLD AUTO: 37.9 % (ref 35–45)
HGB BLD-MCNC: 12.5 G/DL (ref 11.7–15.5)
LYMPHOCYTES # BLD AUTO: 1321 CELLS/UL (ref 850–3900)
LYMPHOCYTES NFR BLD AUTO: 18.1 %
MCH RBC QN AUTO: 30 PG (ref 27–33)
MCHC RBC AUTO-ENTMCNC: 33 G/DL (ref 32–36)
MCV RBC AUTO: 91.1 FL (ref 80–100)
MONOCYTES # BLD AUTO: 511 CELLS/UL (ref 200–950)
MONOCYTES NFR BLD AUTO: 7 %
NEUTROPHILS # BLD AUTO: 5387 CELLS/UL (ref 1500–7800)
NEUTROPHILS NFR BLD AUTO: 73.8 %
PLATELET # BLD AUTO: 90 THOUSAND/UL (ref 140–400)
PMV BLD REES-ECKER: 11.9 FL (ref 7.5–12.5)
RBC # BLD AUTO: 4.16 MILLION/UL (ref 3.8–5.1)
WBC # BLD AUTO: 7.3 THOUSAND/UL (ref 3.8–10.8)

## 2020-10-13 ENCOUNTER — ROUTINE PRENATAL (OUTPATIENT)
Dept: OBGYN CLINIC | Facility: CLINIC | Age: 35
End: 2020-10-13

## 2020-10-13 VITALS — DIASTOLIC BLOOD PRESSURE: 62 MMHG | SYSTOLIC BLOOD PRESSURE: 114 MMHG | BODY MASS INDEX: 28.25 KG/M2 | WEIGHT: 175 LBS

## 2020-10-13 DIAGNOSIS — Z3A.39 39 WEEKS GESTATION OF PREGNANCY: ICD-10-CM

## 2020-10-13 DIAGNOSIS — D69.6 THROMBOCYTOPENIA COMPLICATING PREGNANCY (HCC): ICD-10-CM

## 2020-10-13 DIAGNOSIS — O99.013 ANEMIA DURING PREGNANCY IN THIRD TRIMESTER: ICD-10-CM

## 2020-10-13 DIAGNOSIS — O99.119 THROMBOCYTOPENIA COMPLICATING PREGNANCY (HCC): ICD-10-CM

## 2020-10-13 DIAGNOSIS — O26.23 HIGH RISK PREGNANCY DUE TO RECURRENT PREGNANCY LOSS, THIRD TRIMESTER: Primary | ICD-10-CM

## 2020-10-13 DIAGNOSIS — O09.523 AMA (ADVANCED MATERNAL AGE) MULTIGRAVIDA 35+, THIRD TRIMESTER: ICD-10-CM

## 2020-10-13 PROCEDURE — PNV: Performed by: OBSTETRICS & GYNECOLOGY

## 2020-10-16 LAB
BASOPHILS # BLD AUTO: 24 CELLS/UL (ref 0–200)
BASOPHILS NFR BLD AUTO: 0.3 %
EOSINOPHIL # BLD AUTO: 80 CELLS/UL (ref 15–500)
EOSINOPHIL NFR BLD AUTO: 1 %
ERYTHROCYTE [DISTWIDTH] IN BLOOD BY AUTOMATED COUNT: 18.4 % (ref 11–15)
HCT VFR BLD AUTO: 36.8 % (ref 35–45)
HGB BLD-MCNC: 12.3 G/DL (ref 11.7–15.5)
LYMPHOCYTES # BLD AUTO: 1272 CELLS/UL (ref 850–3900)
LYMPHOCYTES NFR BLD AUTO: 15.9 %
MCH RBC QN AUTO: 30.7 PG (ref 27–33)
MCHC RBC AUTO-ENTMCNC: 33.4 G/DL (ref 32–36)
MCV RBC AUTO: 91.8 FL (ref 80–100)
MONOCYTES # BLD AUTO: 648 CELLS/UL (ref 200–950)
MONOCYTES NFR BLD AUTO: 8.1 %
NEUTROPHILS # BLD AUTO: 5976 CELLS/UL (ref 1500–7800)
NEUTROPHILS NFR BLD AUTO: 74.7 %
PLATELET # BLD AUTO: 100 THOUSAND/UL (ref 140–400)
PMV BLD REES-ECKER: 12.4 FL (ref 7.5–12.5)
RBC # BLD AUTO: 4.01 MILLION/UL (ref 3.8–5.1)
WBC # BLD AUTO: 8 THOUSAND/UL (ref 3.8–10.8)

## 2020-10-19 ENCOUNTER — ROUTINE PRENATAL (OUTPATIENT)
Dept: OBGYN CLINIC | Facility: CLINIC | Age: 35
End: 2020-10-19

## 2020-10-19 VITALS — DIASTOLIC BLOOD PRESSURE: 62 MMHG | WEIGHT: 178.6 LBS | SYSTOLIC BLOOD PRESSURE: 118 MMHG | BODY MASS INDEX: 28.83 KG/M2

## 2020-10-19 DIAGNOSIS — O09.523 AMA (ADVANCED MATERNAL AGE) MULTIGRAVIDA 35+, THIRD TRIMESTER: ICD-10-CM

## 2020-10-19 DIAGNOSIS — O99.013 ANEMIA DURING PREGNANCY IN THIRD TRIMESTER: ICD-10-CM

## 2020-10-19 DIAGNOSIS — O26.23 HIGH RISK PREGNANCY DUE TO RECURRENT PREGNANCY LOSS, THIRD TRIMESTER: ICD-10-CM

## 2020-10-19 DIAGNOSIS — O99.119 THROMBOCYTOPENIA COMPLICATING PREGNANCY (HCC): ICD-10-CM

## 2020-10-19 DIAGNOSIS — Z3A.39 39 WEEKS GESTATION OF PREGNANCY: ICD-10-CM

## 2020-10-19 DIAGNOSIS — D69.6 THROMBOCYTOPENIA COMPLICATING PREGNANCY (HCC): ICD-10-CM

## 2020-10-19 DIAGNOSIS — Z34.83 PRENATAL CARE, SUBSEQUENT PREGNANCY IN THIRD TRIMESTER: Primary | ICD-10-CM

## 2020-10-19 PROCEDURE — PNV: Performed by: OBSTETRICS & GYNECOLOGY

## 2020-10-22 ENCOUNTER — ANESTHESIA EVENT (INPATIENT)
Dept: ANESTHESIOLOGY | Facility: HOSPITAL | Age: 35
End: 2020-10-22
Payer: COMMERCIAL

## 2020-10-22 ENCOUNTER — HOSPITAL ENCOUNTER (INPATIENT)
Facility: HOSPITAL | Age: 35
LOS: 2 days | Discharge: HOME | End: 2020-10-24
Attending: OBSTETRICS & GYNECOLOGY | Admitting: OBSTETRICS & GYNECOLOGY
Payer: COMMERCIAL

## 2020-10-22 ENCOUNTER — ANESTHESIA (INPATIENT)
Dept: ANESTHESIOLOGY | Facility: HOSPITAL | Age: 35
End: 2020-10-22
Payer: COMMERCIAL

## 2020-10-22 ENCOUNTER — HOSPITAL ENCOUNTER (OUTPATIENT)
Dept: LABOR AND DELIVERY | Facility: HOSPITAL | Age: 35
Discharge: HOME | End: 2020-10-22
Payer: COMMERCIAL

## 2020-10-22 DIAGNOSIS — Z3A.40 40 WEEKS GESTATION OF PREGNANCY: ICD-10-CM

## 2020-10-22 PROBLEM — Z34.90 ENCOUNTER FOR INDUCTION OF LABOR: Status: ACTIVE | Noted: 2020-10-22

## 2020-10-22 LAB
ABO GROUP BLD: NORMAL
BASOPHILS # BLD AUTO: 0.03 THOUSANDS/ΜL (ref 0–0.1)
BASOPHILS NFR BLD AUTO: 0 % (ref 0–1)
BLD GP AB SCN SERPL QL: NEGATIVE
EOSINOPHIL # BLD AUTO: 0.06 THOUSAND/ΜL (ref 0–0.61)
EOSINOPHIL NFR BLD AUTO: 1 % (ref 0–6)
ERYTHROCYTE [DISTWIDTH] IN BLOOD BY AUTOMATED COUNT: 17.7 % (ref 11.6–15.1)
HCT VFR BLD AUTO: 40.5 % (ref 34.8–46.1)
HGB BLD-MCNC: 13.7 G/DL (ref 11.5–15.4)
HOLD SPECIMEN: NORMAL
IMM GRANULOCYTES # BLD AUTO: 0.1 THOUSAND/UL (ref 0–0.2)
IMM GRANULOCYTES NFR BLD AUTO: 1 % (ref 0–2)
LYMPHOCYTES # BLD AUTO: 1.62 THOUSANDS/ΜL (ref 0.6–4.47)
LYMPHOCYTES NFR BLD AUTO: 18 % (ref 14–44)
MCH RBC QN AUTO: 31.4 PG (ref 26.8–34.3)
MCHC RBC AUTO-ENTMCNC: 33.8 G/DL (ref 31.4–37.4)
MCV RBC AUTO: 93 FL (ref 82–98)
MONOCYTES # BLD AUTO: 0.62 THOUSAND/ΜL (ref 0.17–1.22)
MONOCYTES NFR BLD AUTO: 7 % (ref 4–12)
NEUTROPHILS # BLD AUTO: 6.41 THOUSANDS/ΜL (ref 1.85–7.62)
NEUTS SEG NFR BLD AUTO: 73 % (ref 43–75)
NRBC BLD AUTO-RTO: 0 /100 WBCS
PLATELET # BLD AUTO: 108 THOUSANDS/UL (ref 149–390)
PMV BLD AUTO: 12.7 FL (ref 8.9–12.7)
RBC # BLD AUTO: 4.37 MILLION/UL (ref 3.81–5.12)
RH BLD: NEGATIVE
SPECIMEN EXPIRATION DATE: NORMAL
WBC # BLD AUTO: 8.84 THOUSAND/UL (ref 4.31–10.16)

## 2020-10-22 PROCEDURE — 86901 BLOOD TYPING SEROLOGIC RH(D): CPT | Performed by: OBSTETRICS & GYNECOLOGY

## 2020-10-22 PROCEDURE — 86850 RBC ANTIBODY SCREEN: CPT | Performed by: OBSTETRICS & GYNECOLOGY

## 2020-10-22 PROCEDURE — NC001 PR NO CHARGE: Performed by: OBSTETRICS & GYNECOLOGY

## 2020-10-22 PROCEDURE — 86592 SYPHILIS TEST NON-TREP QUAL: CPT | Performed by: OBSTETRICS & GYNECOLOGY

## 2020-10-22 PROCEDURE — 85025 COMPLETE CBC W/AUTO DIFF WBC: CPT | Performed by: OBSTETRICS & GYNECOLOGY

## 2020-10-22 PROCEDURE — 3E033VJ INTRODUCTION OF OTHER HORMONE INTO PERIPHERAL VEIN, PERCUTANEOUS APPROACH: ICD-10-PCS | Performed by: OBSTETRICS & GYNECOLOGY

## 2020-10-22 PROCEDURE — 4A1HXCZ MONITORING OF PRODUCTS OF CONCEPTION, CARDIAC RATE, EXTERNAL APPROACH: ICD-10-PCS | Performed by: OBSTETRICS & GYNECOLOGY

## 2020-10-22 PROCEDURE — 86900 BLOOD TYPING SEROLOGIC ABO: CPT | Performed by: OBSTETRICS & GYNECOLOGY

## 2020-10-22 RX ORDER — OXYTOCIN/RINGER'S LACTATE 30/500 ML
1-30 PLASTIC BAG, INJECTION (ML) INTRAVENOUS
Status: DISCONTINUED | OUTPATIENT
Start: 2020-10-22 | End: 2020-10-23

## 2020-10-22 RX ORDER — SODIUM CHLORIDE, SODIUM LACTATE, POTASSIUM CHLORIDE, CALCIUM CHLORIDE 600; 310; 30; 20 MG/100ML; MG/100ML; MG/100ML; MG/100ML
125 INJECTION, SOLUTION INTRAVENOUS CONTINUOUS
Status: DISCONTINUED | OUTPATIENT
Start: 2020-10-22 | End: 2020-10-23

## 2020-10-22 RX ORDER — LIDOCAINE HYDROCHLORIDE AND EPINEPHRINE 15; 5 MG/ML; UG/ML
INJECTION, SOLUTION EPIDURAL
Status: COMPLETED | OUTPATIENT
Start: 2020-10-22 | End: 2020-10-23

## 2020-10-22 RX ORDER — ONDANSETRON 2 MG/ML
4 INJECTION INTRAMUSCULAR; INTRAVENOUS EVERY 6 HOURS PRN
Status: DISCONTINUED | OUTPATIENT
Start: 2020-10-22 | End: 2020-10-23

## 2020-10-22 RX ADMIN — SODIUM CHLORIDE, SODIUM LACTATE, POTASSIUM CHLORIDE, AND CALCIUM CHLORIDE 125 ML/HR: .6; .31; .03; .02 INJECTION, SOLUTION INTRAVENOUS at 17:30

## 2020-10-22 RX ADMIN — ONDANSETRON 4 MG: 2 INJECTION INTRAMUSCULAR; INTRAVENOUS at 22:31

## 2020-10-22 RX ADMIN — Medication 2 MILLI-UNITS/MIN: at 18:21

## 2020-10-22 RX ADMIN — LIDOCAINE HYDROCHLORIDE AND EPINEPHRINE 5 ML: 15; 5 INJECTION, SOLUTION EPIDURAL at 23:41

## 2020-10-22 RX ADMIN — ROPIVACAINE HYDROCHLORIDE: 2 INJECTION, SOLUTION EPIDURAL; INFILTRATION at 23:42

## 2020-10-23 LAB
BASE EXCESS BLDCOA CALC-SCNC: -8.7 MMOL/L (ref 3–11)
BASE EXCESS BLDCOV CALC-SCNC: -4.9 MMOL/L (ref 1–9)
HCO3 BLDCOA-SCNC: 21.6 MMOL/L (ref 17.3–27.3)
HCO3 BLDCOV-SCNC: 22.5 MMOL/L (ref 12.2–28.6)
O2 CT VFR BLDCOA CALC: 16.8 ML/DL
OXYHGB MFR BLDCOA: 69 %
OXYHGB MFR BLDCOV: 71.8 %
PCO2 BLDCOA: 64.3 MM[HG] (ref 30–60)
PCO2 BLDCOV: 50.1 MM HG (ref 27–43)
PH BLDCOA: 7.14 [PH] (ref 7.23–7.43)
PH BLDCOV: 7.27 [PH] (ref 7.19–7.49)
PO2 BLDCOA: 37.1 MM HG (ref 5–25)
PO2 BLDCOV: 32.7 MM HG (ref 15–45)
RPR SER QL: NORMAL
SAO2 % BLDCOV: 17.3 ML/DL

## 2020-10-23 PROCEDURE — 82805 BLOOD GASES W/O2 SATURATION: CPT | Performed by: OBSTETRICS & GYNECOLOGY

## 2020-10-23 PROCEDURE — NC001 PR NO CHARGE: Performed by: OBSTETRICS & GYNECOLOGY

## 2020-10-23 PROCEDURE — 59400 OBSTETRICAL CARE: CPT | Performed by: OBSTETRICS & GYNECOLOGY

## 2020-10-23 RX ORDER — DIAPER,BRIEF,INFANT-TODD,DISP
1 EACH MISCELLANEOUS 4 TIMES DAILY PRN
Status: DISCONTINUED | OUTPATIENT
Start: 2020-10-23 | End: 2020-10-24 | Stop reason: HOSPADM

## 2020-10-23 RX ORDER — DOCUSATE SODIUM 100 MG/1
100 CAPSULE, LIQUID FILLED ORAL 2 TIMES DAILY
Status: DISCONTINUED | OUTPATIENT
Start: 2020-10-23 | End: 2020-10-24 | Stop reason: HOSPADM

## 2020-10-23 RX ORDER — DIPHENHYDRAMINE HCL 25 MG
25 TABLET ORAL EVERY 6 HOURS PRN
Status: DISCONTINUED | OUTPATIENT
Start: 2020-10-23 | End: 2020-10-24 | Stop reason: HOSPADM

## 2020-10-23 RX ORDER — ONDANSETRON 2 MG/ML
4 INJECTION INTRAMUSCULAR; INTRAVENOUS EVERY 8 HOURS PRN
Status: DISCONTINUED | OUTPATIENT
Start: 2020-10-23 | End: 2020-10-24 | Stop reason: HOSPADM

## 2020-10-23 RX ORDER — IBUPROFEN 600 MG/1
600 TABLET ORAL EVERY 6 HOURS PRN
Status: DISCONTINUED | OUTPATIENT
Start: 2020-10-23 | End: 2020-10-24 | Stop reason: HOSPADM

## 2020-10-23 RX ORDER — METHYLERGONOVINE MALEATE 0.2 MG/ML
INJECTION INTRAVENOUS
Status: DISCONTINUED
Start: 2020-10-23 | End: 2020-10-23 | Stop reason: WASHOUT

## 2020-10-23 RX ORDER — ACETAMINOPHEN 325 MG/1
650 TABLET ORAL EVERY 6 HOURS PRN
Status: DISCONTINUED | OUTPATIENT
Start: 2020-10-23 | End: 2020-10-24 | Stop reason: HOSPADM

## 2020-10-23 RX ORDER — SIMETHICONE 80 MG
80 TABLET,CHEWABLE ORAL 4 TIMES DAILY PRN
Status: DISCONTINUED | OUTPATIENT
Start: 2020-10-23 | End: 2020-10-24 | Stop reason: HOSPADM

## 2020-10-23 RX ORDER — CALCIUM CARBONATE 200(500)MG
1000 TABLET,CHEWABLE ORAL DAILY PRN
Status: DISCONTINUED | OUTPATIENT
Start: 2020-10-23 | End: 2020-10-24 | Stop reason: HOSPADM

## 2020-10-23 RX ORDER — OXYTOCIN/RINGER'S LACTATE 30/500 ML
250 PLASTIC BAG, INJECTION (ML) INTRAVENOUS CONTINUOUS
Status: ACTIVE | OUTPATIENT
Start: 2020-10-23 | End: 2020-10-23

## 2020-10-23 RX ORDER — CARBOPROST TROMETHAMINE 250 UG/ML
INJECTION, SOLUTION INTRAMUSCULAR
Status: DISCONTINUED
Start: 2020-10-23 | End: 2020-10-23 | Stop reason: WASHOUT

## 2020-10-23 RX ADMIN — BENZOCAINE AND LEVOMENTHOL: 200; 5 SPRAY TOPICAL at 04:01

## 2020-10-23 RX ADMIN — WITCH HAZEL 1 PAD: 500 SOLUTION RECTAL; TOPICAL at 04:01

## 2020-10-23 RX ADMIN — DOCUSATE SODIUM 100 MG: 100 CAPSULE, LIQUID FILLED ORAL at 17:30

## 2020-10-23 RX ADMIN — Medication 250 MILLI-UNITS/MIN: at 01:08

## 2020-10-23 RX ADMIN — LIDOCAINE HYDROCHLORIDE AND EPINEPHRINE 5 ML: 15; 5 INJECTION, SOLUTION EPIDURAL at 00:10

## 2020-10-23 RX ADMIN — DOCUSATE SODIUM 100 MG: 100 CAPSULE, LIQUID FILLED ORAL at 09:40

## 2020-10-23 RX ADMIN — SODIUM CHLORIDE, SODIUM LACTATE, POTASSIUM CHLORIDE, AND CALCIUM CHLORIDE 125 ML/HR: .6; .31; .03; .02 INJECTION, SOLUTION INTRAVENOUS at 00:00

## 2020-10-24 VITALS
RESPIRATION RATE: 20 BRPM | WEIGHT: 178.57 LBS | DIASTOLIC BLOOD PRESSURE: 82 MMHG | BODY MASS INDEX: 28.7 KG/M2 | HEIGHT: 66 IN | TEMPERATURE: 98.5 F | HEART RATE: 75 BPM | SYSTOLIC BLOOD PRESSURE: 129 MMHG | OXYGEN SATURATION: 97 %

## 2020-10-24 PROCEDURE — 99024 POSTOP FOLLOW-UP VISIT: CPT | Performed by: OBSTETRICS & GYNECOLOGY

## 2020-10-24 RX ORDER — IBUPROFEN 600 MG/1
600 TABLET ORAL EVERY 6 HOURS PRN
Qty: 30 TABLET | Refills: 0 | Status: SHIPPED | OUTPATIENT
Start: 2020-10-24 | End: 2022-05-16 | Stop reason: ALTCHOICE

## 2020-10-24 RX ORDER — DIAPER,BRIEF,INFANT-TODD,DISP
1 EACH MISCELLANEOUS 4 TIMES DAILY PRN
Qty: 30 G | Refills: 0 | Status: SHIPPED | OUTPATIENT
Start: 2020-10-24 | End: 2020-11-16 | Stop reason: ALTCHOICE

## 2020-10-24 RX ORDER — ACETAMINOPHEN 325 MG/1
650 TABLET ORAL EVERY 6 HOURS PRN
Qty: 1 BOTTLE | Refills: 0 | Status: SHIPPED | OUTPATIENT
Start: 2020-10-24 | End: 2020-11-16 | Stop reason: ALTCHOICE

## 2020-10-24 RX ORDER — DOCUSATE SODIUM 100 MG/1
100 CAPSULE, LIQUID FILLED ORAL 2 TIMES DAILY
Qty: 10 CAPSULE | Refills: 0 | Status: SHIPPED | OUTPATIENT
Start: 2020-10-24 | End: 2020-11-16 | Stop reason: ALTCHOICE

## 2020-10-24 RX ADMIN — IBUPROFEN 600 MG: 600 TABLET ORAL at 11:09

## 2020-10-24 RX ADMIN — DOCUSATE SODIUM 100 MG: 100 CAPSULE, LIQUID FILLED ORAL at 11:10

## 2020-10-30 LAB — PLACENTA IN STORAGE: NORMAL

## 2020-11-16 ENCOUNTER — POSTPARTUM VISIT (OUTPATIENT)
Dept: OBGYN CLINIC | Facility: CLINIC | Age: 35
End: 2020-11-16

## 2020-11-16 VITALS
DIASTOLIC BLOOD PRESSURE: 80 MMHG | TEMPERATURE: 98.2 F | WEIGHT: 155 LBS | SYSTOLIC BLOOD PRESSURE: 124 MMHG | BODY MASS INDEX: 25.02 KG/M2

## 2020-11-16 DIAGNOSIS — O99.119 THROMBOCYTOPENIA COMPLICATING PREGNANCY (HCC): ICD-10-CM

## 2020-11-16 DIAGNOSIS — D69.6 THROMBOCYTOPENIA COMPLICATING PREGNANCY (HCC): ICD-10-CM

## 2020-11-16 PROBLEM — O09.523 AMA (ADVANCED MATERNAL AGE) MULTIGRAVIDA 35+, THIRD TRIMESTER: Status: RESOLVED | Noted: 2020-04-13 | Resolved: 2020-11-16

## 2020-11-16 PROBLEM — Z3A.40 40 WEEKS GESTATION OF PREGNANCY: Status: RESOLVED | Noted: 2020-06-04 | Resolved: 2020-11-16

## 2020-11-16 PROBLEM — O26.23 HIGH RISK PREGNANCY DUE TO RECURRENT PREGNANCY LOSS, THIRD TRIMESTER: Status: RESOLVED | Noted: 2020-04-13 | Resolved: 2020-11-16

## 2020-11-16 PROCEDURE — 99024 POSTOP FOLLOW-UP VISIT: CPT | Performed by: OBSTETRICS & GYNECOLOGY

## 2020-12-01 ENCOUNTER — OFFICE VISIT (OUTPATIENT)
Dept: POSTPARTUM | Facility: CLINIC | Age: 35
End: 2020-12-01

## 2020-12-01 DIAGNOSIS — S20.122A: Primary | ICD-10-CM

## 2020-12-29 LAB
ERYTHROCYTE [DISTWIDTH] IN BLOOD BY AUTOMATED COUNT: 13 % (ref 11–15)
HCT VFR BLD AUTO: 41.4 % (ref 35–45)
HGB BLD-MCNC: 13.9 G/DL (ref 11.7–15.5)
MCH RBC QN AUTO: 31.5 PG (ref 27–33)
MCHC RBC AUTO-ENTMCNC: 33.6 G/DL (ref 32–36)
MCV RBC AUTO: 93.9 FL (ref 80–100)
PLATELET # BLD AUTO: 165 THOUSAND/UL (ref 140–400)
PMV BLD REES-ECKER: 12.1 FL (ref 7.5–12.5)
RBC # BLD AUTO: 4.41 MILLION/UL (ref 3.8–5.1)
WBC # BLD AUTO: 4.9 THOUSAND/UL (ref 3.8–10.8)

## 2021-02-08 ENCOUNTER — ANNUAL EXAM (OUTPATIENT)
Dept: OBGYN CLINIC | Facility: CLINIC | Age: 36
End: 2021-02-08
Payer: COMMERCIAL

## 2021-02-08 VITALS
HEIGHT: 67 IN | BODY MASS INDEX: 23.61 KG/M2 | WEIGHT: 150.4 LBS | DIASTOLIC BLOOD PRESSURE: 64 MMHG | SYSTOLIC BLOOD PRESSURE: 110 MMHG

## 2021-02-08 DIAGNOSIS — Z01.419 WELL FEMALE EXAM WITH ROUTINE GYNECOLOGICAL EXAM: Primary | ICD-10-CM

## 2021-02-08 PROBLEM — O99.119 THROMBOCYTOPENIA COMPLICATING PREGNANCY (HCC): Status: RESOLVED | Noted: 2020-09-19 | Resolved: 2021-02-08

## 2021-02-08 PROBLEM — Z34.90 ENCOUNTER FOR INDUCTION OF LABOR: Status: RESOLVED | Noted: 2020-10-22 | Resolved: 2021-02-08

## 2021-02-08 PROBLEM — D69.6 THROMBOCYTOPENIA COMPLICATING PREGNANCY (HCC): Status: RESOLVED | Noted: 2020-09-19 | Resolved: 2021-02-08

## 2021-02-08 PROBLEM — O26.20 HIGH RISK PREGNANCY DUE TO RECURRENT MISCARRIAGE: Status: RESOLVED | Noted: 2020-03-24 | Resolved: 2021-02-08

## 2021-02-08 PROBLEM — O99.013 ANEMIA DURING PREGNANCY IN THIRD TRIMESTER: Status: RESOLVED | Noted: 2020-07-31 | Resolved: 2021-02-08

## 2021-02-08 PROBLEM — O09.93 ENCOUNTER FOR SUPERVISION OF HIGH RISK PREGNANCY IN THIRD TRIMESTER, ANTEPARTUM: Status: RESOLVED | Noted: 2020-03-24 | Resolved: 2021-02-08

## 2021-02-08 PROCEDURE — 99395 PREV VISIT EST AGE 18-39: CPT | Performed by: OBSTETRICS & GYNECOLOGY

## 2021-02-08 PROCEDURE — 3008F BODY MASS INDEX DOCD: CPT | Performed by: OBSTETRICS & GYNECOLOGY

## 2021-02-08 PROCEDURE — 1036F TOBACCO NON-USER: CPT | Performed by: OBSTETRICS & GYNECOLOGY

## 2021-02-08 NOTE — PROGRESS NOTES
ASSESSMENT & PLAN: Jd Rollins is a 38 y o  [id number] with normal gynecologic exam     1   Routine well woman exam done today  2    Pap and HPV:Pap with HPV was not done today  Current ASCCP Guidelines reviewed  3   The patient declined STD testing  Safe sex practices have been discussed  4  The patient is sexually active  She declined contraception and options have been discussed  5  The following were reviewed in today's visit: breast self exam, family planning choices, exercise and healthy diet  6  Patient to return to office in 12 months for annual exam      All questions have been answered to her satisfaction  CC:  Annual Gynecologic Examination    HPI: Jd Rollins is a 38 y o  [id number] who presents for annual gynecologic examination  She has the following concerns:  none    Health Maintenance:    She exercises 3 days per week  She wears her seatbelt routinely  She does perform regular monthly self breast exams  She feels safe at home  She does follow a balanced diet  She does not use tobacco    Past Medical History:   Diagnosis Date    Allergy     seasonal allergy    Anxiety     Cold intolerance     Resolved 12/15/2015     Migraine     Miscarriage     x 5    Pelvic pressure in female     Resolved 12/15/2015     PONV (postoperative nausea and vomiting)     Patient requests to be pre-medicated for N/V prior to all surgeries   Also notes some light-headedness and slow to awake s/p anesthesia    Recurrent pregnancy loss     Varicella 1989    as a child    Vomiting during pregnancy     Resolved 12/15/2015     Wears glasses        Past Surgical History:   Procedure Laterality Date    CONDYLOMA EXCISION/FULGURATION  May 2007    during her first pregnancy; 2017    CYST REMOVAL      POPLITEAL SYNOVIAL CYST EXCISION      Resolved 8/2007     VA HYSTEROSCOPY,W/ENDO BX N/A 10/21/2019    Procedure: HYSTEROSCOPY; BIOPSY (Heber Curran); polypectomy;  Surgeon: Helen Mckay DO; Location: [facility];  Service: Gynecology    WISDOM TOOTH EXTRACTION         Past OB/Gyn History:  Period Cycle (Days): (No period since 2020/breastfeeding)No LMP recorded  Patient is currently sexually active: heterosexual  Birth control: TALBOT, NFP  Future fertility: is desired      Last Pap  2018 :  no abnormalities;  HPV negative    OB History    Para Term  AB Living   11 6 6 0 5 6   SAB TAB Ectopic Multiple Live Births   5 0 0 0 6      # Outcome Date GA Lbr Maldonado/2nd Weight Sex Delivery Anes PTL Lv   11 Term 10/23/20 40w3d / 00:06 3750 g (8 lb 4 3 oz) M Vag-Spont   DAVID      Name: Jaja Reef: 9  Apgar5: 9   10 SAB 2019 10w0d    SAB      9 SAB 10/16/18 4w0d    SAB      8 SAB 18 4w0d    SAB      7 SAB 18 4w0d    SAB      6 Term 14 41w2d  4082 g (9 lb) F Vag-Spont EPI N DAVID      Name: Karely Denita   5 Term 12 40w4d  3884 g (8 lb 9 oz) F Vag-Spont EPI N DAVID      Name: Natty Prieto   4 SAB 11 7w0d          3 Term 07/01/10 40w1d  3572 g (7 lb 14 oz) F Vag-Spont EPI N DAVID      Name: Grisel Shaw   2 Term 11/10/08 39w6d  3147 g (6 lb 15 oz) M Vag-Spont EPI N DAVID      Name: Miguel Form   1 Term 07 39w2d  3260 g (7 lb 3 oz) M Vag-Spont EPI N DAVID      Birth Comments: Augmented with pitocin      Name: Choco Martins      Obstetric Comments   Menarche 13        Family History:  Family History   Problem Relation Age of Onset    Colon cancer Father     Skin cancer Father     Lung cancer Maternal Grandfather     Migraines Mother    Caroline Larch / [place] Mother         1 miscarriage    Allergies Mother     Sjogren's syndrome Mother     Stroke Mother 46        TIA    Miscarriages / Stillbirths Paternal Grandmother         2 miscarriages    No Known Problems Brother     No Known Problems Daughter     No Known Problems Son     No Known Problems Maternal Grandmother     No Known Problems Paternal Grandfather     Autism Brother     No Known Problems Son     No Known Problems Daughter     No Known Problems Daughter     Breast cancer Maternal Aunt        Social History:  Social History     Socioeconomic History    Marital status: /Civil Union     Spouse name: Not on file    Number of children: Not on file    Years of education: Not on file    Highest education level: Not on file   Occupational History    Not on file   Social Needs    Financial resource strain: Not on file    Food insecurity     Worry: Not on file     Inability: Not on file   [employer] needs     Medical: Not on file     Non-medical: Not on file   Tobacco Use    Smoking status: Never Smoker    Smokeless tobacco: Never Used   Substance and Sexual Activity    Alcohol use: Not Currently     Frequency: Monthly or less     Drinks per session: 1 or 2     Binge frequency: Never     Comment: social    Drug use: Never    Sexual activity: Yes     Partners: Male     Birth control/protection: None     Comment: x 13 years George Lopez    Physical activity     Days per week: Not on file     Minutes per session: Not on file    Stress: Not on file   Relationships    Social connections     Talks on phone: Not on file     Gets together: Not on file     Attends Religion service: Not on file     Active member of club or organization: Not on file     Attends meetings of clubs or organizations: Not on file     Relationship status: Not on file    Intimate partner violence     Fear of current or ex partner: Not on file     Emotionally abused: Not on file     Physically abused: Not on file     Forced sexual activity: Not on file   Other Topics Concern    Not on file   Social History Narrative    Always uses seat belt    Feels safe at home      Presently lives with her  and kids  Patient is   Patient is currently employed - , returning to work in May  Allergies:   Allergies   Allergen Reactions    Cefaclor Hives       Medications:    Current Outpatient Medications:    ibuprofen (MOTRIN) 600 mg tablet, Take 1 tablet (600 mg total) by mouth every 6 (six) hours as needed for mild pain, Disp: 30 tablet, Rfl: 0    Prenatal Vit-Fe Fumarate-FA (PRENATAL 1 PLUS 1 PO), Take by mouth, Disp: , Rfl:     Review of Systems:  Denies fevers, chills, unintentional weight loss, excessive fatigue, chest pain, shortness of breath, abdominal pain, nausea, vomiting, urinary incontinence, urinary frequency, vaginal bleeding, vaginal discharge  All other systems negative unless otherwise stated  Physical Exam:  /64 (BP Location: Right arm, Patient Position: Sitting, Cuff Size: Standard)   Ht 5' 7" (1 702 m)   Wt 68 2 kg (150 lb 6 4 oz)   Breastfeeding Yes   BMI 23 56 kg/m²  Body mass index is 23 56 kg/m²  GEN: The patient was alert and oriented x3, pleasant well-appearing female in no acute distress  HEENT:  Unremarkable, no anterior or posterior lymphadenopathy, no thyromegaly  CV:  Regular rate and rhythm, normal S1 and S2, no murmurs  RESP:  Clear to auscultation bilaterally, no wheezes, rales or rhonchi  BREAST:  Symmetric breasts with no palpable breast masses or obvious breast lesions  She has no retractions or nipple discharge  She has no axillary abnormalities or palpable masses  GI:  Soft, nontender, non-distended  MSK: bilateral lower extremities are nontender, no edema  : Normal appearing external female genitalia, normal appearing urethral meatus  On bimanual exam, no cervical motion tenderness; uterus is smooth, mobile, nontender 6 weeks size  No tenderness or fullness in the bilateral adnexa

## 2021-02-09 DIAGNOSIS — Z23 ENCOUNTER FOR IMMUNIZATION: ICD-10-CM

## 2021-02-12 ENCOUNTER — IMMUNIZATIONS (OUTPATIENT)
Dept: FAMILY MEDICINE CLINIC | Facility: HOSPITAL | Age: 36
End: 2021-02-12

## 2021-02-12 DIAGNOSIS — Z23 ENCOUNTER FOR IMMUNIZATION: Primary | ICD-10-CM

## 2021-02-12 PROCEDURE — 91301 SARS-COV-2 / COVID-19 MRNA VACCINE (MODERNA) 100 MCG: CPT

## 2021-02-12 PROCEDURE — 0011A SARS-COV-2 / COVID-19 MRNA VACCINE (MODERNA) 100 MCG: CPT

## 2021-03-12 ENCOUNTER — IMMUNIZATIONS (OUTPATIENT)
Dept: FAMILY MEDICINE CLINIC | Facility: HOSPITAL | Age: 36
End: 2021-03-12

## 2021-03-12 DIAGNOSIS — Z23 ENCOUNTER FOR IMMUNIZATION: Primary | ICD-10-CM

## 2021-03-12 PROCEDURE — 0012A SARS-COV-2 / COVID-19 MRNA VACCINE (MODERNA) 100 MCG: CPT

## 2021-03-12 PROCEDURE — 91301 SARS-COV-2 / COVID-19 MRNA VACCINE (MODERNA) 100 MCG: CPT

## 2022-04-14 ENCOUNTER — OFFICE VISIT (OUTPATIENT)
Dept: FAMILY MEDICINE CLINIC | Facility: CLINIC | Age: 37
End: 2022-04-14
Payer: COMMERCIAL

## 2022-04-14 VITALS
SYSTOLIC BLOOD PRESSURE: 116 MMHG | HEIGHT: 67 IN | HEART RATE: 88 BPM | BODY MASS INDEX: 22.6 KG/M2 | OXYGEN SATURATION: 99 % | TEMPERATURE: 98.1 F | DIASTOLIC BLOOD PRESSURE: 70 MMHG | RESPIRATION RATE: 16 BRPM | WEIGHT: 144 LBS

## 2022-04-14 DIAGNOSIS — R42 DIZZINESS: ICD-10-CM

## 2022-04-14 DIAGNOSIS — R11.0 NAUSEA: ICD-10-CM

## 2022-04-14 DIAGNOSIS — Z00.00 ANNUAL PHYSICAL EXAM: Primary | ICD-10-CM

## 2022-04-14 DIAGNOSIS — Z3A.01 4 WEEKS GESTATION OF PREGNANCY: ICD-10-CM

## 2022-04-14 PROCEDURE — 99395 PREV VISIT EST AGE 18-39: CPT | Performed by: FAMILY MEDICINE

## 2022-04-14 RX ORDER — ONDANSETRON 4 MG/1
4 TABLET, FILM COATED ORAL EVERY 8 HOURS PRN
Qty: 20 TABLET | Refills: 0 | Status: SHIPPED | OUTPATIENT
Start: 2022-04-14

## 2022-04-14 NOTE — PROGRESS NOTES
ADULT ANNUAL [address]  [facility]    NAME: Carlos Juan  AGE: 42 y o  SEX: female  : 1985     DATE: 2022     Assessment and Plan:     Problem List Items Addressed This Visit    None     Visit Diagnoses     Annual physical exam    -  Primary    Nausea        Relevant Medications    ondansetron (ZOFRAN) 4 mg tablet    Dizziness        Relevant Medications    ondansetron (ZOFRAN) 4 mg tablet    Other Relevant Orders    Ambulatory Referral to Otolaryngology    4 weeks gestation of pregnancy          Discussed medications for migraines  Immunizations and preventive care screenings were discussed with patient today  Appropriate education was printed on patient's after visit summary  Counseling:  Alcohol/drug use: discussed moderation in alcohol intake, the recommendations for healthy alcohol use, and avoidance of illicit drug use  Dental Health: discussed importance of regular tooth brushing, flossing, and dental visits  Exercise: the importance of regular exercise/physical activity was discussed  Recommend exercise 3-5 times per week for at least 30 minutes  No follow-ups on file  Chief Complaint:     Chief Complaint   Patient presents with    Annual Exam     seasonal headaches, dizziness  History of Present Illness:     Adult Annual Physical   Patient here for a comprehensive physical exam  The patient reports she is about 4 weeks pregnant     She has 6 children - 18 months to 13years old  She is concerned about dizziness  Family history of migraines  Diet and Physical Activity  Diet/Nutrition: well balanced diet  Exercise: moderate cardiovascular exercise  Depression Screening  PHQ-2/9 Depression Screening         General Health  Sleep: sleeps well  Hearing: normal - bilateral   Vision: no vision problems  Dental: regular dental visits         /GYN Health  Last menstrual period: 3/17/22     Review of Systems:     Review of Systems   Constitutional: Negative for chills and fever  HENT: Negative for congestion and sore throat  Respiratory: Negative for chest tightness  Cardiovascular: Negative for chest pain and palpitations  Gastrointestinal: Negative for abdominal pain, constipation, diarrhea and nausea  Genitourinary: Negative for difficulty urinating  Skin: Negative  Neurological: Positive for dizziness and headaches  Psychiatric/Behavioral: Negative  Past Medical History:     Past Medical History:   Diagnosis Date    Allergy     seasonal allergy    Anxiety     Cold intolerance     Resolved 12/15/2015     Migraine     Miscarriage     x 5    Pelvic pressure in female     Resolved 12/15/2015     PONV (postoperative nausea and vomiting)     Patient requests to be pre-medicated for N/V prior to all surgeries   Also notes some light-headedness and slow to awake s/p anesthesia    Recurrent pregnancy loss     Varicella 1989    as a child    Vomiting during pregnancy     Resolved 12/15/2015     Wears glasses       Past Surgical History:     Past Surgical History:   Procedure Laterality Date    CONDYLOMA EXCISION/FULGURATION  May 2007    during her first pregnancy; 2017    CYST REMOVAL      POPLITEAL SYNOVIAL CYST EXCISION      Resolved 8/2007     HI HYSTEROSCOPY,W/ENDO BX N/A 10/21/2019    Procedure: HYSTEROSCOPY; BIOPSY (Nelson Boyle); polypectomy;  Surgeon: Chapis Jensen DO;  Location: [facility];  Service: Gynecology    WISDOM TOOTH EXTRACTION        Social History:     Social History     Socioeconomic History    Marital status: /Civil Union     Spouse name: None    Number of children: None    Years of education: None    Highest education level: None   Occupational History    None   Tobacco Use    Smoking status: Never Smoker    Smokeless tobacco: Never Used   Vaping Use    Vaping Use: Never used   Substance and Sexual Activity    Alcohol use: Not Currently Comment: social    Drug use: Never    Sexual activity: Yes     Partners: Male     Birth control/protection: None     Comment: x 13 years Jaret Fry   Other Topics Concern    None   Social History Narrative    Always uses seat belt    Feels safe at home         Consumes 2 cups of coffee per day     Social Determinants of Health     Financial Resource Strain: Not on file   Food Insecurity: Not on file   Transportation Needs: Not on file   Physical Activity: Not on file   Stress: Not on file   Social Connections: Not on file   Intimate Partner Violence: Not on file   Housing Stability: Not on file      Family History:     Family History   Problem Relation Age of Onset    Colon cancer Father     Skin cancer Father     Lung cancer Maternal Grandfather     Migraines Mother     Miscarriages / Mukesh Gold Mother         1 miscarriage    Allergies Mother     Sjogren's syndrome Mother     Stroke Mother 46        TIA    Miscarriages / Stillbirths Paternal Grandmother         2 miscarriages    No Known Problems Brother     No Known Problems Daughter     No Known Problems Son     No Known Problems Maternal Grandmother     No Known Problems Paternal Grandfather     Autism Brother     No Known Problems Son     No Known Problems Daughter     No Known Problems Daughter     Breast cancer Maternal Aunt       Current Medications:     Current Outpatient Medications   Medication Sig Dispense Refill    Prenatal Vit-Fe Fumarate-FA (PRENATAL 1 PLUS 1 PO) Take by mouth      ondansetron (ZOFRAN) 4 mg tablet Take 1 tablet (4 mg total) by mouth every 8 (eight) hours as needed for nausea or vomiting 20 tablet 0     No current facility-administered medications for this visit  Allergies:      Allergies   Allergen Reactions    Cefaclor Hives      Physical Exam:     /70 (BP Location: Right arm, Patient Position: Sitting, Cuff Size: Adult)   Pulse 88   Temp 98 1 °F (36 7 °C)   Resp 16   Ht 5' 6 5" (1 689 m)   [employer] 65 3 kg (144 lb)   LMP 03/17/2022   SpO2 99%   BMI 22 89 kg/m²     Physical Exam  Vitals and nursing note reviewed  Constitutional:       General: She is not in acute distress  Appearance: She is well-developed  HENT:      Head: Normocephalic  Left Ear: Tympanic membrane normal       Mouth/Throat:      Pharynx: No posterior oropharyngeal erythema  Eyes:      Conjunctiva/sclera: Conjunctivae normal    Neck:      Vascular: No carotid bruit  Cardiovascular:      Rate and Rhythm: Normal rate and regular rhythm  Heart sounds: No murmur heard  Pulmonary:      Effort: Pulmonary effort is normal  No respiratory distress  Breath sounds: Normal breath sounds  Abdominal:      Palpations: Abdomen is soft  Tenderness: There is no abdominal tenderness  Musculoskeletal:      Right lower leg: No edema  Left lower leg: No edema  Lymphadenopathy:      Cervical: No cervical adenopathy  Skin:     General: Skin is warm and dry  Neurological:      Mental Status: She is alert and oriented to person, place, and time     Psychiatric:         Mood and Affect: Mood normal           Jv De Souza DO    [address] [facility] [address]

## 2022-04-14 NOTE — PATIENT INSTRUCTIONS

## 2022-06-20 RX ORDER — TERBINAFINE HYDROCHLORIDE 250 MG/1
TABLET ORAL
COMMUNITY
Start: 2022-05-27

## 2022-06-20 RX ORDER — DESOGESTREL AND ETHINYL ESTRADIOL 0.15-0.03
KIT ORAL
COMMUNITY
Start: 2022-06-15

## 2022-06-20 NOTE — PRE-PROCEDURE INSTRUCTIONS
Pre-Surgery Instructions:   Medication Instructions    Apri 0 15-30 MG-MCG per tablet Hold day of surgery   ondansetron (ZOFRAN) 4 mg tablet Hold day of surgery   Prenatal Vit-Fe Fumarate-FA (PRENATAL 1 PLUS 1 PO) Stop taking 3 days prior to surgery   terbinafine (LamISIL) 250 mg tablet Hold day of surgery  NPO after midnight, meds in am with sips of water  Understands when to expect preop phone call  Remove all jewelry  Advised of visitation policy  Before your operation, you play an important role in decreasing your risk for infection by washing with special antiseptic soap  This is an effective way to reduce bacteria on the skin which may help to prevent infections at the surgical site  Please read the following directions in advance    Use antibacterial soap     2  The day before your operation follow these directions carefully to get ready  · Place clean lines (sheets) on your bed; you should sleep on clean sheets after your evening shower  · Get clean towels and washcloths ready - you need enough for 2 showers  · Set aside clean underwear, pajamas, and clothing to wear after the shower  Reminders:  · DO NOT use any other soap or body rinse on your skin during or after the antiseptic showers  · DO NOT use lotion , powder, deodorant, or perfume/aftershave of any kind on your skin after your antiseptic shower  · DO NOT shave any body parts in the 24 hours/the day before your operation  · DO NOT get the antiseptic soap in your eyes, ears, nose, mouth, or vaginal area  3      You will need to shower the night before AND the morning of your Surgery  Shower 1:  · The evening before your operation, take the fist shower  · First, shampoo your hair with regular shampoo and rinse it completely before you use the anitseptic soap  After washing and rinsing your hair, rinse your body    · Next, use a clean wash cloth to apply the antiseptic soap and wash your body from the neck down to your toes using 1/2 bottle of the antiseptic soap  You will use the other 1/2 bottle for the second shower  · Clean the area where your incision will be; later this area well for about 2 minutes  · If you ar having head or neck surgery, wash areas with the antiseptic soap  · Rinse yourself completely with running water  · Use a clean towel to dry off  · Wear clean underwear and clothing/pajamas  Shower 2:  · The Morning of your operation, take the second shower following the same steps as Shower 1 using the second 1/2 of the bottle of antiseptic soap  · Use clean cloths and towels to was and dry yourself off  · Wear clean underwear and clothing

## 2022-06-22 ENCOUNTER — ANESTHESIA EVENT (OUTPATIENT)
Dept: PERIOP | Facility: HOSPITAL | Age: 37
End: 2022-06-22
Payer: COMMERCIAL

## 2022-06-22 ENCOUNTER — OFFICE VISIT (OUTPATIENT)
Dept: AUDIOLOGY | Age: 37
End: 2022-06-22
Payer: COMMERCIAL

## 2022-06-22 DIAGNOSIS — R42 DIZZINESS: ICD-10-CM

## 2022-06-22 PROCEDURE — 92567 TYMPANOMETRY: CPT | Performed by: AUDIOLOGIST-HEARING AID FITTER

## 2022-06-22 PROCEDURE — 92537 CALORIC VSTBLR TEST W/REC: CPT | Performed by: AUDIOLOGIST-HEARING AID FITTER

## 2022-06-22 PROCEDURE — 92540 BASIC VESTIBULAR EVALUATION: CPT | Performed by: AUDIOLOGIST-HEARING AID FITTER

## 2022-06-22 NOTE — ANESTHESIA PREPROCEDURE EVALUATION
Procedure:  HYSTEROSCOPY W/ ENDOMETRIAL BIOPSY (N/A Uterus)    Relevant Problems   ANESTHESIA   (+) PONV (postoperative nausea and vomiting)      CARDIO   (+) Migraine      GYN   (+) S/P myomectomy      NEURO/PSYCH   (+) Anxiety   (+) Chronic tension headaches   (+) Migraine      Other   (+) Recurrent pregnancy loss        Physical Exam    Airway    Mallampati score: I  TM Distance: >3 FB  Neck ROM: full     Dental   No notable dental hx     Cardiovascular  Rhythm: regular, Rate: normal,     Pulmonary  Breath sounds clear to auscultation,     Other Findings        Anesthesia Plan  ASA Score- 2     Anesthesia Type- general with ASA Monitors  Additional Monitors:   Airway Plan: LMA  Comment: Scop patch,  Multimodal PONV ppx discussed  Plan Factors-Exercise tolerance (METS): >4 METS  Chart reviewed  Patient is not a current smoker  Obstructive sleep apnea risk education given perioperatively  Induction- intravenous  Postoperative Plan-     Informed Consent- Anesthetic plan and risks discussed with patient

## 2022-06-22 NOTE — PROGRESS NOTES
Videonystagmography (VNG) Evaluation    Name:  Khai Li  :  1985  Age:  42 y o  Date of Evaluation: 22     History: Dizziness  Reason for visit: Khai Li is seen today at the request of Dr Cherelle Francis for an evaluation of balance  Patient complains of intermittent dizziness described as a lightheadedness  This can be accompanied by a headache but not always  There is some sensitivity to light and sound but not always  There is nausea reported  Tympanometry:   Right: Type A - normal middle ear pressure and compliance   Left: Type A - normal middle ear pressure and compliance    Oculomotor battery:    Gaze:          Right: Normal        Left: Normal         Up: Normal        Down: Normal      Tracking: Normal    Saccades: Normal     Optokinetic: Normal    Positioning/Positionals:     [employer]:    Right: Negative      Left: Negative        Positionals:     Sitting: Normal    Supine: Normal    Head Right: Normal    Head Left: Normal    Body Right: Normal    Body Left[de-identified] Normal      Calorics:     Bithermal Caloric Irrigation: Normal    Notes: All testing within normal limits     Recommendations:  Follow up with managing physician        Esther Gambino   Clinical Audiologist

## 2022-06-23 ENCOUNTER — HOSPITAL ENCOUNTER (OUTPATIENT)
Facility: HOSPITAL | Age: 37
Setting detail: OUTPATIENT SURGERY
Discharge: HOME | End: 2022-06-23
Attending: OBSTETRICS & GYNECOLOGY | Admitting: OBSTETRICS & GYNECOLOGY
Payer: COMMERCIAL

## 2022-06-23 ENCOUNTER — ANESTHESIA (OUTPATIENT)
Dept: PERIOP | Facility: HOSPITAL | Age: 37
End: 2022-06-23
Payer: COMMERCIAL

## 2022-06-23 VITALS
HEIGHT: 66 IN | RESPIRATION RATE: 18 BRPM | SYSTOLIC BLOOD PRESSURE: 105 MMHG | WEIGHT: 144.4 LBS | HEART RATE: 72 BPM | OXYGEN SATURATION: 99 % | DIASTOLIC BLOOD PRESSURE: 63 MMHG | TEMPERATURE: 97.4 F | BODY MASS INDEX: 23.21 KG/M2

## 2022-06-23 DIAGNOSIS — N71.1 CHRONIC ENDOMETRITIS: Primary | ICD-10-CM

## 2022-06-23 DIAGNOSIS — N92.0 EXCESSIVE AND FREQUENT MENSTRUATION WITH REGULAR CYCLE: ICD-10-CM

## 2022-06-23 DIAGNOSIS — N96 RECURRENT PREGNANCY LOSS: ICD-10-CM

## 2022-06-23 PROBLEM — Z98.890 STATUS POST HYSTEROSCOPY: Status: ACTIVE | Noted: 2022-06-23

## 2022-06-23 LAB
EXT PREGNANCY TEST URINE: NEGATIVE
EXT. CONTROL: NORMAL

## 2022-06-23 PROCEDURE — 88305 TISSUE EXAM BY PATHOLOGIST: CPT | Performed by: PATHOLOGY

## 2022-06-23 PROCEDURE — 81025 URINE PREGNANCY TEST: CPT | Performed by: OBSTETRICS & GYNECOLOGY

## 2022-06-23 RX ORDER — ACETAMINOPHEN 325 MG/1
975 TABLET ORAL ONCE
Status: COMPLETED | OUTPATIENT
Start: 2022-06-23 | End: 2022-06-23

## 2022-06-23 RX ORDER — PROMETHAZINE HYDROCHLORIDE 25 MG/ML
6.25 INJECTION, SOLUTION INTRAMUSCULAR; INTRAVENOUS ONCE AS NEEDED
Status: DISCONTINUED | OUTPATIENT
Start: 2022-06-23 | End: 2022-06-23 | Stop reason: HOSPADM

## 2022-06-23 RX ORDER — FENTANYL CITRATE 50 UG/ML
INJECTION, SOLUTION INTRAMUSCULAR; INTRAVENOUS AS NEEDED
Status: DISCONTINUED | OUTPATIENT
Start: 2022-06-23 | End: 2022-06-23

## 2022-06-23 RX ORDER — ONDANSETRON 2 MG/ML
4 INJECTION INTRAMUSCULAR; INTRAVENOUS ONCE AS NEEDED
Status: DISCONTINUED | OUTPATIENT
Start: 2022-06-23 | End: 2022-06-23 | Stop reason: HOSPADM

## 2022-06-23 RX ORDER — PROPOFOL 10 MG/ML
INJECTION, EMULSION INTRAVENOUS AS NEEDED
Status: DISCONTINUED | OUTPATIENT
Start: 2022-06-23 | End: 2022-06-23

## 2022-06-23 RX ORDER — MAGNESIUM HYDROXIDE 1200 MG/15ML
LIQUID ORAL AS NEEDED
Status: DISCONTINUED | OUTPATIENT
Start: 2022-06-23 | End: 2022-06-23 | Stop reason: HOSPADM

## 2022-06-23 RX ORDER — SODIUM CHLORIDE 9 MG/ML
INJECTION, SOLUTION INTRAVENOUS AS NEEDED
Status: DISCONTINUED | OUTPATIENT
Start: 2022-06-23 | End: 2022-06-23 | Stop reason: HOSPADM

## 2022-06-23 RX ORDER — SCOLOPAMINE TRANSDERMAL SYSTEM 1 MG/1
1 PATCH, EXTENDED RELEASE TRANSDERMAL
Status: DISCONTINUED | OUTPATIENT
Start: 2022-06-23 | End: 2022-06-23

## 2022-06-23 RX ORDER — FENTANYL CITRATE/PF 50 MCG/ML
50 SYRINGE (ML) INJECTION
Status: DISCONTINUED | OUTPATIENT
Start: 2022-06-23 | End: 2022-06-23 | Stop reason: HOSPADM

## 2022-06-23 RX ORDER — ONDANSETRON 2 MG/ML
4 INJECTION INTRAMUSCULAR; INTRAVENOUS EVERY 6 HOURS PRN
Status: DISCONTINUED | OUTPATIENT
Start: 2022-06-23 | End: 2022-06-23 | Stop reason: HOSPADM

## 2022-06-23 RX ORDER — IBUPROFEN 600 MG/1
600 TABLET ORAL EVERY 6 HOURS PRN
Status: DISCONTINUED | OUTPATIENT
Start: 2022-06-23 | End: 2022-06-23 | Stop reason: HOSPADM

## 2022-06-23 RX ORDER — MIDAZOLAM HYDROCHLORIDE 2 MG/2ML
INJECTION, SOLUTION INTRAMUSCULAR; INTRAVENOUS AS NEEDED
Status: DISCONTINUED | OUTPATIENT
Start: 2022-06-23 | End: 2022-06-23

## 2022-06-23 RX ORDER — KETOROLAC TROMETHAMINE 30 MG/ML
INJECTION, SOLUTION INTRAMUSCULAR; INTRAVENOUS AS NEEDED
Status: DISCONTINUED | OUTPATIENT
Start: 2022-06-23 | End: 2022-06-23

## 2022-06-23 RX ORDER — MEPERIDINE HYDROCHLORIDE 25 MG/ML
12.5 INJECTION INTRAMUSCULAR; INTRAVENOUS; SUBCUTANEOUS ONCE AS NEEDED
Status: DISCONTINUED | OUTPATIENT
Start: 2022-06-23 | End: 2022-06-23 | Stop reason: HOSPADM

## 2022-06-23 RX ORDER — ACETAMINOPHEN 325 MG/1
975 TABLET ORAL EVERY 6 HOURS PRN
Status: DISCONTINUED | OUTPATIENT
Start: 2022-06-23 | End: 2022-06-23 | Stop reason: HOSPADM

## 2022-06-23 RX ORDER — HYDROMORPHONE HCL/PF 1 MG/ML
0.5 SYRINGE (ML) INJECTION
Status: DISCONTINUED | OUTPATIENT
Start: 2022-06-23 | End: 2022-06-23 | Stop reason: HOSPADM

## 2022-06-23 RX ORDER — DOXYCYCLINE 100 MG/1
100 TABLET ORAL 2 TIMES DAILY
Qty: 20 TABLET | Refills: 0 | Status: SHIPPED | OUTPATIENT
Start: 2022-06-23 | End: 2022-07-03

## 2022-06-23 RX ORDER — SODIUM CHLORIDE 9 MG/ML
125 INJECTION, SOLUTION INTRAVENOUS CONTINUOUS
Status: DISCONTINUED | OUTPATIENT
Start: 2022-06-23 | End: 2022-06-23 | Stop reason: HOSPADM

## 2022-06-23 RX ORDER — DOXYCYCLINE 100 MG/1
100 CAPSULE ORAL 2 TIMES DAILY
Qty: 10 CAPSULE | Refills: 0 | Status: SHIPPED | OUTPATIENT
Start: 2022-06-23 | End: 2022-06-23

## 2022-06-23 RX ORDER — ONDANSETRON 2 MG/ML
INJECTION INTRAMUSCULAR; INTRAVENOUS AS NEEDED
Status: DISCONTINUED | OUTPATIENT
Start: 2022-06-23 | End: 2022-06-23

## 2022-06-23 RX ORDER — DEXAMETHASONE SODIUM PHOSPHATE 10 MG/ML
INJECTION, SOLUTION INTRAMUSCULAR; INTRAVENOUS AS NEEDED
Status: DISCONTINUED | OUTPATIENT
Start: 2022-06-23 | End: 2022-06-23

## 2022-06-23 RX ADMIN — PROPOFOL 150 MG: 10 INJECTION, EMULSION INTRAVENOUS at 07:32

## 2022-06-23 RX ADMIN — ONDANSETRON 4 MG: 2 INJECTION INTRAMUSCULAR; INTRAVENOUS at 07:38

## 2022-06-23 RX ADMIN — MIDAZOLAM 2 MG: 1 INJECTION INTRAMUSCULAR; INTRAVENOUS at 07:25

## 2022-06-23 RX ADMIN — KETOROLAC TROMETHAMINE 30 MG: 30 INJECTION, SOLUTION INTRAMUSCULAR; INTRAVENOUS at 08:00

## 2022-06-23 RX ADMIN — SODIUM CHLORIDE 125 ML/HR: 0.9 INJECTION, SOLUTION INTRAVENOUS at 08:32

## 2022-06-23 RX ADMIN — SODIUM CHLORIDE 125 ML/HR: 0.9 INJECTION, SOLUTION INTRAVENOUS at 06:34

## 2022-06-23 RX ADMIN — FENTANYL CITRATE 50 MCG: 50 INJECTION INTRAMUSCULAR; INTRAVENOUS at 08:00

## 2022-06-23 RX ADMIN — ACETAMINOPHEN 325MG 975 MG: 325 TABLET ORAL at 06:15

## 2022-06-23 RX ADMIN — FENTANYL CITRATE 50 MCG: 50 INJECTION INTRAMUSCULAR; INTRAVENOUS at 07:38

## 2022-06-23 RX ADMIN — SCOPALAMINE 1 PATCH: 1 PATCH, EXTENDED RELEASE TRANSDERMAL at 06:17

## 2022-06-23 RX ADMIN — DEXAMETHASONE SODIUM PHOSPHATE 10 MG: 10 INJECTION INTRAMUSCULAR; INTRAVENOUS at 07:38

## 2022-06-23 NOTE — OP NOTE
OPERATIVE REPORT  PATIENT NAME: Stan Saleem    :  1985  MRN: [id number]  Pt Location: [facility] OR ROOM 04    SURGERY DATE: 2022    Surgeon(s) and Role:     * Claude Ratel, DO - Primary     * Crow Moreau MD - Assisting    Preop Diagnosis:  Excessive and frequent menstruation with regular cycle [N92 0]  Recurrent pregnancy loss [N96]    Post-Op Diagnosis Codes:     * Excessive and frequent menstruation with regular cycle [N92 0]     * Recurrent pregnancy loss [N96]    Procedure(s) (LRB):  HYSTEROSCOPY W/ ENDOMETRIAL BIOPSY (N/A)  POLYPECTOMY (N/A)    Specimen(s):  ID Type Source Tests Collected by Time Destination   1 : endometrial polyp and biopsy  Tissue Endometrium TISSUE EXAM Claude Ratel, DO 2022 [id number]        Estimated Blood Loss:   Minimal    IVF: 800 ml    Urine: 10 ml    Hysteroscopic Fluid deficit: 335 ml    Drains:  * No LDAs found *    Anesthesia Type:   Choice    Operative Indications:  Excessive and frequent menstruation with regular cycle [N92 0]  Recurrent pregnancy loss [N96]      Operative Findings: The uterus sounded to 9 centimeters retroverted using an endometrial biopsy Pipelle  Through the hysteroscope both ostia were well visualized  The left ostia had a polyp within it  There was micro polyps throughout the entire uterine cavity and also a small polyp in the left uterine corpus  Otherwise the uterine cavity was normal     Complications:   None    Procedure and Technique:  The patient was identified in the preoperative holding area and taken to the operative suite she was placed in supine position  She then underwent general LMA anesthesia and was placed in the dorsal lithotomy position and prepped and draped in normal sterile fashion  A time-out was held according to protocol and was deemed we could begin the procedure  A Cortes catheter was used to empty the bladder  The cervix was visualized using a De La Rosa retractor posteriorly and anteriorly    The anterior lip the cervix was grasped with single-tooth tenaculum  The uterus sounded to 9 centimeters retroverted using an endometrial biopsy Pipelle  The cervix was progressively dilated to a number 21 using Nagi dilators  The hysteroscope was then inserted and a careful survey of the cavity revealed the findings as noted above  Using the hysteroscopic graspers all polyps were removed and sent to pathology for further evaluation  Biopsies were also taken of the lining  Given the extent of the micro polyps a gentle curettage was performed and all tissue sent to pathology for further evaluation  The hysteroscope was then reinserted to ensure that all polyps had been removed  At this point normal anatomy had been restored and this concluded the procedure  Hemostasis was observed  All instrumentation was then removed from the patient's vagina  The patient was returned to supine position and awakened from anesthesia and taken to the recovery room were she was noted to be in stable condition  It should be noted at the end of the procedure all sponge lap needle instrument counts were correct x2 per the RN     I was present for the entire procedure    Patient Disposition:  PACU       SIGNATURE: Edith Painter DO  DATE: June 23, 2022  TIME: 8:08 AM

## 2022-06-23 NOTE — ADDENDUM NOTE
Addendum  created 06/23/22 0844 by Maria De Jesus Thornton CRNA    Flowsheet accepted, Intraprocedure Event deleted, Intraprocedure Event edited

## 2022-06-23 NOTE — INTERVAL H&P NOTE
H&P reviewed  After examining the patient I find no changes in the patients condition since the H&P had been written      Vitals:    06/23/22 0613   BP: 109/64   Pulse: 83   Resp: 16   Temp: 98 9 °F (37 2 °C)   SpO2: 97%

## 2022-06-23 NOTE — ANESTHESIA POSTPROCEDURE EVALUATION
Post-Op Assessment Note    CV Status:  Stable  Pain Score: 1    Pain management: adequate     Mental Status:  Alert and awake   Hydration Status:  Euvolemic   PONV Controlled:  Controlled   Airway Patency:  Patent      Post Op Vitals Reviewed: Yes      Staff: Anesthesiologist         No complications documented      /58 (06/23/22 0828)    Temp      Pulse 66 (06/23/22 0828)   Resp 18 (06/23/22 0828)    SpO2 100 % (06/23/22 0828)

## 2022-06-24 ENCOUNTER — TELEPHONE (OUTPATIENT)
Dept: NEUROLOGY | Facility: CLINIC | Age: 37
End: 2022-06-24

## 2022-06-24 NOTE — TELEPHONE ENCOUNTER
Patient calling to schedule new patient appointment for dizziness  No testing done  Triage intake sent

## 2022-10-31 ENCOUNTER — TELEPHONE (OUTPATIENT)
Dept: NEUROLOGY | Facility: CLINIC | Age: 37
End: 2022-10-31

## 2022-10-31 NOTE — TELEPHONE ENCOUNTER
THE [facility] to confirm patient's 11/3/2022 @ 1 pm appointment with Dr Lucrecia Reid at the [facility]  Call back number given [phone]

## 2022-11-03 ENCOUNTER — CONSULT (OUTPATIENT)
Dept: NEUROLOGY | Facility: CLINIC | Age: 37
End: 2022-11-03

## 2022-11-03 VITALS
SYSTOLIC BLOOD PRESSURE: 104 MMHG | DIASTOLIC BLOOD PRESSURE: 60 MMHG | OXYGEN SATURATION: 98 % | HEART RATE: 76 BPM | WEIGHT: 146.2 LBS | BODY MASS INDEX: 23.5 KG/M2 | HEIGHT: 66 IN

## 2022-11-03 DIAGNOSIS — R42 DIZZINESS: ICD-10-CM

## 2022-11-03 DIAGNOSIS — G43.909 MIGRAINE: Primary | ICD-10-CM

## 2022-11-03 RX ORDER — PROCHLORPERAZINE MALEATE 10 MG
10 TABLET ORAL EVERY 6 HOURS PRN
Qty: 20 TABLET | Refills: 3 | Status: SHIPPED | OUTPATIENT
Start: 2022-11-03

## 2022-11-03 NOTE — PROGRESS NOTES
Patient ID: Fatimah Lima is a 42 y o  female  Assessment/Plan:    Migraine  Patient is a very pleasant 66-year-old female with history of recurrent pregnancy loss and endometritis who presents for evaluation of headaches and dizziness  Reports frontal headache associated with photophobia and phonophobia  Headaches improve with ibuprofen  She has noted a more intense headache when endometritis goes untreated  Blood work as well as MRI inconclusive for etiology  ENT workup negative  Physical exam normal     At this time, possible that patient may be having vestibular migraines  At this time we are limited with treatment given that patient is actively trying to conceive  We will do an MRA brain and VS of the carotids to look for fibromuscular dysplasia  Can continue Excedrin migraine as well as Tylenol and Compazine for headaches  Advised to continue magnesium and riboflavin  Once done with childbearing can consider other options  Diagnoses and all orders for this visit:    Migraine  -     VAS carotid complete study; Future  -     prochlorperazine (COMPAZINE) 10 mg tablet; Take 1 tablet (10 mg total) by mouth every 6 (six) hours as needed for nausea or vomiting    Dizziness  -     Ambulatory referral to Neurology  -     MRI angiogram head without contrast; Future  -     VAS carotid complete study; Future         Subjective:    HPI  Patient is a very pleasant 66-year-old female with history of recurrent pregnancy loss and endometritis who presents for evaluation of headaches and dizziness  She states that she has been having headaches since elementary school however has never had workup for then      Headaches    Migraines:  1-2 a month since antibiotics  Located in frontal   Photophobia, phonophobia   Nausea  No vomiting   No visual symptoms   Takes motrin which helps   Exedrin migraine if worsens   Aleve - not so much helpful   Worse with fast head movements and driving    Dizziness:  Sensation of motion  Morning sickness  Nausea  No spinning, no swaying, More lightheadedness  No blurry vision  Disorientation,   Slightly foggy   Improves with motrin and zofran  Always associated with headache     Patient states that in 2019 her headache started to have more migrainous features  Around this time she was also diagnosed with endometritis  When she was treated with antibiotics for this she noted resolution of the intense migraines and the dizziness  She reports that the dizziness with the most part has gone away  For her dizziness she was evaluated by audiology and ENT  Hearing exam was normal  MRI IAC without contrast normal   GRETCHEN positive  Workup for lupus negative  ENT recommended evaluation by Neurology  In terms of her past medical history patient has had a total of 6 1st trimester pregnancy loss  Has had workup for this without any clear etiology  Her 1st loss was in 2011  She currently does have 6 living children and is trying for 1 more  She states that as a kid, headaches were more sporadic  In her early 25s after she got pregnant for the 1st time around the age of 19 was when they became more prominent  Mother and son with history of migraines  Patient follows with ophthalmology she was last seen in December of last year  Patient taking riboflavin and magnesium every other day for around 6 months  She does note a difference in her migraines      The following portions of the patient's history were reviewed and updated as appropriate: allergies, current medications, past family history, past medical history, past social history, past surgical history and problem list and ros  Objective:    Blood pressure 104/60, pulse 76, height 5' 6" (1 676 m), weight 66 3 kg (146 lb 3 2 oz), SpO2 98 %, currently breastfeeding  Physical Exam  Eyes:      General: Lids are normal       Extraocular Movements: Extraocular movements intact        Pupils: Pupils are equal, round, and reactive to light  Neurological:      Deep Tendon Reflexes: Strength normal       Reflex Scores:       Bicep reflexes are 2+ on the right side and 2+ on the left side  Brachioradialis reflexes are 2+ on the right side and 2+ on the left side  Patellar reflexes are 2+ on the right side and 2+ on the left side  Achilles reflexes are 2+ on the right side and 2+ on the left side  Neurological Exam  Mental Status  Awake, alert and oriented to person, place and time  Cranial Nerves  CN II: Visual fields full to confrontation  CN III, IV, VI: Extraocular movements intact bilaterally  Normal lids and orbits bilaterally  Pupils equal round and reactive to light bilaterally  CN V: Facial sensation is normal   CN VII: Full and symmetric facial movement  CN VIII: Hearing is normal   CN IX, X: Palate elevates symmetrically  CN XI: Shoulder shrug strength is normal   CN XII: Tongue midline without atrophy or fasciculations  Motor   Strength is 5/5 throughout all four extremities  Sensory  Light touch is normal in upper and lower extremities  Temperature is normal in upper and lower extremities  Vibration is normal in upper and lower extremities  Reflexes                                            Right                      Left  Brachioradialis                    2+                         2+  Biceps                                 2+                         2+  Patellar                                2+                         2+  Achilles                                2+                         2+    Coordination  Right: Finger-to-nose normal Left: Finger-to-nose normal     Gait  Casual gait is normal including stance, stride, and arm swing  ROS:    Review of Systems   Constitutional: Negative  Negative for appetite change and fever  HENT: Negative  Negative for hearing loss, tinnitus, trouble swallowing and voice change  Eyes: Negative    Negative for photophobia, pain and visual disturbance  Respiratory: Negative  Negative for shortness of breath  Cardiovascular: Negative  Negative for palpitations  Gastrointestinal: Negative for nausea and vomiting  Endocrine: Negative  Negative for cold intolerance  Genitourinary: Negative  Negative for dysuria, frequency and urgency  Musculoskeletal: Negative  Negative for gait problem, myalgias and neck pain  Skin: Negative  Negative for rash  Allergic/Immunologic: Negative  Neurological: Positive for dizziness and headaches  Negative for tremors, seizures, syncope, facial asymmetry, speech difficulty, weakness, light-headedness and numbness  Hematological: Negative  Does not bruise/bleed easily  Psychiatric/Behavioral: Negative  Negative for confusion, hallucinations and sleep disturbance

## 2022-11-03 NOTE — ASSESSMENT & PLAN NOTE
Patient is a very pleasant 75-year-old female with history of recurrent pregnancy loss and endometritis who presents for evaluation of headaches and dizziness  Reports frontal headache associated with photophobia and phonophobia  Headaches improve with ibuprofen  She has noted a more intense headache when endometritis goes untreated  Blood work as well as MRI inconclusive for etiology  ENT workup negative  Physical exam normal     At this time, possible that patient may be having vestibular migraines  At this time we are limited with treatment given that patient is actively trying to conceive  We will do an MRA brain and VS of the carotids to look for fibromuscular dysplasia  Can continue Excedrin migraine as well as Tylenol and Compazine for headaches  Advised to continue magnesium and riboflavin  Once done with childbearing can consider other options

## 2022-11-23 ENCOUNTER — HOSPITAL ENCOUNTER (OUTPATIENT)
Dept: NON INVASIVE DIAGNOSTICS | Facility: HOSPITAL | Age: 37
Discharge: HOME | End: 2022-11-23
Attending: PSYCHIATRY & NEUROLOGY

## 2022-11-23 ENCOUNTER — HOSPITAL ENCOUNTER (OUTPATIENT)
Dept: MRI IMAGING | Facility: HOSPITAL | Age: 37
Discharge: HOME | End: 2022-11-23
Attending: PSYCHIATRY & NEUROLOGY

## 2022-11-23 DIAGNOSIS — R42 DIZZINESS: ICD-10-CM

## 2022-11-23 DIAGNOSIS — G43.909 MIGRAINE: ICD-10-CM

## 2023-01-10 ENCOUNTER — TELEPHONE (OUTPATIENT)
Dept: OBGYN CLINIC | Facility: CLINIC | Age: 38
End: 2023-01-10

## 2023-01-10 DIAGNOSIS — Z36.9 FIRST TRIMESTER SCREENING: Primary | ICD-10-CM

## 2023-01-19 ENCOUNTER — INITIAL PRENATAL (OUTPATIENT)
Dept: OBGYN CLINIC | Facility: CLINIC | Age: 38
End: 2023-01-19

## 2023-01-19 VITALS
DIASTOLIC BLOOD PRESSURE: 68 MMHG | WEIGHT: 153.4 LBS | SYSTOLIC BLOOD PRESSURE: 108 MMHG | BODY MASS INDEX: 24.65 KG/M2 | HEIGHT: 66 IN

## 2023-01-19 DIAGNOSIS — O09.521 MULTIGRAVIDA OF ADVANCED MATERNAL AGE IN FIRST TRIMESTER: Primary | ICD-10-CM

## 2023-01-19 DIAGNOSIS — Z3A.10 10 WEEKS GESTATION OF PREGNANCY: ICD-10-CM

## 2023-01-19 NOTE — PROGRESS NOTES
OB INTAKE INTERVIEW  Pt presents for OB intake  Transfer from [facility]  Records scanned into Media  Patient conceived spontaneously  HX of reoccurring pregnancy loss  LEVAR 23    Plan:  - Prenatal labs ordered  - Referral given for MFM   -NT scheduled for   - Reviewed Genetic testing options   -Declined SMA and CF screening   - Patient to call for concerns  - RTO 2-3 weeks for OB F/U visit and PAP/Cultures       [id number]  OB History    Para Term  AB Living   14 6 6   7 6   SAB IAB Ectopic Multiple Live Births   7     0 6      # Outcome Date GA Lbr Maldonado/2nd Weight Sex Delivery Anes PTL Lv   14 Current            13 SAB 2022 4w0d          12 SAB 2021 8w0d          11 Term 10/23/20 40w3d / 00:06 3750 g (8 lb 4 3 oz) M Vag-Spont EPI  DAVID   10 SAB 2019 10w0d    SAB      9 SAB 10/16/18 4w0d    SAB      8 SAB 18 4w0d    SAB      7 SAB 18 4w0d    SAB      6 Term 14 41w2d  4082 g (9 lb) F Vag-Spont EPI N DAVID   5 Term 12 40w4d  3884 g (8 lb 9 oz) F Vag-Spont EPI N DAVID   4 SAB 11 7w0d          3 Term 07/01/10 40w1d  3572 g (7 lb 14 oz) F Vag-Spont EPI N DAVID   2 Term 11/10/08 39w6d  3147 g (6 lb 15 oz) M Vag-Spont EPI N DAVID   1 Term 07 39w2d  3260 g (7 lb 3 oz) M Vag-Spont EPI N DAVID      Birth Comments: Augmented with pitocin      Obstetric Comments   Menarche 13      Hx of  delivery prior to 36 weeks 6 days: No  Last Menstrual Period:    Patient's last menstrual period was 11/10/2022 (exact date)  Ultrasound date: 22  5 weeks 1 days     Estimated Date of Delivery: 23    Current Issues:  Constipation :   No  Headaches :   No  Cramping:  No  Spotting :   No      Interview education  • [facility] Pregnancy Essentials reviewed and discussed   • Baby and [address] Handout  • [facility] MFM Handout  • Discussed genetic testing  • Prenatal lab work: Scripts printed and given to pt     • Influenza vaccine given today: No  • Discussed Tdap vaccine  Immunizations:   Immunization History   Administered Date(s) Administered   • COVID-19 MODERNA VACC 0 5 ML IM 2021, 2021   • INFLUENZA 2019   • Rho (D) Immune Globulin 08/10/2018, 2018, 2019, 2020   • Tdap 2014, 2020       Prior Pregnancy Delivery Complications   History of  delivery or PPROM: No  History of Shoulder Dystocia: No   History of vacuum or forceps delivery: No   History of 3rd/4th degree laceration: No   History of  section: No    Diabetes              Pregestational DM: No                hx of GDM: No              BMI >35: No              first degree relative with type 2 diabetes: No              hx of PCOS: No              current metformin use: No              prior hx of LGA/macrosomia: No               Hypertension              Hx of chronic HTN: No              hx of gestational HTN: No              hx of preeclampsia, eclampsia, or HELLP syndrome: No              Family h/o preeclampsia: No              Age 28 or older: Yes              Multifetal gestation: No  Type 1 or Type 2 DM: No  Renal Disease: No  Autoimmune disease (systemic lupus erythematosus, antiphospholipid antibody syndrome): No  Nulliparity: No  Obesity (BMI over 30): No  More than 10 year pregnancy interval: No  Previous IUGR, low birthweight or small for gestational age: No     Immunizations:              influenza vaccine: Declined               discussed Tdap vaccine administration at 27-28 weeks   Covid Vaccination: Vaccinated with one booster     Dental visit with last 6 months - Yes  PHQ-2/9 score: 0       MyChart activated (not 1518 years of age)?: Yes    The patient was oriented to our practice and all questions were answered    Interviewed by: Evon Goldberg, RN 23

## 2023-01-24 LAB
EXTERNAL HEPATITIS B SURFACE ANTIGEN: NORMAL
EXTERNAL HEPATITIS B SURFACE ANTIGEN: NORMAL
EXTERNAL RUBELLA IGG QUANTITATION: NORMAL

## 2023-01-25 LAB
ABO GROUP BLD: NORMAL
BASOPHILS # BLD AUTO: 42 CELLS/UL (ref 0–200)
BASOPHILS NFR BLD AUTO: 0.6 %
BLD GP AB SCN SERPL QL: NORMAL
EOSINOPHIL # BLD AUTO: 203 CELLS/UL (ref 15–500)
EOSINOPHIL NFR BLD AUTO: 2.9 %
ERYTHROCYTE [DISTWIDTH] IN BLOOD BY AUTOMATED COUNT: 12.8 % (ref 11–15)
HBV SURFACE AG SERPL QL IA: NORMAL
HCT VFR BLD AUTO: 36.2 % (ref 35–45)
HCV AB S/CO SERPL IA: 0.04
HCV AB SERPL QL IA: NORMAL
HGB BLD-MCNC: 12.3 G/DL (ref 11.7–15.5)
HIV 1+2 AB+HIV1 P24 AG SERPL QL IA: NORMAL
LYMPHOCYTES # BLD AUTO: 1505 CELLS/UL (ref 850–3900)
LYMPHOCYTES NFR BLD AUTO: 21.5 %
MCH RBC QN AUTO: 29.7 PG (ref 27–33)
MCHC RBC AUTO-ENTMCNC: 34 G/DL (ref 32–36)
MCV RBC AUTO: 87.4 FL (ref 80–100)
MONOCYTES # BLD AUTO: 420 CELLS/UL (ref 200–950)
MONOCYTES NFR BLD AUTO: 6 %
NEUTROPHILS # BLD AUTO: 4830 CELLS/UL (ref 1500–7800)
NEUTROPHILS NFR BLD AUTO: 69 %
PLATELET # BLD AUTO: 184 THOUSAND/UL (ref 140–400)
PMV BLD REES-ECKER: 12.2 FL (ref 7.5–12.5)
RBC # BLD AUTO: 4.14 MILLION/UL (ref 3.8–5.1)
RH BLD: NORMAL
RPR SER QL: NORMAL
RUBV IGG SERPL IA-ACNC: 2.15 INDEX
VZV IGG SER IA-ACNC: 156.4 INDEX
WBC # BLD AUTO: 7 THOUSAND/UL (ref 3.8–10.8)

## 2023-01-31 ENCOUNTER — ROUTINE PRENATAL (OUTPATIENT)
Dept: OBGYN CLINIC | Facility: CLINIC | Age: 38
End: 2023-01-31

## 2023-01-31 VITALS — SYSTOLIC BLOOD PRESSURE: 100 MMHG | DIASTOLIC BLOOD PRESSURE: 64 MMHG | WEIGHT: 156 LBS | BODY MASS INDEX: 25.18 KG/M2

## 2023-01-31 DIAGNOSIS — O09.521 MULTIGRAVIDA OF ADVANCED MATERNAL AGE IN FIRST TRIMESTER: ICD-10-CM

## 2023-01-31 DIAGNOSIS — N96 RECURRENT PREGNANCY LOSS: ICD-10-CM

## 2023-01-31 DIAGNOSIS — O21.9 NAUSEA/VOMITING IN PREGNANCY: ICD-10-CM

## 2023-01-31 DIAGNOSIS — Z3A.11 11 WEEKS GESTATION OF PREGNANCY: Primary | ICD-10-CM

## 2023-01-31 RX ORDER — ONDANSETRON 4 MG/1
4 TABLET, FILM COATED ORAL EVERY 8 HOURS PRN
Qty: 20 TABLET | Refills: 1 | Status: SHIPPED | OUTPATIENT
Start: 2023-01-31

## 2023-01-31 NOTE — PATIENT INSTRUCTIONS
Luiz Damon,     Part of routine screening in pregnancy is a maternal AFP level, which is a blood test that should be collected between 16-18 weeks of pregnancy, but can be collected up to 21weeks 6days  We have placed an order for this lab in your chart  If you can use the [facility]'s lab, you can report to the lab and they will be able to access the order  If you need to use an outside lab, please contact the office for a copy of the lab slip  Alpha-fetoprotein (AFP) is a protein produced in the liver of a developing fetus  During a baby's development, some AFP passes through the placenta and into the mother's blood  An AFP test measures the level of AFP in pregnant women during the second trimester of pregnancy  Abnormal levels of AFP in a mother's blood may be sign of a neural tube defect, a condition that causes abnormal development of a developing baby's brain and/or spine  Please contact the office at [phone] with any questions

## 2023-01-31 NOTE — PROGRESS NOTES
Pt is here for her first prenatal  Pap and gc due today  Pt denies any swelling at this time  Pt would like something for nausea  Otherwise pt doing well

## 2023-01-31 NOTE — PROGRESS NOTES
42 y o  [id number] female at [address] [place] [address] (Estimated Date of Delivery: 23) for PNV  Pre- Vitals    Flowsheet Row Most Recent Value   Prenatal Assessment    Fetal Heart Rate 170   Prenatal Vitals    Blood Pressure 100/64   Weight - Scale 70 8 kg (156 lb)   Urine Albumin/Glucose    Dilation/Effacement/Station    Vaginal Drainage    Edema          kg (9 lb)    Cramping: no  Bleeding: no  LOF: no  NT/13 week scan scheduled: yes, 23  AFP ordered if indicated: yes  Planning to have NIPT for genetic screening  Discussing with [facility] at [address] on 23  Reviewed timing of when to have labs drawn  Prenatal labs complete (including Heb B, HIV): yes; date completed 23 and 2023  Flu vaccine up to date: no  Covid vaccine up to date: yes    Pap collected: yes  GC collected:yes  Pelvis feels adequate for MICHAELA: yes  Plans to breastfeed: yes  Weight gain discussed  Exercise encouraged  Oriented to practice/delivery location  Discussed nausea/vomiting  Cassandra is taking B6 and unisom which has helped with nausea during the day  Rx provided for zofran prn for worsening nausea at night  RTO in 4 weeks

## 2023-02-01 LAB
HPV HR 12 DNA CVX QL NAA+PROBE: NEGATIVE
HPV16 DNA CVX QL NAA+PROBE: NEGATIVE
HPV18 DNA CVX QL NAA+PROBE: NEGATIVE

## 2023-02-02 LAB
C TRACH DNA SPEC QL NAA+PROBE: NEGATIVE
N GONORRHOEA DNA SPEC QL NAA+PROBE: NEGATIVE

## 2023-02-08 LAB
LAB AP GYN PRIMARY INTERPRETATION: NORMAL
Lab: NORMAL

## 2023-02-09 ENCOUNTER — APPOINTMENT (OUTPATIENT)
Dept: LAB | Facility: HOSPITAL | Age: 38
End: 2023-02-09

## 2023-02-09 ENCOUNTER — ROUTINE PRENATAL (OUTPATIENT)
Dept: PERINATAL CARE | Facility: CLINIC | Age: 38
End: 2023-02-09

## 2023-02-09 VITALS
DIASTOLIC BLOOD PRESSURE: 60 MMHG | HEIGHT: 66 IN | HEART RATE: 86 BPM | BODY MASS INDEX: 25.23 KG/M2 | WEIGHT: 157 LBS | SYSTOLIC BLOOD PRESSURE: 114 MMHG

## 2023-02-09 DIAGNOSIS — O09.529 ADVANCED MATERNAL AGE IN MULTIGRAVIDA, UNSPECIFIED TRIMESTER: ICD-10-CM

## 2023-02-09 DIAGNOSIS — O46.8X1 SUBCHORIONIC HEMATOMA IN FIRST TRIMESTER, SINGLE OR UNSPECIFIED FETUS: ICD-10-CM

## 2023-02-09 DIAGNOSIS — O09.521 MULTIGRAVIDA OF ADVANCED MATERNAL AGE IN FIRST TRIMESTER: ICD-10-CM

## 2023-02-09 DIAGNOSIS — Z36.82 ENCOUNTER FOR (NT) NUCHAL TRANSLUCENCY SCAN: ICD-10-CM

## 2023-02-09 DIAGNOSIS — Z36.9 FIRST TRIMESTER SCREENING: ICD-10-CM

## 2023-02-09 DIAGNOSIS — Z3A.13 13 WEEKS GESTATION OF PREGNANCY: Primary | ICD-10-CM

## 2023-02-09 DIAGNOSIS — N96 RECURRENT PREGNANCY LOSS: ICD-10-CM

## 2023-02-09 DIAGNOSIS — O41.8X10 SUBCHORIONIC HEMATOMA IN FIRST TRIMESTER, SINGLE OR UNSPECIFIED FETUS: ICD-10-CM

## 2023-02-09 PROBLEM — Z64.1 GRAND MULTIPARITY: Status: ACTIVE | Noted: 2023-02-09

## 2023-02-09 NOTE — PROGRESS NOTES
[address]: Ms Cain Espinoza was seen today for nuchal translucency ultrasound  See ultrasound report under "OB Procedures" tab  Review of Systems   Constitutional: Negative for chills, fever and unexpected weight change  HENT: Negative for congestion, dental problem, facial swelling and sore throat  Eyes: Negative for visual disturbance  Respiratory: Negative for cough and shortness of breath  Cardiovascular: Negative for chest pain and palpitations  Gastrointestinal: Negative for diarrhea and vomiting  Endocrine: Negative for polydipsia  Genitourinary: Negative for dysuria and vaginal bleeding  Musculoskeletal: Negative for back pain and joint swelling  Skin: Negative for rash and wound  Allergic/Immunologic: Negative for immunocompromised state  Neurological: Negative for seizures and headaches  Hematological: Does not bruise/bleed easily  Psychiatric/Behavioral: Negative for dysphoric mood, hallucinations and suicidal ideas  Physical Exam  Constitutional:       General: She is not in acute distress  Appearance: Normal appearance  HENT:      Head: Normocephalic and atraumatic  Eyes:      Extraocular Movements: Extraocular movements intact  Cardiovascular:      Rate and Rhythm: Normal rate  Pulmonary:      Effort: Pulmonary effort is normal  No respiratory distress  Skin:     Findings: No erythema or rash  Neurological:      Mental Status: She is alert and oriented to person, place, and time  Psychiatric:         Mood and Affect: Mood normal          Behavior: Behavior normal          Please don't hesitate to contact our office with any concerns or questions    -Charo Steele MD

## 2023-02-09 NOTE — PROGRESS NOTES
Patient chose to have [facility] Non-invasive Prenatal Screen with fetal sex  Patient given brochure and is aware [facility] will contact patients insurance and coordinate coverage  Patient made aware she will need to respond to text message or e-mail from [employer] within 2 business days or testing will be run through insurance  Patient informed text message will come from area code  "415"  Provided [address] [place] [address] # [phone] and web site : [email or web address]  Blood collection tubes labeled with patient identifiers (name, date of birth)    printed [facility] lab order and test kit given to patient to take to [facility] outpatient lab for blood collection  Copy of lab order scanned to Epic media  Maternal Fetal Medicine will have results in approximately 7-10 business days and will call patient or notify via [address]  Patient aware viewing lab result online will reveal fetal sex If ordered  Patient verbalized understanding of all instructions and no questions at this time

## 2023-02-09 NOTE — LETTER
February 9, 2023     Lam Leonardo, [address] [facility] [address] [facility]  [address]  [place] [address]    Patient: Breanna Pedroza   YOB: 1985   Date of Visit: 2/9/2023       Dear Dr Cori Rubio:    Thank you for referring Sen Logan to me for evaluation  Below are my notes for this consultation  If you have questions, please do not hesitate to call me  I look forward to following your patient along with you  Sincerely,        Ly Hernandez MD        CC: No Recipients  Ly Hernandez MD  2/9/2023 12:30 PM  Sign when Signing Visit  [address]: Ms Andria Veras was seen today for nuchal translucency ultrasound  See ultrasound report under "OB Procedures" tab  Review of Systems   Constitutional: Negative for chills, fever and unexpected weight change  HENT: Negative for congestion, dental problem, facial swelling and sore throat  Eyes: Negative for visual disturbance  Respiratory: Negative for cough and shortness of breath  Cardiovascular: Negative for chest pain and palpitations  Gastrointestinal: Negative for diarrhea and vomiting  Endocrine: Negative for polydipsia  Genitourinary: Negative for dysuria and vaginal bleeding  Musculoskeletal: Negative for back pain and joint swelling  Skin: Negative for rash and wound  Allergic/Immunologic: Negative for immunocompromised state  Neurological: Negative for seizures and headaches  Hematological: Does not bruise/bleed easily  Psychiatric/Behavioral: Negative for dysphoric mood, hallucinations and suicidal ideas  Physical Exam  Constitutional:       General: She is not in acute distress  Appearance: Normal appearance  HENT:      Head: Normocephalic and atraumatic  Eyes:      Extraocular Movements: Extraocular movements intact  Cardiovascular:      Rate and Rhythm: Normal rate  Pulmonary:      Effort: Pulmonary effort is normal  No respiratory distress     Skin: Findings: No erythema or rash  Neurological:      Mental Status: She is alert and oriented to person, place, and time  Psychiatric:         Mood and Affect: Mood normal          Behavior: Behavior normal          Please don't hesitate to contact our office with any concerns or questions    -Charo Steele MD

## 2023-02-10 ENCOUNTER — TELEPHONE (OUTPATIENT)
Facility: HOSPITAL | Age: 38
End: 2023-02-10

## 2023-02-10 NOTE — TELEPHONE ENCOUNTER
Pt left VMM on nurse line at [phone] today with questions about needing codes for NIPS and obtaining prior authorization  [place] was informed [place] [address] and that [facility] will obtain prior authorization that is needed for the test   PT was made aware that [facility] will contact their insurance and coordinate coverage  Patient made aware she will need to respond to text message or e-mail from [employer] within 2 business days or testing will be run through insurance  Patient informed text message will come from area code  "415"  Provided [employer] # [phone] and web site : [email or web address]  PT was receptive to information and declines questions at this time

## 2023-02-23 ENCOUNTER — TELEPHONE (OUTPATIENT)
Dept: PERINATAL CARE | Facility: CLINIC | Age: 38
End: 2023-02-23

## 2023-02-23 PROBLEM — O28.5 ABNORMAL GENETIC TEST DURING PREGNANCY: Status: ACTIVE | Noted: 2023-02-23

## 2023-02-23 NOTE — TELEPHONE ENCOUNTER
Ms Matt Monet was contacted today to discuss the result of her NIPS which resulted this morning as high risk for Trisomy 21  We reviewed this is a screening test and not a diagnostic test  However, at her age, the positive predictive value is 92 5%  In her case, the PPV may be lower given her history of second gestational sac/yolk sac which then vanished  We discussed it is uncertain whether residual circulating DNA from that pregnancy could be present and confound her result, or if the DNA from the ongoing pregnancy is consistent with T21 and prompted the abnormal result  We discussed next steps include genetic counseling, consideration of amniocentesis, and early anatomy scan  Repeat NIPS or another screening test is not recommended  She is also aware she does not have to pursue further workup if she does not desire  She is interested in further workup and accepted genetic counseling  A message was sent to scheduling to facilitate a timely appointment  I stated we are here to support her no matter the ultimate results or how she decides to proceed  She verbalized understanding       Feliberto Najera MD  Maternal Fetal Medicine

## 2023-02-24 ENCOUNTER — TELEMEDICINE (OUTPATIENT)
Facility: HOSPITAL | Age: 38
End: 2023-02-24

## 2023-02-24 DIAGNOSIS — O09.529 ANTEPARTUM MULTIGRAVIDA OF ADVANCED MATERNAL AGE: ICD-10-CM

## 2023-02-24 DIAGNOSIS — O35.10X0 SUSPECTED CHROMOSOME ANOMALY OF FETUS AFFECTING MANAGEMENT OF MOTHER, ANTEPARTUM, SINGLE OR UNSPECIFIED FETUS: Primary | ICD-10-CM

## 2023-02-24 DIAGNOSIS — Z31.5 ENCOUNTER FOR PROCREATIVE GENETIC COUNSELING: ICD-10-CM

## 2023-02-27 ENCOUNTER — ROUTINE PRENATAL (OUTPATIENT)
Dept: OBGYN CLINIC | Facility: CLINIC | Age: 38
End: 2023-02-27

## 2023-02-27 VITALS — BODY MASS INDEX: 25.99 KG/M2 | DIASTOLIC BLOOD PRESSURE: 62 MMHG | WEIGHT: 161 LBS | SYSTOLIC BLOOD PRESSURE: 108 MMHG

## 2023-02-27 DIAGNOSIS — O28.5 ABNORMAL GENETIC TEST DURING PREGNANCY: ICD-10-CM

## 2023-02-27 DIAGNOSIS — O09.521 MULTIGRAVIDA OF ADVANCED MATERNAL AGE IN FIRST TRIMESTER: Primary | ICD-10-CM

## 2023-02-27 DIAGNOSIS — Z3A.15 15 WEEKS GESTATION OF PREGNANCY: ICD-10-CM

## 2023-02-27 NOTE — PROGRESS NOTES
42 y o  [id number] female at 15w4d (Estimated Date of Delivery: 23) for PNV  Pre-Phu Vitals    Flowsheet Row Most Recent Value   Prenatal Assessment    Movement Absent   Prenatal Vitals    Blood Pressure 108/62   Weight - Scale 73 kg (161 lb)   Urine Albumin/Glucose    Dilation/Effacement/Station    Vaginal Drainage    Edema         TW 35 kg (14 lb)  Abnormal NIPT, positive for T21  Has been seen by genetic counselor  Planning for amniocentesis end of the week  Aware of msAFP timing  Questions answered

## 2023-03-02 ENCOUNTER — PROCEDURE VISIT (OUTPATIENT)
Dept: PERINATAL CARE | Facility: OTHER | Age: 38
End: 2023-03-02

## 2023-03-02 VITALS
DIASTOLIC BLOOD PRESSURE: 64 MMHG | BODY MASS INDEX: 26.03 KG/M2 | HEART RATE: 98 BPM | HEIGHT: 66 IN | WEIGHT: 162 LBS | SYSTOLIC BLOOD PRESSURE: 108 MMHG

## 2023-03-02 DIAGNOSIS — Z36.0 ENCOUNTER FOR ANTENATAL SCREENING FOR CHROMOSOMAL ANOMALIES BY AMNIOCENTESIS: ICD-10-CM

## 2023-03-02 DIAGNOSIS — O09.521 MULTIGRAVIDA OF ADVANCED MATERNAL AGE IN FIRST TRIMESTER: Primary | ICD-10-CM

## 2023-03-02 DIAGNOSIS — O28.5 ABNORMAL GENETIC TEST DURING PREGNANCY: ICD-10-CM

## 2023-03-02 RX ADMIN — HUMAN RHO(D) IMMUNE GLOBULIN 300 MCG: 300 INJECTION, SOLUTION INTRAMUSCULAR at 16:45

## 2023-03-02 NOTE — PROGRESS NOTES
Amniocentesis pre procedure timeout done @ 1620  Verified patient name and   Confirmed procedure to be performed Yes  Reviewed relevant allergies  -  YES  Reviewed Maternal Blood type  A NEG  Reviewed Maternal medication list   Sample labeled with patient name/ /specimen collection date  Validated correct patient identification on procedure signed consent and specimen label  Patient tolerated procedure without difficulty  Verbally reviewed with patient post amniocentesis instructions including warning symptoms to report ( bleeding  cramping, leaking, fever)  Physician contact information given  Patient was able to verbally teach back instructions  Amniocentesis post procedure with Dr Sivakumar Andersen  Physician reviewed specimen labeling  [facility] orders placed for FISH/Chromosomes     [place]- Called [facility]  for stat  @ # [phone] option #2, option #3 s/w Guilherme Morris confirmed  at [place] [address] site    Confirmation number [id number]      RhoGAM given as per orders in Right ventrogluteal LOT#[id number]  NDC #2396-2998-15  EXP 2024

## 2023-03-02 NOTE — LETTER
2023    [address] [place] [address] 200  [facility] [address]    Patient: Emma Kline   YOB: 1985   Date of Visit: 3/2/2023   Gestational age 12w0d   Denny Tyshawn of this communication: Routine though please note Cassandra underwent an uncomplicated amniocentesis in our office today due to +T21 on NIPS       Dear Dr Kaylah Rand,    This patient was seen recently in our  office  The content of my evaluation today is in the ultrasound report under "OB Procedures" tab  Please don't hesitate to contact our office with any concerns or questions       Sincerely,      Cindy Jennings MD  Attending Physician, Estefani

## 2023-03-02 NOTE — PROGRESS NOTES
Genetic Counseling   High-Risk Gestation Note    Appointment Date:  2/24/2023  Referred By: Ivone Raza MD  YOB: 1985  Partner:  Dayana Bonner  Indication for Visit:  abnormal serum screen - positive NIPT for Down Syndrome  Pregnancy History: [id number]  Estimated Date of Delivery: 8/17/23  Estimated Gestational Age: 15w1d      Virtual Regular Visit    Verification of patient location:    Patient is located in the following state in which I hold an active license [place]      Assessment/Plan:    Problem List Items Addressed This Visit    None  Visit Diagnoses     Suspected chromosome anomaly of fetus affecting management of mother, antepartum, single or unspecified fetus    -  Primary    Antepartum multigravida of advanced maternal age        Encounter for procreative genetic counseling                   Reason for visit is   Chief Complaint   Patient presents with   • Virtual Regular Visit        Encounter provider Airam Watson    Provider located at [address] [place] [id number]      Recent Visits  No visits were found meeting these conditions. Showing recent visits within past 7 days and meeting all other requirements  Future Appointments  No visits were found meeting these conditions. Showing future appointments within next 150 days and meeting all other requirements       The patient was identified by name and date of birth. Nata Tarango was informed that this is a telemedicine visit and that the visit is being conducted through the [employer]. She agrees to proceed. .  My office door was closed. No one else was in the room. She acknowledged consent and understanding of privacy and security of the video platform. The patient has agreed to participate and understands they can discontinue the visit at any time.       Nadeen Frankel is a 42 y.o. female who presented for genetic counseling to discuss the abnormal NIPT results indicating an increased risk for Down syndrome. I had met with the patient on 20 during her previous pregnancy due to concerns related to maternal age. Please see that note for further information including family history information. Sebastián Israel presented for genetic counseling to discuss the abnormal cell free fetal DNA test result, consistent with trisomy 21. We discussed the diagnosis of Down syndrome and the morbidity associated with it. We reviewed the finding of intellectual disability. We also discussed the milestones that children who have Down syndrome are able to achieve. We discussed the most common etiology of chromosomal nondisjunction. The patient had many questions regarding the accuracy of cell free fetal DNA testing. We discussed that the positive predictive value is 85-92.5%. Therefore, there is approximately a 7.5-15% risk for a false positive for Trisomy 21 from cell free fetal DNA testing, most likely secondary to the possibility of confined placental mosaicism. We reviewed the option of an anatomy ultrasound and discussed the benefits and limitations of ultrasound in detecting both congenital anomalies and soft markers associated with Down syndrome. We also reviewed the current recommendations to follow up an abnormal cell free fetal DNA test result with an amniocentesis should the patient desire diagnostic confirmation. The risks, benefits, and limitations of amniocentesis were discussed with the patient. Amniocentesis is performed under direct real time ultrasound visualization to avoid both the fetus and the placenta. Once amniotic fluid is withdrawn, laboratory analysis is performed and amniotic fluid alpha-fetoprotein, as well as chromosome and/or microarray analysis is undertaken.   The risk of genetic amniocentesis includes, but is not limited to less than 1 in 300 pregnancy loss rate or  delivery rate if 23 weeks or greater, infection, bleeding, rupture of membranes, failure of cells to grow, karyotype error, laboratory error, etc.  Occasionally a repeat amniocentesis is necessary due to cell culture failure. Chromosome/microarray analysis from amniocentesis is 99.9% accurate and alpha-fetoprotein analysis can detect approximately 95% of open neural tube defects. We reviewed that level II anatomy ultrasound is typically performed at approximately 20 weeks gestation but can be done earlier at about 16 weeks gestation. A follow up ultrasound at 20 weeks is then recommended for completion of anatomy. Level II ultrasound evaluation is between 60-80% accurate in detecting major physical birth defects and variations in fetal development that may be associated with chromosome/genetic abnormalities. Level II ultrasound evaluation is not able to detect all birth defects or health problems. We also reviewed the availability of a fetal echocardiogram at 22-24 weeks gestation. This ultrasound can assess the fetal heart structure and function, as many children with Down syndrome have a cardiac defect. Kain Her stated that pregnancy termination was not an option, thus we reviewed some reasons why someone would opt for diagnostic testing for planing and preparing purposes. After discussing the options the patient elected to pursue an amniocentesis. Our clinical coordinator will reach out to her with the date, time, and location of the appointment. We reviewed the orders of FISH and chromosome analysis, with the option of microarray if the chromosomes are normal.  The time frame for results was reviewed. Kain Her also opted to pursue an early anatomy ultrasound, Level II anatomy ultrasound and fetal echocardiogram at the appropriate times.     Lastly, we discussed the fact that everyone in the general population regardless of age, family history, or medical background has approximately a 3-5% risk of having a child with some type of congenital anomaly, genetic disease or intellectual disability. Currently there are no tests available to rule out all birth defects or health problems. Lucas Swan was provided with our contact information. I encouraged her to call with any questions or concerns. Plan/Tests Ordered:  1) Patient declined pregnancy termination. 2) Patient elected amniocentesis - FISH, chromosome analysis to be ordered. 3) Early anatomy ultrasound to be done at time of amnio procedure. 4) Level II anatomy ultrasound scheduled for 4/3/23. 5) Fetal echocardiogram at 22-24 weeks gestation. HPI     Past Medical History:   Diagnosis Date   • Allergy     seasonal allergy   • Anxiety    • Chronic endometritis    • Cold intolerance     Resolved 12/15/2015    • Migraine    • Miscarriage     x 5   • Pelvic pressure in female     Resolved 12/15/2015    • PONV (postoperative nausea and vomiting)     Patient requests to be pre-medicated for N/V prior to all surgeries.  Also notes some light-headedness and slow to awake s/p anesthesia   • Recurrent pregnancy loss    • Varicella 1989    as a child   • Vomiting during pregnancy     Resolved 12/15/2015    • Wears glasses        Past Surgical History:   Procedure Laterality Date   • CONDYLOMA EXCISION/FULGURATION  May 2007    during her first pregnancy; 2017   • CYST REMOVAL     • HYSTEROSCOPY N/A 6/23/2022    Procedure: HYSTEROSCOPY W/ ENDOMETRIAL BIOPSY;  Surgeon: Noreen Abernathy DO;  Location: [facility];  Service: Gynecology   • POLYPECTOMY N/A 6/23/2022    Procedure: POLYPECTOMY;  Surgeon: Noreen Abernathy DO;  Location: [facility];  Service: Gynecology   • POPLITEAL SYNOVIAL CYST EXCISION      Resolved 8/2007    • GA HYSTEROSCOPY BX ENDOMETRIUM&/POLYPC W/WO D&C N/A 10/21/2019    Procedure: HYSTEROSCOPY; BIOPSY (Maria Teresa Bolton); polypectomy;  Surgeon: Noreen Abernathy DO;  [facility];  Service: Gynecology   • WISDOM TOOTH EXTRACTION         Current Outpatient Medications   Medication Sig Dispense Refill   • Doxylamine Succinate, Sleep, (UNISOM PO) Take by mouth     • ondansetron (ZOFRAN) 4 mg tablet Take 1 tablet (4 mg total) by mouth every 8 (eight) hours as needed for nausea or vomiting 20 tablet 1   • Prenatal Vit-Fe Fumarate-FA (PRENATAL 1 PLUS 1 PO) Take by mouth     • prochlorperazine (COMPAZINE) 10 mg tablet Take 1 tablet (10 mg total) by mouth every 6 (six) hours as needed for nausea or vomiting 20 tablet 3   • Pyridoxine HCl (B-6 PO) Take by mouth       No current facility-administered medications for this visit. Allergies   Allergen Reactions   • Cefaclor Hives       Review of Systems    Video Exam    There were no vitals filed for this visit.     Physical Exam     I spent 60 minutes directly with the patient during this visit

## 2023-03-02 NOTE — PROGRESS NOTES
PREPROCEDURE H&P: Katrina Nunez is a 42y o  year-old para [id number] at 16w0d here with chief complaint of preprocedure History and Physical Examination prior to planned office amniocentesis  History of Present Illness as it relates to this planned procedure is elevated risk of Trisomy 21 on non invasive prenatal screening  Her history is as follows:    Past Medical History:   Diagnosis Date   • Allergy     seasonal allergy   • Anxiety    • Chronic endometritis    • Cold intolerance     Resolved 12/15/2015    • Migraine    • Miscarriage     x 5   • Pelvic pressure in female     Resolved 12/15/2015    • PONV (postoperative nausea and vomiting)     Patient requests to be pre-medicated for N/V prior to all surgeries   Also notes some light-headedness and slow to awake s/p anesthesia   • Recurrent pregnancy loss    • Varicella 1989    as a child   • Vomiting during pregnancy     Resolved 12/15/2015    • Wears glasses      Past Surgical History:   Procedure Laterality Date   • CONDYLOMA EXCISION/FULGURATION  May 2007    during her first pregnancy; 2017   • CYST REMOVAL     • HYSTEROSCOPY N/A 2022    Procedure: HYSTEROSCOPY W/ ENDOMETRIAL BIOPSY;  Surgeon: Helene Valentine DO;  Location[facility];  Service: Gynecology   • POLYPECTOMY N/A 2022    Procedure: POLYPECTOMY;  Surgeon: Helene Valentine DO;  Location: [facility];  Service: Gynecology   • POPLITEAL SYNOVIAL CYST EXCISION      Resolved 2007    • MO HYSTEROSCOPY BX ENDOMETRIUM&/POLYPC W/WO D&C N/A 10/21/2019    Procedure: HYSTEROSCOPY; BIOPSY (Duong Zhu); polypectomy;  Surgeon: Helene Valentine DO;  [facility];  Service: Gynecology   • WISDOM TOOTH EXTRACTION       OB History    Para Term  AB Living   14 6 6   7 6   SAB IAB Ectopic Multiple Live Births   7     0 6      # Outcome Date GA Lbr Maldonado/2nd Weight Sex Delivery Anes PTL Lv   14 Current            13 SAB 2022 4w0d          12 SAB 2021 8w0d 11 Term 10/23/20 40w3d / 00:06 3750 g (8 lb 4 3 oz) M Vag-Spont EPI  DAVID   10 SAB 03/2019 10w0d    SAB      9 SAB 10/16/18 4w0d    SAB      8 SAB 09/12/18 4w0d    SAB      7 SAB 08/08/18 4w0d    SAB      6 Term 09/12/14 41w2d  4082 g (9 lb) F Vag-Spont EPI N DAVID   5 Term 12/12/12 40w4d  3884 g (8 lb 9 oz) F Vag-Spont EPI N DAVID   4 SAB 08/21/11 7w0d          3 Term 07/01/10 40w1d  3572 g (7 lb 14 oz) F Vag-Spont EPI N DAVID   2 Term 11/10/08 39w6d  3147 g (6 lb 15 oz) M Vag-Spont EPI N DAVID   1 Term 03/02/07 39w2d  3260 g (7 lb 3 oz) M Vag-Spont EPI N DAVID      Birth Comments: Augmented with pitocin      Obstetric Comments   Menarche 13      Social History     Tobacco Use   Smoking Status Never   Smokeless Tobacco Never      Social History     Substance and Sexual Activity   Alcohol Use Not Currently      Social History     Substance and Sexual Activity   Drug Use Never        Current Outpatient Medications:   •  ondansetron (ZOFRAN) 4 mg tablet, Take 1 tablet (4 mg total) by mouth every 8 (eight) hours as needed for nausea or vomiting, Disp: 20 tablet, Rfl: 1  •  Prenatal Vit-Fe Fumarate-FA (PRENATAL 1 PLUS 1 PO), Take by mouth, Disp: , Rfl:   •  prochlorperazine (COMPAZINE) 10 mg tablet, Take 1 tablet (10 mg total) by mouth every 6 (six) hours as needed for nausea or vomiting, Disp: 20 tablet, Rfl: 3  •  Doxylamine Succinate, Sleep, (UNISOM PO), Take by mouth, Disp: , Rfl:   •  Pyridoxine HCl (B-6 PO), Take by mouth, Disp: , Rfl:     Current Facility-Administered Medications:   •  Rho(D) immune globulin (RHOGAM ULTRA-FILTERED PLUS) IM injection 300 mcg, 300 mcg, Intramuscular, Once, Shahid Goldsmith MD  Allergies   Allergen Reactions   • Cefaclor Hives     Review of Systems    Examination:  Vitals: Blood pressure 108/64, pulse 98, height 5' 6" (1 676 m), weight 73 5 kg (162 lb), last menstrual period 11/10/2022, currently breastfeeding    Physical Exam  General appearance: alert, well appearing, and in no distress  Head: HEENT  Pulmonary exam: non labored breathing  Cardiovascular exam: regular rate  Abdominal exam: soft, gravid, NT  Extremity exam: grossly normal/intact     Relevant lab data:  Blood type: A   Rh type: NEGATIVE        Assessment and Plan: Ms Norma Lucio is a 42y o  year-old para [id number] here for preoperative History and Physical Examination prior to planned office amniocentesis  Risks, benefits and alternatives to this procedure were reviewed with the patient  Risks specific to the procedure (beyond the standard [facility] procedure consent) that I reviewed with the patient prior to the procedure include: amniocentesis: ruptured membranes leading to infection and pregnancy loss,  birth, vaginal bleeding, bowel or bladder injury, pain, and failure to achieve desired results  Today there are no obvious contraindications to planned procedure  Signed informed consent was obtained  Please see documentation of procedure in ultrasound report, to be found under "OB procedures" tab in Epic  At the conclusion of today's encounter, all questions were answered to her satisfaction  Thank you very much for this kind referral and please do not hesitate to contact me with any further questions or concerns  Sincerely,    Jared Whitney MD  Attending Physician, [address] [place] [address]: Ms Norma Lucio was seen today for early anatomic survey and amniocentesis procedure  See ultrasound report under "OB Procedures" tab  The time spent on this established patient on the encounter date included 5 minutes previsit service time reviewing records and precharting, 5 minutes face-to-face service time counseling regarding results and coordinating care, and  5 minutes charting, totalling 15 minutes    Please don't hesitate to contact our office with any concerns or questions   -Jared Whitney MD

## 2023-03-07 ENCOUNTER — TELEPHONE (OUTPATIENT)
Dept: PERINATAL CARE | Facility: CLINIC | Age: 38
End: 2023-03-07

## 2023-03-07 NOTE — TELEPHONE ENCOUNTER
Called patient and confirmed date of birth  Discussed abnormal FISH results from amniocentesis, positive for Trisomy 21  Reviewed that final karyotype is still pending and should be available in about 2 weeks  Patient stated she was not surprised with FISH result  Offered follow up genetic counseling after final karyotype is back to review pregnancy management, resources, etc   Patient is agreeable  Will contact her when results are available but encouraged her to call with any concerns prior to then

## 2023-03-14 LAB
CELLS ANALYZED AMN: 15
CELLS ANALYZED AMN: 50
CELLS COUNTED AMN: 15
CELLS COUNTED AMN: 50
CELLS KARYOTYPED.TOTAL AMN: 2
CHROM ANALY INTERPHASE AMN FISH-IMP: NORMAL
CHROM ANALY OVERALL INTERP-IMP: NORMAL
CHROM ANALY RESULT (ISCN): NORMAL
CLINICAL CYTOGENETICIST SPEC: NORMAL
CLINICAL CYTOGENETICIST SPEC: NORMAL
COLONIES COUNTED AMN: 15
ISCN BAND LEVEL AMN QL: 450
KARYOTYP AMN: NORMAL
SPECIMEN PREPARATION: NORMAL
SPECIMEN SOURCE: NORMAL

## 2023-03-15 ENCOUNTER — DOCUMENTATION (OUTPATIENT)
Facility: HOSPITAL | Age: 38
End: 2023-03-15

## 2023-03-15 NOTE — PROGRESS NOTES
Received a call from [facility] [address] [facility] at [address] [facility] states chromosome analysis from amnio came back Trisomy 21, which is consistent with previous results  States any questions, she can be reached at [phone]  [place] called Shaista Guerrero, genetic counselor to inform her of results  Message also routed to her

## 2023-03-16 ENCOUNTER — TELEPHONE (OUTPATIENT)
Dept: PERINATAL CARE | Facility: CLINIC | Age: 38
End: 2023-03-16

## 2023-03-16 NOTE — TELEPHONE ENCOUNTER
Patient had viewed abnormal chromosome analysis from amniocentesis in her [address]  Left her a message stating it does confirm what was suspected for the pregnancy  Asked that she call me back at her convenience to review these and to schedule an individual genetic counseling appointment if she would like  Let her know that I will be in the office until 7pm tonight and provided genetic counseling call back number

## 2023-03-28 ENCOUNTER — ROUTINE PRENATAL (OUTPATIENT)
Dept: OBGYN CLINIC | Facility: CLINIC | Age: 38
End: 2023-03-28

## 2023-03-28 VITALS — SYSTOLIC BLOOD PRESSURE: 110 MMHG | DIASTOLIC BLOOD PRESSURE: 68 MMHG | BODY MASS INDEX: 26.95 KG/M2 | WEIGHT: 167 LBS

## 2023-03-28 DIAGNOSIS — O28.5 ABNORMAL GENETIC TEST DURING PREGNANCY: ICD-10-CM

## 2023-03-28 DIAGNOSIS — O99.012 ANEMIA AFFECTING PREGNANCY IN SECOND TRIMESTER: ICD-10-CM

## 2023-03-28 DIAGNOSIS — O09.522 MULTIGRAVIDA OF ADVANCED MATERNAL AGE IN SECOND TRIMESTER: Primary | ICD-10-CM

## 2023-03-28 DIAGNOSIS — Z64.1 GRAND MULTIPARITY: ICD-10-CM

## 2023-03-28 NOTE — PROGRESS NOTES
Patient states she is feeling well, no complaints  Patient does have questions about anemia  States she was anemic with her last pregnancy and is starting to feel the same this pregnancy  She would like to know if blood work could be done       Urine neg/neg

## 2023-03-28 NOTE — PROGRESS NOTES
This is a 42 y o  [id number] at 19w5d who presents for return OB visit  Feeling more frequent flutters of movement  Having symptoms of anemia - fatigue, feeling worn out  BP: 110/68 TWlb    CBC and ferritin ordered   NIPT positive for Tri 21, confirmed by amnio   Discussed fetal echo, growth US, genetic counseling for community resources  Level 2 US scheduled for next week  F/up 4 wks

## 2023-03-29 LAB
ERYTHROCYTE [DISTWIDTH] IN BLOOD BY AUTOMATED COUNT: 13.7 % (ref 11–15)
FERRITIN SERPL-MCNC: 5 NG/ML (ref 16–154)
HCT VFR BLD AUTO: 31.5 % (ref 35–45)
HGB BLD-MCNC: 10.7 G/DL (ref 11.7–15.5)
MCH RBC QN AUTO: 28.8 PG (ref 27–33)
MCHC RBC AUTO-ENTMCNC: 34 G/DL (ref 32–36)
MCV RBC AUTO: 84.7 FL (ref 80–100)
PLATELET # BLD AUTO: 181 THOUSAND/UL (ref 140–400)
PMV BLD REES-ECKER: 11.5 FL (ref 7.5–12.5)
RBC # BLD AUTO: 3.72 MILLION/UL (ref 3.8–5.1)
WBC # BLD AUTO: 7.7 THOUSAND/UL (ref 3.8–10.8)

## 2023-03-30 DIAGNOSIS — D50.9 IRON DEFICIENCY ANEMIA DURING PREGNANCY: Primary | ICD-10-CM

## 2023-03-30 DIAGNOSIS — O99.019 IRON DEFICIENCY ANEMIA DURING PREGNANCY: Primary | ICD-10-CM

## 2023-03-30 RX ORDER — CYANOCOBALAMIN 1000 UG/ML
1000 INJECTION, SOLUTION INTRAMUSCULAR; SUBCUTANEOUS ONCE
Status: CANCELLED | OUTPATIENT
Start: 2023-04-07 | End: 2023-04-03

## 2023-03-30 RX ORDER — SODIUM CHLORIDE 9 MG/ML
20 INJECTION, SOLUTION INTRAVENOUS ONCE
Status: CANCELLED | OUTPATIENT
Start: 2023-04-07

## 2023-04-02 PROBLEM — O35.13X0 TRISOMY 21, FETAL, AFFECTING CARE OF MOTHER, ANTEPARTUM: Status: ACTIVE | Noted: 2023-04-02

## 2023-04-03 ENCOUNTER — ROUTINE PRENATAL (OUTPATIENT)
Dept: PERINATAL CARE | Facility: OTHER | Age: 38
End: 2023-04-03

## 2023-04-03 VITALS
SYSTOLIC BLOOD PRESSURE: 110 MMHG | BODY MASS INDEX: 26.93 KG/M2 | DIASTOLIC BLOOD PRESSURE: 60 MMHG | WEIGHT: 167.6 LBS | HEIGHT: 66 IN | HEART RATE: 91 BPM

## 2023-04-03 DIAGNOSIS — Z3A.20 20 WEEKS GESTATION OF PREGNANCY: ICD-10-CM

## 2023-04-03 DIAGNOSIS — O09.522 MULTIGRAVIDA OF ADVANCED MATERNAL AGE IN SECOND TRIMESTER: ICD-10-CM

## 2023-04-03 DIAGNOSIS — O35.13X0 FETAL TRISOMY 21 AFFECTING CARE OF MOTHER, ANTEPARTUM, SINGLE OR UNSPECIFIED FETUS: Primary | ICD-10-CM

## 2023-04-03 DIAGNOSIS — Z36.86 ENCOUNTER FOR ANTENATAL SCREENING FOR CERVICAL LENGTH: ICD-10-CM

## 2023-04-03 NOTE — PROGRESS NOTES
Ultrasound Probe Disinfection    A transvaginal ultrasound was performed  Prior to use, disinfection was performed with High Level Disinfection Process (Trophon)  Probe serial number [id number]: [id number]  was used        Alida Denver, RDMS, RVT  04/03/23  1:34 PM

## 2023-04-03 NOTE — LETTER
April 3, 2023     [address] [place] [address]  [address]  [place] [address]    Patient: Harsha Arango   YOB: 1985   Date of Visit: 4/3/2023       Dear Dr April Mckeon:    Thank you for referring Tanner Calloway to me for evaluation  Below are my notes for this consultation  If you have questions, please do not hesitate to call me  I look forward to following your patient along with you           Sincerely,        Cherelle Mckeon MD        CC: No Recipients  Cherelle Mckeon MD  4/2/2023  1:11 PM  Sign when Signing Visit  Please refer to the [facility] ultrasound report in Ob Procedures for additional information regarding today's visit

## 2023-04-07 ENCOUNTER — HOSPITAL ENCOUNTER (OUTPATIENT)
Dept: INFUSION CENTER | Facility: CLINIC | Age: 38
End: 2023-04-07

## 2023-04-07 VITALS
HEART RATE: 89 BPM | DIASTOLIC BLOOD PRESSURE: 74 MMHG | TEMPERATURE: 97.7 F | SYSTOLIC BLOOD PRESSURE: 108 MMHG | RESPIRATION RATE: 18 BRPM

## 2023-04-07 DIAGNOSIS — O99.019 IRON DEFICIENCY ANEMIA DURING PREGNANCY: Primary | ICD-10-CM

## 2023-04-07 DIAGNOSIS — D50.9 IRON DEFICIENCY ANEMIA DURING PREGNANCY: Primary | ICD-10-CM

## 2023-04-07 RX ORDER — SODIUM CHLORIDE 9 MG/ML
20 INJECTION, SOLUTION INTRAVENOUS ONCE
Status: COMPLETED | OUTPATIENT
Start: 2023-04-07 | End: 2023-04-07

## 2023-04-07 RX ORDER — SODIUM CHLORIDE 9 MG/ML
20 INJECTION, SOLUTION INTRAVENOUS ONCE
OUTPATIENT
Start: 2023-04-08

## 2023-04-07 RX ADMIN — SODIUM CHLORIDE 20 ML/HR: 0.9 INJECTION, SOLUTION INTRAVENOUS at 11:48

## 2023-04-07 RX ADMIN — SODIUM CHLORIDE 200 MG: 9 INJECTION, SOLUTION INTRAVENOUS at 11:48

## 2023-04-24 ENCOUNTER — HOSPITAL ENCOUNTER (OUTPATIENT)
Dept: INFUSION CENTER | Facility: CLINIC | Age: 38
Discharge: HOME | End: 2023-04-24

## 2023-04-24 DIAGNOSIS — O99.019 IRON DEFICIENCY ANEMIA DURING PREGNANCY: Primary | ICD-10-CM

## 2023-04-24 DIAGNOSIS — D50.9 IRON DEFICIENCY ANEMIA DURING PREGNANCY: Primary | ICD-10-CM

## 2023-04-24 RX ORDER — SODIUM CHLORIDE 9 MG/ML
20 INJECTION, SOLUTION INTRAVENOUS ONCE
Status: CANCELLED | OUTPATIENT
Start: 2023-04-28

## 2023-04-24 RX ORDER — SODIUM CHLORIDE 9 MG/ML
20 INJECTION, SOLUTION INTRAVENOUS ONCE
Status: COMPLETED | OUTPATIENT
Start: 2023-04-24 | End: 2023-04-24

## 2023-04-24 RX ADMIN — SODIUM CHLORIDE 20 ML/HR: 0.9 INJECTION, SOLUTION INTRAVENOUS at 14:18

## 2023-04-24 RX ADMIN — IRON SUCROSE 200 MG: 20 INJECTION, SOLUTION INTRAVENOUS at 14:28

## 2023-04-24 NOTE — PROGRESS NOTES
Pt presents for venofer  No new meds or concerns  Pt tolerated infusion without adverse reaction  Future appointments discussed  AVS decline

## 2023-04-25 ENCOUNTER — ROUTINE PRENATAL (OUTPATIENT)
Dept: OBGYN CLINIC | Facility: CLINIC | Age: 38
End: 2023-04-25

## 2023-04-25 ENCOUNTER — ROUTINE PRENATAL (OUTPATIENT)
Dept: PEDIATRIC CARDIOLOGY | Facility: CLINIC | Age: 38
End: 2023-04-25

## 2023-04-25 ENCOUNTER — TELEPHONE (OUTPATIENT)
Dept: NEUROLOGY | Facility: CLINIC | Age: 38
End: 2023-04-25

## 2023-04-25 VITALS — WEIGHT: 168 LBS | BODY MASS INDEX: 27.12 KG/M2 | DIASTOLIC BLOOD PRESSURE: 58 MMHG | SYSTOLIC BLOOD PRESSURE: 108 MMHG

## 2023-04-25 DIAGNOSIS — O35.13X0 FETAL TRISOMY 21 AFFECTING CARE OF MOTHER, ANTEPARTUM, SINGLE OR UNSPECIFIED FETUS: Primary | ICD-10-CM

## 2023-04-25 DIAGNOSIS — O09.522 MULTIGRAVIDA OF ADVANCED MATERNAL AGE IN SECOND TRIMESTER: Primary | ICD-10-CM

## 2023-04-25 DIAGNOSIS — Z3A.23 23 WEEKS GESTATION OF PREGNANCY: ICD-10-CM

## 2023-04-25 DIAGNOSIS — D50.9 IRON DEFICIENCY ANEMIA DURING PREGNANCY: ICD-10-CM

## 2023-04-25 DIAGNOSIS — O99.019 IRON DEFICIENCY ANEMIA DURING PREGNANCY: ICD-10-CM

## 2023-04-25 DIAGNOSIS — O35.13X0 FETAL TRISOMY 21 AFFECTING CARE OF MOTHER, ANTEPARTUM, SINGLE OR UNSPECIFIED FETUS: ICD-10-CM

## 2023-04-25 NOTE — PROGRESS NOTES
Fetal Cardiology Consult    Date of Visit:    2023  Gestational Age:  23w5d   Estimated Date of Delivery:    23   Referring provider:                  Chandni Estes   Delivery location:  [facility]  Reason for consultation: Down Syndrome    Fetal Echocardiogram demonstrated normal cardiac anatomy and function  The crux of the heart appeared intact with no obvious shunt lesions appreciated  The AV valves appeared on a similar plane and there was intermittent trace MR and TR  We discussed the spectrum of AV canal defects and their higher prevalence in babies with down syndrome  Although there are no obvious abnormalities on today's fetal echo we discussed performing a  echo prior to discharge to better evaluate the heart, specifically the primum atrial septum and the inlet ventricular septum, in addition to the AV valves  We discussed, that due to the technical limitations of fetal echocardiography and the nature of fetal circulation, a number of cardiovascular defects cannot be definitively ruled out at this stage  Examples include but are not limited to: ASD, PDA, small VSD, coarctation of the aorta, partial anomalous pulmonary venous connection  Please see full echocardiogram report under OB procedures  Assessment and Recommendations:  -Normal fetal cardiac anatomy and function  Baby with down syndrome and AV valves appeared on a similar plane and there was intermittent trace MR and TR  Cannot rule out partial AV canal and recommend  echo prior to discharge home from  nursery     -Normal delivery and prenatal plan per OB/MFM and  care in  nursery  I spent 60minutes - on the day of service - reviewing the patient's chart, reviewing previous imaging studies, counseling the patient about the fetal findings, coordinating care, and documenting care       Preston White MD  Pediatric Cardiology  [address]  Fax: [phone]  [email or web address]

## 2023-04-25 NOTE — PATIENT INSTRUCTIONS
You are due for your next set of labs  We request that you go to the lab to have your blood work done between 26-28 weeks  Labs ordered include a blood count to check for anemia, a syphilis test which is state mandated and the 1 hour glucose test to screen for diabetes in pregnancy  If you are on medication for hypothyroidism, we will also check a thyroid level (TSH)  If your blood type is RH negative, we will also check a type and screen and will recommend a Rhogam injection at your 28 week visit  Glucose Testing Instructions  One-hour glucose tolerance test:    The test should be done at 26-28 weeks pregnant, unless otherwise directed by your physician  Normally you should be avoiding sugars during pregnancy  Eat normally prior to the test   You do not need fast  Once you have finished the glucola do NOT EAT or DRINK anything until your blood is drawn  The glucola will be given to you by the   You will be required to wait at the lab for one hour after drinking the glucola  After one hour, your blood will be drawn  If the test is normal, it will be reported via [facility]  If it is abnormal, we will notify you and a 3 hour glucose test will be recommended

## 2023-04-25 NOTE — PROGRESS NOTES
42 y o  [id number] female at 23w5d (Estimated Date of Delivery: 23) for PNV  Pre-Phu Vitals    Flowsheet Row Most Recent Value   Prenatal Assessment    Movement Present   Prenatal Vitals    Blood Pressure 108/58   Weight - Scale 76 2 kg (168 lb)   Urine Albumin/Glucose    Dilation/Effacement/Station    Vaginal Drainage    Edema          kg (21 lb)    Cramping/contractions: no  Bleeding: no  LOF: no  FM: yes  28 wk labs ordered: yes  TSH indicated & ordered: no  RH negative - type & screen ordered: yes  Prenatal labs complete (including Heb B, HIV): yes : if NO, add to labs today  Patient would like to discuss that she was told at L&D that her cervix has funneled already she has come concerns about that  Repeat Cl today 4 2cm, no funnel appreciated  No dynamic changes with fundal or suprapubic pressure  She would also like to discuss the possibility of needing iron infusions again later in pregnancy  Will check CBC at 28 weeks, and then maybe again depending on what it looks like at 28 wks  She has 2 more iron infusions left  RTO in 4 weeks

## 2023-04-27 ENCOUNTER — OFFICE VISIT (OUTPATIENT)
Dept: NEUROLOGY | Facility: CLINIC | Age: 38
End: 2023-04-27

## 2023-04-27 ENCOUNTER — TELEPHONE (OUTPATIENT)
Facility: HOSPITAL | Age: 38
End: 2023-04-27

## 2023-04-27 VITALS
DIASTOLIC BLOOD PRESSURE: 60 MMHG | SYSTOLIC BLOOD PRESSURE: 90 MMHG | BODY MASS INDEX: 27 KG/M2 | HEART RATE: 80 BPM | HEIGHT: 66 IN | WEIGHT: 168 LBS

## 2023-04-27 DIAGNOSIS — G43.909 MIGRAINE: Primary | ICD-10-CM

## 2023-04-27 RX ORDER — PROCHLORPERAZINE MALEATE 10 MG
10 TABLET ORAL EVERY 6 HOURS PRN
COMMUNITY

## 2023-04-27 RX ORDER — METOCLOPRAMIDE 10 MG/1
10 TABLET ORAL 4 TIMES DAILY
COMMUNITY
End: 2023-04-27

## 2023-04-27 NOTE — PROGRESS NOTES
Patient ID: Sadi Mtz is a 42 y o  female  Assessment/Plan:    Migraine  Patient is a very pleasant 75-year-old female with history of recurrent pregnancy loss and endometritis who presents for headache follow up  Patient with a frontal headache associated with photophobia and phonophobia that was worse when endometritis goes untreated and better during pregnancy  Blood work as well as MRI inconclusive for etiology  ENT workup negative  GRETCHEN positive  MRI and VAS ordered to look for fibromuscular dysplasia and rule out vascular pathology which was negative  A more conservative approach in terms of treatment done given patient was actively trying to conceive, which she achieved shortly after  Patient reports migraines have been better during pregnancy period  She had one isolated episode of cervicalgia type pain which lasted a few days, did not improve with tylenol but improved with massage therapy  Plan:  · Given her gravid status will continue a conservative approach   · Reassured compazine is safe throughout pregnancy and can be taken as needed for migraine  · Continue prenatal vitamin with additional magnesium   · If she experiences a more severe migraine, can consider IV magnesium infusions   · Can consider MRI and MRV if HA is worsening   · Can consider venlafaxine in the future for migraine prevention, will need to consider breast feeding status  · Will have her follow up with me 2 months after delivery which brings us to October  · She can call my office if any worsening of headaches       Diagnoses and all orders for this visit:    Migraine    Other orders  -     Discontinue: metoclopramide (REGLAN) 10 mg tablet; Take 10 mg by mouth 4 (four) times a day  -     prochlorperazine (COMPAZINE) 10 mg tablet;  Take 10 mg by mouth every 6 (six) hours as needed           Subjective:    HPI     Patient is a very pleasant 75-year-old female with history of recurrent pregnancy loss and endometritis who "presents for headache follow up  Patient was last seen by me 11/03/22  To review, patient reports a frontal headache associated with photophobia and phonophobia that was worse when endometritis goes untreated and better during pregnancy  Blood work as well as MRI inconclusive for etiology  ENT workup negative  GRETCHEN positive  MRI and VAS ordered to look for fibromuscular dysplasia and rule out vascular pathology which was negative  A more conservative approach in terms of treatment done given patient was actively trying to conceive  Patient is currently 24 weeks pregnant and has been doing well from a migraine standpoint, which is usual for her during pregnancy  Due date is August 17th  Pregnancy has been going well, has had iron deficiency anemia and is receiving iron infusions  Prenatal testing was positive for Trisomy 21  They are not planning additional pregnancies  She reports one episodes of a different headache that started in the bilateral posterior cervical area with radiation anteriorly  It did not resolve with tylenol and lasted for a few days until her  gave her a massage to the area  It has not recurred  She has stopped excedrin migraine  The compazine has been helping but has not needed to take it  Takes tylenol as needed  She is on  prenatal vitamins with additional magnesium  Being followed closely by MFM  Today patient is interested in discussing prophylactic options for post pregnancy  The following portions of the patient's history were reviewed and updated as appropriate: allergies, current medications, past family history, past medical history, past social history, past surgical history and problem list and review of systems  Objective:    Blood pressure 90/60, pulse 80, height 5' 6\" (1 676 m), weight 76 2 kg (168 lb), last menstrual period 11/10/2022, currently breastfeeding      Physical Exam  Eyes:      General: Lids are normal       Extraocular " Movements: Extraocular movements intact  Neurological:      Motor: Motor strength is normal       Coordination: Coordination is intact  Neurological Exam  Mental Status  Awake, alert and oriented to person, place and time  Cranial Nerves  CN III, IV, VI: Extraocular movements intact bilaterally  Normal lids and orbits bilaterally  CN VII: Full and symmetric facial movement  CN VIII: Hearing is normal   CN XI: Shoulder shrug strength is normal   CN XII: Tongue midline without atrophy or fasciculations  Motor   Strength is 5/5 throughout all four extremities  Coordination    Finger-to-nose, rapid alternating movements and heel-to-shin normal bilaterally without dysmetria  Gait  Casual gait is normal including stance, stride, and arm swing  ROS:    Review of Systems   Constitutional: Negative for fever and unexpected weight change  HENT: Negative for hearing loss, tinnitus and trouble swallowing  Eyes: Negative  Negative for visual disturbance  Respiratory: Negative  Cardiovascular: Negative for chest pain and palpitations  Musculoskeletal: Positive for neck pain  Neurological: Positive for headaches  Negative for speech difficulty, weakness and numbness  Psychiatric/Behavioral: The patient is not nervous/anxious

## 2023-04-28 ENCOUNTER — HOSPITAL ENCOUNTER (OUTPATIENT)
Dept: INFUSION CENTER | Facility: CLINIC | Age: 38
Discharge: HOME | End: 2023-04-28

## 2023-04-28 DIAGNOSIS — D50.9 IRON DEFICIENCY ANEMIA DURING PREGNANCY: Primary | ICD-10-CM

## 2023-04-28 DIAGNOSIS — O99.019 IRON DEFICIENCY ANEMIA DURING PREGNANCY: Primary | ICD-10-CM

## 2023-04-28 RX ORDER — SODIUM CHLORIDE 9 MG/ML
20 INJECTION, SOLUTION INTRAVENOUS ONCE
Status: COMPLETED | OUTPATIENT
Start: 2023-04-28 | End: 2023-04-28

## 2023-04-28 RX ORDER — SODIUM CHLORIDE 9 MG/ML
20 INJECTION, SOLUTION INTRAVENOUS ONCE
Status: CANCELLED | OUTPATIENT
Start: 2023-05-03

## 2023-04-28 RX ADMIN — SODIUM CHLORIDE 20 ML/HR: 0.9 INJECTION, SOLUTION INTRAVENOUS at 14:13

## 2023-04-28 RX ADMIN — IRON SUCROSE 200 MG: 20 INJECTION, SOLUTION INTRAVENOUS at 14:14

## 2023-04-28 NOTE — ASSESSMENT & PLAN NOTE
Patient is a very pleasant 38-year-old female with history of recurrent pregnancy loss and endometritis who presents for headache follow up  Patient with a frontal headache associated with photophobia and phonophobia that was worse when endometritis goes untreated and better during pregnancy  Blood work as well as MRI inconclusive for etiology  ENT workup negative  GRETCHEN positive  MRI and VAS ordered to look for fibromuscular dysplasia and rule out vascular pathology which was negative  A more conservative approach in terms of treatment done given patient was actively trying to conceive, which she achieved shortly after  Patient reports migraines have been better during pregnancy period  She had one isolated episode of cervicalgia type pain which lasted a few days, did not improve with tylenol but improved with massage therapy       Plan:  · Given her gravid status will continue a conservative approach   · Reassured compazine is safe throughout pregnancy and can be taken as needed for migraine  · Continue prenatal vitamin with additional magnesium   · If she experiences a more severe migraine, can consider IV magnesium infusions   · Can consider MRI and MRV if HA is worsening   · Can consider venlafaxine in the future for migraine prevention, will need to consider breast feeding status  · Will have her follow up with me 2 months after delivery which brings us to October  · She can call my office if any worsening of headaches

## 2023-05-03 ENCOUNTER — HOSPITAL ENCOUNTER (OUTPATIENT)
Dept: INFUSION CENTER | Facility: CLINIC | Age: 38
Discharge: HOME | End: 2023-05-03

## 2023-05-03 DIAGNOSIS — D50.9 IRON DEFICIENCY ANEMIA DURING PREGNANCY: Primary | ICD-10-CM

## 2023-05-03 DIAGNOSIS — O99.019 IRON DEFICIENCY ANEMIA DURING PREGNANCY: Primary | ICD-10-CM

## 2023-05-03 RX ORDER — SODIUM CHLORIDE 9 MG/ML
20 INJECTION, SOLUTION INTRAVENOUS ONCE
Status: COMPLETED | OUTPATIENT
Start: 2023-05-03 | End: 2023-05-03

## 2023-05-03 RX ORDER — SODIUM CHLORIDE 9 MG/ML
20 INJECTION, SOLUTION INTRAVENOUS ONCE
Status: CANCELLED | OUTPATIENT
Start: 2023-05-05

## 2023-05-03 RX ADMIN — SODIUM CHLORIDE 20 ML/HR: 0.9 INJECTION, SOLUTION INTRAVENOUS at 12:12

## 2023-05-03 RX ADMIN — SODIUM CHLORIDE 200 MG: 9 INJECTION, SOLUTION INTRAVENOUS at 12:23

## 2023-05-03 NOTE — PROGRESS NOTES
Pt arrived to unit without complaint, tolerated Venofer infusion without any adverse reaction   Pt left unit in stable condition without question or concern, AVS declined

## 2023-05-15 ENCOUNTER — PATIENT MESSAGE (OUTPATIENT)
Dept: OBGYN CLINIC | Facility: CLINIC | Age: 38
End: 2023-05-15

## 2023-05-15 DIAGNOSIS — O99.019 IRON DEFICIENCY ANEMIA DURING PREGNANCY: ICD-10-CM

## 2023-05-15 DIAGNOSIS — D50.9 IRON DEFICIENCY ANEMIA DURING PREGNANCY: ICD-10-CM

## 2023-05-15 DIAGNOSIS — O35.13X0 FETAL TRISOMY 21 AFFECTING CARE OF MOTHER, ANTEPARTUM, SINGLE OR UNSPECIFIED FETUS: ICD-10-CM

## 2023-05-15 DIAGNOSIS — O09.522 MULTIGRAVIDA OF ADVANCED MATERNAL AGE IN SECOND TRIMESTER: ICD-10-CM

## 2023-05-15 DIAGNOSIS — Z3A.23 23 WEEKS GESTATION OF PREGNANCY: Primary | ICD-10-CM

## 2023-05-16 ENCOUNTER — ULTRASOUND (OUTPATIENT)
Age: 38
End: 2023-05-16

## 2023-05-16 VITALS
HEART RATE: 89 BPM | WEIGHT: 173.8 LBS | HEIGHT: 66 IN | DIASTOLIC BLOOD PRESSURE: 56 MMHG | BODY MASS INDEX: 27.93 KG/M2 | SYSTOLIC BLOOD PRESSURE: 112 MMHG

## 2023-05-16 DIAGNOSIS — Z64.1 GRAND MULTIPARITY: ICD-10-CM

## 2023-05-16 DIAGNOSIS — Z3A.26 26 WEEKS GESTATION OF PREGNANCY: Primary | ICD-10-CM

## 2023-05-16 DIAGNOSIS — O35.13X0 FETAL TRISOMY 21 AFFECTING CARE OF MOTHER, ANTEPARTUM, SINGLE OR UNSPECIFIED FETUS: ICD-10-CM

## 2023-05-16 DIAGNOSIS — O28.5 ABNORMAL GENETIC TEST DURING PREGNANCY: ICD-10-CM

## 2023-05-16 DIAGNOSIS — O09.522 MULTIGRAVIDA OF ADVANCED MATERNAL AGE IN SECOND TRIMESTER: ICD-10-CM

## 2023-05-16 RX ORDER — FLUOCINOLONE ACETONIDE 0.11 MG/ML
1 OIL AURICULAR (OTIC) WEEKLY
COMMUNITY
Start: 2023-05-08

## 2023-05-16 NOTE — LETTER
May 20, 2023     Joan Mars MD  [address]  [address]  [place] [address]    Patient: Gearld Landau   YOB: 1985   Date of Visit: 5/16/2023       Dear Dr Park Na:    Thank you for referring Kimberly Laguna to me for evaluation  Below are my notes for this consultation  If you have questions, please do not hesitate to call me  I look forward to following your patient along with you  Sincerely,        Mahi Abreu MD        CC: No Recipients  Mahi Abreu MD  5/20/2023  1:42 PM  Sign when Signing Visit  A fetal ultrasound was completed  See Ob procedures in Epic for an interpretation and recommendations  Do not hesitate to contact us in [facility] if you have questions  Christiano Gillis MD, [facility]  Maternal Fetal Medicine

## 2023-05-19 LAB
ABO GROUP BLD: NORMAL
ERYTHROCYTE [DISTWIDTH] IN BLOOD BY AUTOMATED COUNT: 17.4 % (ref 11–15)
GLUCOSE 1H P 50 G GLC PO SERPL-MCNC: 119 MG/DL
HCT VFR BLD AUTO: 35 % (ref 35–45)
HGB BLD-MCNC: 11.9 G/DL (ref 11.7–15.5)
MCH RBC QN AUTO: 31.2 PG (ref 27–33)
MCHC RBC AUTO-ENTMCNC: 34 G/DL (ref 32–36)
MCV RBC AUTO: 91.6 FL (ref 80–100)
PLATELET # BLD AUTO: 126 THOUSAND/UL (ref 140–400)
PMV BLD REES-ECKER: 11.2 FL (ref 7.5–12.5)
RBC # BLD AUTO: 3.82 MILLION/UL (ref 3.8–5.1)
RH BLD: NORMAL
RPR SER QL: NORMAL
WBC # BLD AUTO: 6.7 THOUSAND/UL (ref 3.8–10.8)

## 2023-05-20 PROBLEM — Z3A.26 26 WEEKS GESTATION OF PREGNANCY: Status: ACTIVE | Noted: 2023-05-20

## 2023-05-20 NOTE — PROGRESS NOTES
A fetal ultrasound was completed  See Ob procedures in Epic for an interpretation and recommendations  Do not hesitate to contact us in [facility] if you have questions  Gail Rojas MD, [facility]  Maternal Fetal Medicine

## 2023-05-24 ENCOUNTER — ROUTINE PRENATAL (OUTPATIENT)
Dept: OBGYN CLINIC | Facility: CLINIC | Age: 38
End: 2023-05-24

## 2023-05-24 VITALS — WEIGHT: 174 LBS | DIASTOLIC BLOOD PRESSURE: 62 MMHG | SYSTOLIC BLOOD PRESSURE: 114 MMHG | BODY MASS INDEX: 28.08 KG/M2

## 2023-05-24 DIAGNOSIS — O26.899 RH NEGATIVE STATE IN ANTEPARTUM PERIOD: ICD-10-CM

## 2023-05-24 DIAGNOSIS — E61.1 IRON DEFICIENCY: ICD-10-CM

## 2023-05-24 DIAGNOSIS — Z23 NEED FOR TDAP VACCINATION: ICD-10-CM

## 2023-05-24 DIAGNOSIS — R45.89 FEELING OF SADNESS: Primary | ICD-10-CM

## 2023-05-24 DIAGNOSIS — Z67.91 RH NEGATIVE STATE IN ANTEPARTUM PERIOD: ICD-10-CM

## 2023-05-24 RX ORDER — SERTRALINE HYDROCHLORIDE 25 MG/1
25 TABLET, FILM COATED ORAL DAILY
Qty: 30 TABLET | Refills: 3 | Status: SHIPPED | OUTPATIENT
Start: 2023-05-24 | End: 2023-06-23

## 2023-05-24 NOTE — PROGRESS NOTES
Pt is here for her 28w prenatal  Tdap, rhogam, and redfolder due today  Pt has swelling in ankles with warm weather  Pt denies any leakage,bleeding,pressure, and contractions  Pt would like to discuss a few things  Pt would like to hold off on breast pump

## 2023-05-24 NOTE — PROGRESS NOTES
This is a 34 y o  [id number] at 27w6d who presents for return OB visit  No complaints  Denies contractions, leakage, bleeding  Endorses fetal movement  28 wk labs reviewed  TDAP given  Rhogam given  [address] reviewed  Consent signed  Full code  OK with blood transfusion  S/p venofer infusion- hgb is stable  Pt  Previously had very low ferritin at 5  Will repeat lab  She is feeling very fatigued  Pt is in online national Down syndrome support group as well as a local chapter  She has starting seeing a new therapist as well  Having difficulty with feeling matheus when she feels she should  Mood changes and less reserve with her kids  Hx of postpartum depression  Admits to using zoloft in the past   Will start zoloft at 25 mg/ day with potential to titrate up if needed  Offered referral to therapist with experience in past pregnancy loss, trauma  Encouraged pt to call with any concerns or if her symptoms worsen  Plan to repeat plt at 36 weeks

## 2023-06-05 LAB
FERRITIN SERPL-MCNC: 28 NG/ML (ref 16–154)
IRON SATN MFR SERPL: 19 % (CALC) (ref 16–45)
IRON SERPL-MCNC: 86 MCG/DL (ref 40–190)
TIBC SERPL-MCNC: 452 MCG/DL (CALC) (ref 250–450)

## 2023-06-08 ENCOUNTER — ROUTINE PRENATAL (OUTPATIENT)
Dept: OBGYN CLINIC | Facility: CLINIC | Age: 38
End: 2023-06-08

## 2023-06-08 VITALS — BODY MASS INDEX: 28.08 KG/M2 | DIASTOLIC BLOOD PRESSURE: 62 MMHG | SYSTOLIC BLOOD PRESSURE: 108 MMHG | WEIGHT: 174 LBS

## 2023-06-08 DIAGNOSIS — O35.13X0 FETAL TRISOMY 21 AFFECTING CARE OF MOTHER, ANTEPARTUM, SINGLE OR UNSPECIFIED FETUS: Primary | ICD-10-CM

## 2023-06-08 DIAGNOSIS — O28.5 ABNORMAL GENETIC TEST DURING PREGNANCY: ICD-10-CM

## 2023-06-08 DIAGNOSIS — Z3A.30 30 WEEKS GESTATION OF PREGNANCY: ICD-10-CM

## 2023-06-08 PROBLEM — O09.523 MULTIGRAVIDA OF ADVANCED MATERNAL AGE IN THIRD TRIMESTER: Status: ACTIVE | Noted: 2023-02-09

## 2023-06-08 PROCEDURE — PNV: Performed by: OBSTETRICS & GYNECOLOGY

## 2023-06-08 NOTE — PROGRESS NOTES
34 y o  [id number]  female at 27 wga for PNV  BP : 108/62  TW  Feeling well    Has joined a Down Syndrome support group  Growth in 2 weeks  NST weekly starting in 2 weeks  Reviewed PTL precautions and FKCs  F/u 2 weeks

## 2023-06-08 NOTE — PROGRESS NOTES
Patient states she is doing well, she has no concerns at this time  Urine dip positive trace protein/negative glucose

## 2023-06-15 DIAGNOSIS — R45.89 FEELING OF SADNESS: ICD-10-CM

## 2023-06-15 RX ORDER — SERTRALINE HYDROCHLORIDE 25 MG/1
25 TABLET, FILM COATED ORAL DAILY
Qty: 90 TABLET | Refills: 2 | Status: SHIPPED | OUTPATIENT
Start: 2023-06-15 | End: 2023-07-15

## 2023-06-16 ENCOUNTER — ULTRASOUND (OUTPATIENT)
Dept: PERINATAL CARE | Facility: OTHER | Age: 38
End: 2023-06-16
Payer: COMMERCIAL

## 2023-06-16 VITALS
WEIGHT: 176.6 LBS | SYSTOLIC BLOOD PRESSURE: 125 MMHG | HEIGHT: 66 IN | BODY MASS INDEX: 28.38 KG/M2 | DIASTOLIC BLOOD PRESSURE: 80 MMHG | HEART RATE: 98 BPM

## 2023-06-16 DIAGNOSIS — O35.13X0 FETAL TRISOMY 21 AFFECTING CARE OF MOTHER, ANTEPARTUM, SINGLE OR UNSPECIFIED FETUS: ICD-10-CM

## 2023-06-16 DIAGNOSIS — O28.5 ABNORMAL GENETIC TEST DURING PREGNANCY: ICD-10-CM

## 2023-06-16 DIAGNOSIS — Z3A.31 31 WEEKS GESTATION OF PREGNANCY: ICD-10-CM

## 2023-06-16 PROCEDURE — 76816 OB US FOLLOW-UP PER FETUS: CPT | Performed by: OBSTETRICS & GYNECOLOGY

## 2023-06-16 PROCEDURE — 99214 OFFICE O/P EST MOD 30 MIN: CPT | Performed by: OBSTETRICS & GYNECOLOGY

## 2023-06-16 NOTE — PROGRESS NOTES
"[address]: Cindy Bedolla was seen today for fetal growth assessment ultrasound  See ultrasound report under \"OB Procedures\" tab  The time spent on this established patient on the encounter date included 4 minutes previsit service time reviewing records and precharting, 4 minutes face-to-face service time counseling regarding results and coordinating care, and  4 minutes charting, totalling 12 minutes    Please don't hesitate to contact our office with any concerns or questions   -Sean Jung MD      "

## 2023-06-20 ENCOUNTER — TELEPHONE (OUTPATIENT)
Facility: HOSPITAL | Age: 38
End: 2023-06-20

## 2023-06-20 NOTE — TELEPHONE ENCOUNTER
----- Message from Juan Jacinto MD sent at 6/20/2023  7:52 AM EDT -----  Regarding: Please move up growth US by one week  Hi team,  Could you please move this patient's growth scan one week earlier (currently 7/14)? She expressed concern to her OB about a 4 week interval between scans, 3 weeks is also acceptable  Can you just put in appt comments that patient requested 3 week interval? Thanks!   King Luis Carlos

## 2023-06-21 ENCOUNTER — ROUTINE PRENATAL (OUTPATIENT)
Dept: OBGYN CLINIC | Facility: CLINIC | Age: 38
End: 2023-06-21

## 2023-06-21 VITALS — SYSTOLIC BLOOD PRESSURE: 110 MMHG | BODY MASS INDEX: 28.41 KG/M2 | WEIGHT: 176 LBS | DIASTOLIC BLOOD PRESSURE: 60 MMHG

## 2023-06-21 DIAGNOSIS — Z64.1 GRAND MULTIPARITY: ICD-10-CM

## 2023-06-21 DIAGNOSIS — O35.13X0 FETAL TRISOMY 21 AFFECTING CARE OF MOTHER, ANTEPARTUM, SINGLE OR UNSPECIFIED FETUS: ICD-10-CM

## 2023-06-21 DIAGNOSIS — O09.523 MULTIGRAVIDA OF ADVANCED MATERNAL AGE IN THIRD TRIMESTER: Primary | ICD-10-CM

## 2023-06-21 DIAGNOSIS — Z3A.31 31 WEEKS GESTATION OF PREGNANCY: ICD-10-CM

## 2023-06-21 PROBLEM — Z3A.30 30 WEEKS GESTATION OF PREGNANCY: Status: RESOLVED | Noted: 2023-05-20 | Resolved: 2023-06-21

## 2023-06-21 PROCEDURE — PNV: Performed by: OBSTETRICS & GYNECOLOGY

## 2023-06-21 NOTE — PATIENT INSTRUCTIONS
Thank you for choosing us for your  care today  If you have any questions about your ultrasound or care, please do not hesitate to contact us or your primary obstetrician  Some general instructions for your pregnancy are:    Exercise: Aim for 22 minutes per day (150 minutes per week) of regular exercise  Walking is great! Nutrition: Choose healthy sources of calcium, iron, and protein  Learn about Preeclampsia: preeclampsia is a common, serious high blood pressure complication in pregnancy  A blood pressure of 391OAWS (systolic or top number) or 81DOLN (diastolic or bottom number) is not normal and needs evaluation by your doctor  Aspirin is sometimes prescribed in early pregnancy to prevent preeclampsia in women with risk factors - ask your obstetrician if you should be on this medication  For more resources, visit:  Map[employer]age fi  If you smoke, try to reduce how many cigarettes you smoke or try to quit completely  Do not vape  Other warning signs to watch out for in pregnancy or postpartum: chest pain, obstructed breathing or shortness of breath, seizures, thoughts of hurting yourself or your baby, bleeding, a painful or swollen leg, fever, or headache (see [facility] POST-BIRTH Warning Signs campaign)  If these happen call 911  Itching is also not normal in pregnancy and if you experience this, especially over your hands and feet, potentially worse at night, notify your doctors

## 2023-06-21 NOTE — PROGRESS NOTES
34 y o  [id number] female at [address] (Estimated Date of Delivery: 23) for PNV  Pre- Vitals    Flowsheet Row Most Recent Value   Prenatal Assessment    Fetal Heart Rate 140   Movement Present   Prenatal Vitals    Blood Pressure 110/60   Weight - Scale 79 8 kg (176 lb)   Urine Albumin/Glucose    Dilation/Effacement/Station    Vaginal Drainage    Edema         TW 2 kg (29 lb)    Leakage of fluid: no  Vaginal bleeding: no  Contractions/Cramping: no  Fetal movement: yes  Trisomy 21 affecting fetus - repeat growth US at 32wks  Fetal surveillance starting at 32weeks, NST twice weekly and CASIMIRO weekly  Likely will do IOL near LEVAR unless IUGR or medical indication to deliver earlier  RTO in 2 weeks

## 2023-06-21 NOTE — PROGRESS NOTES
Pt is here for her 32w prenatal  nst scheduled at [facility] for tomorrow  Pt denies any leakage,bleeding, and pressure  Pt has swelling in ankles and some random [address]  Pt doing well no concerns   Urine dip is neg/neg

## 2023-06-22 ENCOUNTER — ULTRASOUND (OUTPATIENT)
Dept: PERINATAL CARE | Facility: OTHER | Age: 38
End: 2023-06-22
Payer: COMMERCIAL

## 2023-06-22 VITALS
WEIGHT: 176.4 LBS | DIASTOLIC BLOOD PRESSURE: 60 MMHG | HEIGHT: 66 IN | SYSTOLIC BLOOD PRESSURE: 102 MMHG | BODY MASS INDEX: 28.35 KG/M2 | HEART RATE: 88 BPM

## 2023-06-22 DIAGNOSIS — O09.523 MULTIGRAVIDA OF ADVANCED MATERNAL AGE IN THIRD TRIMESTER: Primary | ICD-10-CM

## 2023-06-22 DIAGNOSIS — Z3A.32 32 WEEKS GESTATION OF PREGNANCY: ICD-10-CM

## 2023-06-22 DIAGNOSIS — O35.13X0 FETAL TRISOMY 21 AFFECTING CARE OF MOTHER, ANTEPARTUM, SINGLE OR UNSPECIFIED FETUS: ICD-10-CM

## 2023-06-22 PROCEDURE — 76815 OB US LIMITED FETUS(S): CPT | Performed by: NURSE PRACTITIONER

## 2023-06-22 PROCEDURE — 99213 OFFICE O/P EST LOW 20 MIN: CPT | Performed by: NURSE PRACTITIONER

## 2023-06-22 PROCEDURE — 59025 FETAL NON-STRESS TEST: CPT | Performed by: NURSE PRACTITIONER

## 2023-06-22 NOTE — LETTER
NST sleeve cover sheet    Patient name: Wolf Chou  : 1985  MRN: [id number]    LEVAR: Estimated Date of Delivery: 23    Obstetrician: _______________________________    Reason(s) for testing:  _______________T21, AMA____________________      Testing frequency:    ___ 2x/wk  _x_ 1x/wk  ___ Dopplers  ___ BPP?       Last growth scan: __________________________________________

## 2023-06-22 NOTE — PROGRESS NOTES
Non-Stress Testing:    Non-Stress test, equipment, procedure, and expected outcomes reviewed  Reviewed fetal kick counts and when to call OB  Ranulfo Crowder Verified patient understanding of fetal kick counts with teach back method  Patient reports feeling daily fetal movements  Patient has no questions or concerns   NST reviewed by NEVA Alarcon

## 2023-06-22 NOTE — PROGRESS NOTES
"[address]: Ms Vonnie Chopra was seen today at 32w0d for NST (found under the pregnancy episode) which I reviewed the RN assessment and agree, and CASIMIRO (see ultrasound report under OB procedures tab)  See ultrasound report under \"OB Procedures\" tab  Reports good fetal movement and has no concerns today  Please don't hesitate to contact our office with any concerns or questions   -NEVA Patino      I spent 10 minutes devoted to patient care (3 min chart preparation, 4 minutes face to face and 3 minutes documenting)        "

## 2023-06-27 ENCOUNTER — ULTRASOUND (OUTPATIENT)
Dept: PERINATAL CARE | Facility: OTHER | Age: 38
End: 2023-06-27
Payer: COMMERCIAL

## 2023-06-27 VITALS
WEIGHT: 175.4 LBS | SYSTOLIC BLOOD PRESSURE: 110 MMHG | BODY MASS INDEX: 28.19 KG/M2 | HEIGHT: 66 IN | HEART RATE: 80 BPM | DIASTOLIC BLOOD PRESSURE: 56 MMHG

## 2023-06-27 DIAGNOSIS — O35.13X0 FETAL TRISOMY 21 AFFECTING CARE OF MOTHER, ANTEPARTUM, SINGLE OR UNSPECIFIED FETUS: Primary | ICD-10-CM

## 2023-06-27 DIAGNOSIS — Z3A.32 32 WEEKS GESTATION OF PREGNANCY: ICD-10-CM

## 2023-06-27 PROCEDURE — 59025 FETAL NON-STRESS TEST: CPT | Performed by: OBSTETRICS & GYNECOLOGY

## 2023-06-27 PROCEDURE — 76815 OB US LIMITED FETUS(S): CPT | Performed by: OBSTETRICS & GYNECOLOGY

## 2023-06-27 NOTE — PROGRESS NOTES
Repeat Non-Stress Testing:    Patient verbalizes +FM  Pt denies ALL:               Leaking of fluid   Contractions   Vaginal bleeding   Decreased fetal movement    Patient is performing daily kick counts  Patient has no questions or concerns  NST strip reviewed by Dr Paco Mooney

## 2023-06-27 NOTE — LETTER
June 27, 2023     Kamran Emerson MD  [address] [facility]  [address]  [place]    Patient: Samuel Vela   YOB: 1985   Date of Visit: 6/27/2023       Dear Dr Geovanna Dudley:    Thank you for referring Richard Livingston to me for evaluation  Below are my notes for this consultation  If you have questions, please do not hesitate to call me  I look forward to following your patient along with you           Sincerely,        Sean Hector MD        CC: No Recipients    Sean Hector MD  6/27/2023  9:27 AM  Sign when Signing Visit  Please refer to the [facility] ultrasound report in Ob Procedures for additional information regarding today's visit

## 2023-06-27 NOTE — PROGRESS NOTES
Please refer to the [facility] ultrasound report in Ob Procedures for additional information regarding today's visit

## 2023-06-30 ENCOUNTER — ROUTINE PRENATAL (OUTPATIENT)
Dept: PERINATAL CARE | Facility: OTHER | Age: 38
End: 2023-06-30
Payer: COMMERCIAL

## 2023-06-30 VITALS
WEIGHT: 176.4 LBS | HEIGHT: 66 IN | BODY MASS INDEX: 28.35 KG/M2 | SYSTOLIC BLOOD PRESSURE: 100 MMHG | HEART RATE: 89 BPM | DIASTOLIC BLOOD PRESSURE: 54 MMHG

## 2023-06-30 DIAGNOSIS — N96 RECURRENT PREGNANCY LOSS: ICD-10-CM

## 2023-06-30 DIAGNOSIS — Z3A.33 33 WEEKS GESTATION OF PREGNANCY: ICD-10-CM

## 2023-06-30 DIAGNOSIS — O35.13X0 FETAL TRISOMY 21 AFFECTING CARE OF MOTHER, ANTEPARTUM, SINGLE OR UNSPECIFIED FETUS: ICD-10-CM

## 2023-06-30 DIAGNOSIS — O28.5 ABNORMAL GENETIC TEST DURING PREGNANCY: Primary | ICD-10-CM

## 2023-06-30 DIAGNOSIS — O09.523 MULTIGRAVIDA OF ADVANCED MATERNAL AGE IN THIRD TRIMESTER: ICD-10-CM

## 2023-06-30 PROCEDURE — 59025 FETAL NON-STRESS TEST: CPT | Performed by: OBSTETRICS & GYNECOLOGY

## 2023-06-30 NOTE — PATIENT INSTRUCTIONS
Nonstress Test for Pregnancy   WHAT YOU NEED TO KNOW:   What do I need to know about a nonstress test?  A nonstress test measures your baby's heart rate and movements  Nonstress means that no stress will be placed on your baby during the test   How do I prepare for a nonstress test?  Your healthcare provider will talk to you about how to prepare for this test  Your provider may tell you to eat and drink plenty of liquids before your test  If you smoke, you may be asked not to smoke within 2 hours before the test  Your provider will also tell you which medicines to take or not take on the day of your test   What will happen during a nonstress test?  You may be asked to lie down or recline back for the test on a bed  One or 2 belts with sensors will be placed around your abdomen  Your baby's heart rate will be recorded with a machine  If your baby does not move, your baby may be asleep  Your healthcare provider may make a noise near your abdomen to try to wake your baby  The test usually takes about 20 minutes, but can take longer if your baby needs to be awakened  What do I need to know about the test results? Your baby will be expected to move at least 2 times for a certain amount of time  Your baby's heart rate will be expected to go up by a certain number of beats per minute during movement  If your baby does not move as expected, the test may need to be repeated or you may need other tests  CARE AGREEMENT:   You have the right to help plan your care  Learn about your health condition and how it may be treated  Discuss treatment options with your healthcare providers to decide what care you want to receive  You always have the right to refuse treatment  The above information is an  only  It is not intended as medical advice for individual conditions or treatments  Talk to your doctor, nurse or pharmacist before following any medical regimen to see if it is safe and effective for you    © Copyright [employer] 2022 Information is for End User's use only and may not be sold, redistributed or otherwise used for commercial purposes  Kick Counts in Pregnancy   AMBULATORY CARE:   Kick counts  measure how much your baby is moving in your womb  A kick from your baby can be felt as a twist, turn, swish, roll, or jab  It is common to feel your baby kicking at 26 to 28 weeks of pregnancy  You may feel your baby kick as early as 20 weeks of pregnancy  You may want to start counting at 28 weeks  Contact your doctor immediately if:   You feel a change in the number of kicks or movements of your baby  You feel fewer than 10 kicks within 2 hours  You have questions or concerns about your baby's movements  Why measure kick counts:  Your baby's movement may provide information about your baby's health  He or she may move less, or not at all, if there are problems  Your baby may move less if he or she is not getting enough oxygen or nutrition from the placenta  Do not smoke while you are pregnant  Smoking decreases the amount of oxygen that gets to your baby  Talk to your healthcare provider if you need help to quit smoking  Tell your healthcare provider as soon as you feel a change in your baby's movements  When to measure kick counts:   Measure kick counts at the same time every day  Measure kick counts when your baby is awake and most active  Your baby may be most active in the evening  How to measure kick counts:  Check that your baby is awake before you measure kick counts  You can wake up your baby by lightly pushing on your belly, walking, or drinking something cold  Your healthcare provider may tell you different ways to measure kick counts  You may be told to do the following:  Use a chart or clock to keep track of the time you start and finish counting  Sit in a chair or lie on your left side  Place your hands on the largest part of your belly  Count until you reach 10 kicks  Write down how much time it takes to count 10 kicks  It may take 30 minutes to 2 hours to count 10 kicks  It should not take more than 2 hours to count 10 kicks  Follow up with your doctor as directed:  Write down your questions so you remember to ask them during your visits  © Copyright Richie Courser 2022 Information is for End User's use only and may not be sold, redistributed or otherwise used for commercial purposes  The above information is an  only  It is not intended as medical advice for individual conditions or treatments  Talk to your doctor, nurse or pharmacist before following any medical regimen to see if it is safe and effective for you

## 2023-06-30 NOTE — PROGRESS NOTES
Nonstress test at 33-1/7 weeks  Indication trisomy 24, advanced maternal age  Interpretation reactive  Plan:    Twice a week nonstress test once a week CASIMIRO daily fetal movement counts      Radha Caceres MD, [address] [facility]  Maternal Fetal Medicine

## 2023-06-30 NOTE — PROGRESS NOTES
Non-stress Test (NST)  23  Marie Grimm  1985  Estimated Date of Delivery: 23    Reason for testing: T21, AMA  Repeat Non-Stress Test at 33w1d   @1345 patient turned to left side  @1402 ultrasound adjusted      Vitals:  Pre- Vitals    Flowsheet Row Most Recent Value   Prenatal Vitals    Blood Pressure 100/54   Weight - Scale 80 kg (176 lb 6 4 oz)     Patient verbalizes fetal movement  Patient is performing daily kick counts  Patient denies ALL:               Leaking of fluid   Contractions   Vaginal bleeding   Decreased fetal movement    Patient verbalized understanding  Patient has no questions or concerns  NST strip reviewed by Dr Sheila Jefferson, RN

## 2023-06-30 NOTE — LETTER
NST sleeve cover sheet    Patient name: Ema Gonzalez  : 1985  MRN: [id number]    LEVAR: Estimated Date of Delivery: 23    Obstetrician: _________[facility]_    Reason(s) for testing:  _______________T21, AMA_____________________      Testing frequency:    ___ 2x/wk  ___ 1x/wk  ___ Dopplers  ___ BPP?       Last growth scan: __________________________________________

## 2023-07-03 ENCOUNTER — ROUTINE PRENATAL (OUTPATIENT)
Dept: OBGYN CLINIC | Facility: CLINIC | Age: 38
End: 2023-07-03
Payer: COMMERCIAL

## 2023-07-03 VITALS — WEIGHT: 174 LBS | DIASTOLIC BLOOD PRESSURE: 62 MMHG | BODY MASS INDEX: 28.08 KG/M2 | SYSTOLIC BLOOD PRESSURE: 104 MMHG

## 2023-07-03 DIAGNOSIS — Z64.1 GRAND MULTIPARITY: ICD-10-CM

## 2023-07-03 DIAGNOSIS — O35.13X0 FETAL TRISOMY 21 AFFECTING CARE OF MOTHER, ANTEPARTUM, SINGLE OR UNSPECIFIED FETUS: Primary | ICD-10-CM

## 2023-07-03 DIAGNOSIS — Z3A.33 33 WEEKS GESTATION OF PREGNANCY: ICD-10-CM

## 2023-07-03 DIAGNOSIS — O36.8130 DECREASED FETAL MOVEMENTS IN THIRD TRIMESTER, SINGLE OR UNSPECIFIED FETUS: ICD-10-CM

## 2023-07-03 DIAGNOSIS — Z86.2 HISTORY OF THROMBOCYTOPENIA: ICD-10-CM

## 2023-07-03 DIAGNOSIS — O09.523 MULTIGRAVIDA OF ADVANCED MATERNAL AGE IN THIRD TRIMESTER: ICD-10-CM

## 2023-07-03 PROCEDURE — 59025 FETAL NON-STRESS TEST: CPT | Performed by: PHYSICIAN ASSISTANT

## 2023-07-03 PROCEDURE — PNV: Performed by: PHYSICIAN ASSISTANT

## 2023-07-03 PROCEDURE — 76815 OB US LIMITED FETUS(S): CPT | Performed by: PHYSICIAN ASSISTANT

## 2023-07-03 NOTE — PROGRESS NOTES
Pt is here for her 34w prenatal. nst due today. Pt denies any swelling,leakage,bleeding,pressure, and contractions. Pt states she felt like baby was moving less over the weekend. Pt doing well no concerns.  Urine dip is neg/neg

## 2023-07-03 NOTE — PROGRESS NOTES
Patient is a 35 YO [id number] female presenting to the office at 33.4 weeks for routine OB care. BP: 104/62  TWlb  Fetal Movement: decreased over the weekend  Feeling well today  Denies LOF, CTX, VB  Reports decreased FM over the weekend, did not call.    NST today reactive and reassuring  CASIMIRO 12.9cm, vertex presentation confirmed via US  Reviewed kick counts  Patient to call with any concerns or changes in FM  Has NST/CASIMIRO/Growth scan with MFM on Thursday  Feeling improvement on the Zoloft 25 mg  CBC and Iron panel ordered  Reviewed precautions  Call for concerns  RTO 1 week

## 2023-07-06 ENCOUNTER — ULTRASOUND (OUTPATIENT)
Facility: HOSPITAL | Age: 38
End: 2023-07-06
Payer: COMMERCIAL

## 2023-07-06 VITALS
BODY MASS INDEX: 28.25 KG/M2 | SYSTOLIC BLOOD PRESSURE: 112 MMHG | WEIGHT: 175.8 LBS | DIASTOLIC BLOOD PRESSURE: 62 MMHG | HEART RATE: 86 BPM | HEIGHT: 66 IN

## 2023-07-06 DIAGNOSIS — O35.13X0 FETAL TRISOMY 21 AFFECTING CARE OF MOTHER, ANTEPARTUM, SINGLE OR UNSPECIFIED FETUS: Primary | ICD-10-CM

## 2023-07-06 DIAGNOSIS — Z36.89 ENCOUNTER FOR ULTRASOUND TO CHECK FETAL GROWTH: ICD-10-CM

## 2023-07-06 DIAGNOSIS — Z3A.34 34 WEEKS GESTATION OF PREGNANCY: ICD-10-CM

## 2023-07-06 DIAGNOSIS — O09.523 MULTIGRAVIDA OF ADVANCED MATERNAL AGE IN THIRD TRIMESTER: ICD-10-CM

## 2023-07-06 PROCEDURE — 59025 FETAL NON-STRESS TEST: CPT | Performed by: OBSTETRICS & GYNECOLOGY

## 2023-07-06 PROCEDURE — 99214 OFFICE O/P EST MOD 30 MIN: CPT | Performed by: OBSTETRICS & GYNECOLOGY

## 2023-07-06 PROCEDURE — 76816 OB US FOLLOW-UP PER FETUS: CPT | Performed by: OBSTETRICS & GYNECOLOGY

## 2023-07-06 NOTE — LETTER
NST sleeve cover sheet    Patient name: Ahmet Pulido  : 1985  MRN: [id number]    LEVAR: Estimated Date of Delivery: 23    Obstetrician: [facility]    Reason(s) for testing:  AMA, Trisomy 21  __________________________________________      Testing frequency:    ___ 2x/wk  ___ 1x/wk  ___ Dopplers  ___ BPP?       Last growth scan: __________________________________________

## 2023-07-06 NOTE — PROGRESS NOTES
Repeat Non-Stress Testing:    Patient verbalizes +FM. Pt denies ALL:               Leaking of fluid   Contractions   Vaginal bleeding   Decreased fetal movement    Patient is performing daily kick counts. Patient has no questions or concerns. NST strip reviewed by Dr. Aneta Rausch.

## 2023-07-06 NOTE — PATIENT INSTRUCTIONS
Kick Counts in Pregnancy   Kick counts  measure how much your baby is moving in your womb. A kick from your baby can be felt as a twist, turn, swish, roll, or jab. It is common to feel your baby kicking at 26 to 28 weeks of pregnancy. You may feel your baby kick as early as 20 weeks of pregnancy. Seek care immediately if:   You feel your baby kick less as the day goes on. You do not feel any kicks in a day. Contact your healthcare provider if:   You feel a change in the number of kicks or movements of your baby. You feel fewer than 10 kicks within 2 hours after counting twice. You have questions or concerns about your baby's movements. Why measure kick counts:  Your baby's movement may provide information about your baby's health. They may move less, or not at all, if there are problems. They may move less if he does not have enough room to grow in your uterus (womb). They may also move less if he is not getting enough oxygen or nutrition from the placenta. Tell your healthcare provider as soon as you feel a change in your baby's movements. Problems that are found earlier are easier to treat. When to measure kick counts:   Measure kick counts at the same time every day. Measure kick counts when your baby is awake and most active. Your baby may be most active in the evening. Measure kick counts after a meal or snack. Your baby may be more active after you eat. Wait 2 hours after you drink liquids that contain caffeine. Caffeine can make your baby more active than usual.  You should not smoke while you are pregnant. Smoking increases the risk of health problems for you and for your baby during your pregnancy. If you do smoke, wait 2 hours to measure kick counts. Nicotine can make your baby more active than usual.    How to measure kick counts:  Check that your baby is awake before you measure kick counts.  You can wake up your baby by lightly pushing on your belly, walking, or drinking something cold. Your healthcare provider may tell you different ways to measure kick counts. He may tell you to do the following:  Use a chart or clock to keep track of the time you start and finish counting. Sit in a chair or lie on your left side. Place your hands on the largest part of your belly. Count until you reach 10 kicks. Write down how much time it takes to count 10 kicks. It may take 30 minutes to 2 hours to count 10 kicks. It should not take more than 2 hours to count 10 kicks. If you do not feel 10 kicks within 2 hours, wait 1 hour and count again. Your baby can sleep for up to 40 minutes at one time.

## 2023-07-11 ENCOUNTER — HOSPITAL ENCOUNTER (INPATIENT)
Facility: HOSPITAL | Age: 38
LOS: 4 days | Discharge: HOME | End: 2023-07-15
Attending: OBSTETRICS & GYNECOLOGY | Admitting: OBSTETRICS & GYNECOLOGY
Payer: COMMERCIAL

## 2023-07-11 ENCOUNTER — ANESTHESIA EVENT (OUTPATIENT)
Dept: LABOR AND DELIVERY | Facility: HOSPITAL | Age: 38
End: 2023-07-11
Payer: COMMERCIAL

## 2023-07-11 ENCOUNTER — ROUTINE PRENATAL (OUTPATIENT)
Facility: HOSPITAL | Age: 38
End: 2023-07-11
Payer: COMMERCIAL

## 2023-07-11 ENCOUNTER — ANESTHESIA (OUTPATIENT)
Dept: LABOR AND DELIVERY | Facility: HOSPITAL | Age: 38
End: 2023-07-11
Payer: COMMERCIAL

## 2023-07-11 VITALS
SYSTOLIC BLOOD PRESSURE: 110 MMHG | WEIGHT: 176 LBS | DIASTOLIC BLOOD PRESSURE: 58 MMHG | HEART RATE: 87 BPM | BODY MASS INDEX: 28.28 KG/M2 | HEIGHT: 66 IN

## 2023-07-11 DIAGNOSIS — O09.523 MULTIGRAVIDA OF ADVANCED MATERNAL AGE IN THIRD TRIMESTER: ICD-10-CM

## 2023-07-11 DIAGNOSIS — O36.8390 NON-REASSURING FETAL HEART RATE WITH LATE DECELERATION: Primary | ICD-10-CM

## 2023-07-11 DIAGNOSIS — O35.13X0 FETAL TRISOMY 21 AFFECTING CARE OF MOTHER, ANTEPARTUM, SINGLE OR UNSPECIFIED FETUS: ICD-10-CM

## 2023-07-11 DIAGNOSIS — O28.8 NST (NON-STRESS TEST) NONREACTIVE: ICD-10-CM

## 2023-07-11 DIAGNOSIS — Z98.891 STATUS POST PRIMARY LOW TRANSVERSE CESAREAN SECTION: Primary | ICD-10-CM

## 2023-07-11 DIAGNOSIS — Z3A.34 34 WEEKS GESTATION OF PREGNANCY: ICD-10-CM

## 2023-07-11 PROBLEM — O28.5 ABNORMAL GENETIC TEST DURING PREGNANCY: Status: RESOLVED | Noted: 2023-02-23 | Resolved: 2023-07-11

## 2023-07-11 LAB
ABO GROUP BLD: NORMAL
BACTERIA UR QL AUTO: ABNORMAL /HPF
BASE EXCESS BLDCOA CALC-SCNC: -0.8 MMOL/L (ref 3–11)
BASE EXCESS BLDCOV CALC-SCNC: -0.4 MMOL/L (ref 1–9)
BILIRUB UR QL STRIP: NEGATIVE
BLD GP AB SCN SERPL QL: POSITIVE
BLOOD GROUP ANTIBODIES SERPL: NORMAL
CLARITY UR: CLEAR
COLOR UR: ABNORMAL
ERYTHROCYTE [DISTWIDTH] IN BLOOD BY AUTOMATED COUNT: 15.2 % (ref 11.6–15.1)
GLUCOSE UR STRIP-MCNC: NEGATIVE MG/DL
HCO3 BLDCOA-SCNC: 28.9 MMOL/L (ref 17.3–27.3)
HCO3 BLDCOV-SCNC: 24.5 MMOL/L (ref 12.2–28.6)
HCT VFR BLD AUTO: 38.7 % (ref 34.8–46.1)
HGB BLD-MCNC: 12.9 G/DL (ref 11.5–15.4)
HGB UR QL STRIP.AUTO: NEGATIVE
HOLD SPECIMEN: NORMAL
KETONES UR STRIP-MCNC: NEGATIVE MG/DL
LEUKOCYTE ESTERASE UR QL STRIP: ABNORMAL
MCH RBC QN AUTO: 30.9 PG (ref 26.8–34.3)
MCHC RBC AUTO-ENTMCNC: 33.3 G/DL (ref 31.4–37.4)
MCV RBC AUTO: 93 FL (ref 82–98)
NITRITE UR QL STRIP: NEGATIVE
NON-SQ EPI CELLS URNS QL MICRO: ABNORMAL /HPF
O2 CT VFR BLDCOA CALC: 3.9 ML/DL
OXYHGB MFR BLDCOA: 14.4 %
OXYHGB MFR BLDCOV: 62.5 %
PCO2 BLDCOA: 66.6 MM[HG] (ref 30–60)
PCO2 BLDCOV: 41.2 MM HG (ref 27–43)
PH BLDCOA: 7.25 [PH] (ref 7.23–7.43)
PH BLDCOV: 7.39 [PH] (ref 7.19–7.49)
PH UR STRIP.AUTO: 7.5 [PH]
PLATELET # BLD AUTO: 117 THOUSANDS/UL (ref 149–390)
PMV BLD AUTO: 11.2 FL (ref 8.9–12.7)
PO2 BLDCOA: 10 MM HG (ref 5–25)
PO2 BLDCOV: 25.3 MM HG (ref 15–45)
PROT UR STRIP-MCNC: NEGATIVE MG/DL
RBC # BLD AUTO: 4.17 MILLION/UL (ref 3.81–5.12)
RBC #/AREA URNS AUTO: ABNORMAL /HPF
RH BLD: NEGATIVE
SAO2 % BLDCOV: 17.1 ML/DL
SP GR UR STRIP.AUTO: 1.01 (ref 1–1.03)
SPECIMEN EXPIRATION DATE: NORMAL
TREPONEMA PALLIDUM IGG+IGM AB [PRESENCE] IN SERUM OR PLASMA BY IMMUNOASSAY: NORMAL
UROBILINOGEN UR STRIP-ACNC: <2 MG/DL
WBC # BLD AUTO: 7.73 THOUSAND/UL (ref 4.31–10.16)
WBC #/AREA URNS AUTO: ABNORMAL /HPF

## 2023-07-11 PROCEDURE — 86850 RBC ANTIBODY SCREEN: CPT

## 2023-07-11 PROCEDURE — 59025 FETAL NON-STRESS TEST: CPT | Performed by: OBSTETRICS & GYNECOLOGY

## 2023-07-11 PROCEDURE — 85027 COMPLETE CBC AUTOMATED: CPT

## 2023-07-11 PROCEDURE — 4A1HXCZ MONITORING OF PRODUCTS OF CONCEPTION, CARDIAC RATE, EXTERNAL APPROACH: ICD-10-PCS | Performed by: OBSTETRICS & GYNECOLOGY

## 2023-07-11 PROCEDURE — 82805 BLOOD GASES W/O2 SATURATION: CPT | Performed by: OBSTETRICS & GYNECOLOGY

## 2023-07-11 PROCEDURE — 81001 URINALYSIS AUTO W/SCOPE: CPT

## 2023-07-11 PROCEDURE — 88307 TISSUE EXAM BY PATHOLOGIST: CPT | Performed by: PATHOLOGY

## 2023-07-11 PROCEDURE — 76818 FETAL BIOPHYS PROFILE W/NST: CPT | Performed by: OBSTETRICS & GYNECOLOGY

## 2023-07-11 PROCEDURE — 87150 DNA/RNA AMPLIFIED PROBE: CPT | Performed by: OBSTETRICS & GYNECOLOGY

## 2023-07-11 PROCEDURE — 99214 OFFICE O/P EST MOD 30 MIN: CPT

## 2023-07-11 PROCEDURE — 86900 BLOOD TYPING SEROLOGIC ABO: CPT

## 2023-07-11 PROCEDURE — 86780 TREPONEMA PALLIDUM: CPT

## 2023-07-11 PROCEDURE — 86901 BLOOD TYPING SEROLOGIC RH(D): CPT

## 2023-07-11 PROCEDURE — 99223 1ST HOSP IP/OBS HIGH 75: CPT | Performed by: OBSTETRICS & GYNECOLOGY

## 2023-07-11 PROCEDURE — NC001 PR NO CHARGE: Performed by: OBSTETRICS & GYNECOLOGY

## 2023-07-11 PROCEDURE — 86870 RBC ANTIBODY IDENTIFICATION: CPT | Performed by: OBSTETRICS & GYNECOLOGY

## 2023-07-11 PROCEDURE — 59510 CESAREAN DELIVERY: CPT | Performed by: OBSTETRICS & GYNECOLOGY

## 2023-07-11 RX ORDER — FENTANYL CITRATE 50 UG/ML
50 INJECTION, SOLUTION INTRAMUSCULAR; INTRAVENOUS
Status: ACTIVE | OUTPATIENT
Start: 2023-07-11 | End: 2023-07-12

## 2023-07-11 RX ORDER — FENTANYL CITRATE 50 UG/ML
INJECTION, SOLUTION INTRAMUSCULAR; INTRAVENOUS AS NEEDED
Status: DISCONTINUED | OUTPATIENT
Start: 2023-07-11 | End: 2023-07-11

## 2023-07-11 RX ORDER — ACETAMINOPHEN 325 MG/1
650 TABLET ORAL EVERY 6 HOURS SCHEDULED
Status: DISCONTINUED | OUTPATIENT
Start: 2023-07-11 | End: 2023-07-15 | Stop reason: HOSPADM

## 2023-07-11 RX ORDER — IBUPROFEN 600 MG/1
600 TABLET ORAL EVERY 6 HOURS SCHEDULED
Status: DISCONTINUED | OUTPATIENT
Start: 2023-07-12 | End: 2023-07-11

## 2023-07-11 RX ORDER — OXYTOCIN/RINGER'S LACTATE 30/500 ML
PLASTIC BAG, INJECTION (ML) INTRAVENOUS CONTINUOUS PRN
Status: DISCONTINUED | OUTPATIENT
Start: 2023-07-11 | End: 2023-07-11

## 2023-07-11 RX ORDER — LORATADINE 10 MG/1
10 TABLET ORAL DAILY
Status: DISCONTINUED | OUTPATIENT
Start: 2023-07-11 | End: 2023-07-11

## 2023-07-11 RX ORDER — CALCIUM CARBONATE 500 MG/1
1000 TABLET, CHEWABLE ORAL 3 TIMES DAILY PRN
Status: DISCONTINUED | OUTPATIENT
Start: 2023-07-11 | End: 2023-07-15 | Stop reason: HOSPADM

## 2023-07-11 RX ORDER — BETAMETHASONE SODIUM PHOSPHATE AND BETAMETHASONE ACETATE 3; 3 MG/ML; MG/ML
12 INJECTION, SUSPENSION INTRA-ARTICULAR; INTRALESIONAL; INTRAMUSCULAR; SOFT TISSUE EVERY 24 HOURS
Status: DISCONTINUED | OUTPATIENT
Start: 2023-07-11 | End: 2023-07-11

## 2023-07-11 RX ORDER — HYDROMORPHONE HCL/PF 1 MG/ML
0.5 SYRINGE (ML) INJECTION EVERY 2 HOUR PRN
Status: DISCONTINUED | OUTPATIENT
Start: 2023-07-11 | End: 2023-07-15 | Stop reason: HOSPADM

## 2023-07-11 RX ORDER — DIPHENHYDRAMINE HYDROCHLORIDE 50 MG/ML
25 INJECTION INTRAMUSCULAR; INTRAVENOUS EVERY 6 HOURS PRN
Status: DISCONTINUED | OUTPATIENT
Start: 2023-07-11 | End: 2023-07-15 | Stop reason: HOSPADM

## 2023-07-11 RX ORDER — SERTRALINE HYDROCHLORIDE 25 MG/1
25 TABLET, FILM COATED ORAL DAILY
Status: DISCONTINUED | OUTPATIENT
Start: 2023-07-11 | End: 2023-07-15 | Stop reason: HOSPADM

## 2023-07-11 RX ORDER — DIAPER,BRIEF,INFANT-TODD,DISP
1 EACH MISCELLANEOUS DAILY PRN
Status: DISCONTINUED | OUTPATIENT
Start: 2023-07-11 | End: 2023-07-15 | Stop reason: HOSPADM

## 2023-07-11 RX ORDER — ACETAMINOPHEN 325 MG/1
650 TABLET ORAL EVERY 6 HOURS SCHEDULED
Status: DISCONTINUED | OUTPATIENT
Start: 2023-07-12 | End: 2023-07-11

## 2023-07-11 RX ORDER — CALCIUM CARBONATE 500 MG/1
1000 TABLET, CHEWABLE ORAL DAILY PRN
Status: DISCONTINUED | OUTPATIENT
Start: 2023-07-11 | End: 2023-07-11

## 2023-07-11 RX ORDER — ENOXAPARIN SODIUM 100 MG/ML
40 INJECTION SUBCUTANEOUS DAILY
Status: DISCONTINUED | OUTPATIENT
Start: 2023-07-12 | End: 2023-07-15 | Stop reason: HOSPADM

## 2023-07-11 RX ORDER — SIMETHICONE 80 MG
80 TABLET,CHEWABLE ORAL 4 TIMES DAILY PRN
Status: DISCONTINUED | OUTPATIENT
Start: 2023-07-11 | End: 2023-07-15 | Stop reason: HOSPADM

## 2023-07-11 RX ORDER — ONDANSETRON 2 MG/ML
4 INJECTION INTRAMUSCULAR; INTRAVENOUS EVERY 8 HOURS PRN
Status: DISCONTINUED | OUTPATIENT
Start: 2023-07-11 | End: 2023-07-11

## 2023-07-11 RX ORDER — SODIUM CHLORIDE, SODIUM LACTATE, POTASSIUM CHLORIDE, CALCIUM CHLORIDE 600; 310; 30; 20 MG/100ML; MG/100ML; MG/100ML; MG/100ML
125 INJECTION, SOLUTION INTRAVENOUS CONTINUOUS
Status: DISCONTINUED | OUTPATIENT
Start: 2023-07-11 | End: 2023-07-15 | Stop reason: HOSPADM

## 2023-07-11 RX ORDER — KETOROLAC TROMETHAMINE 30 MG/ML
15 INJECTION, SOLUTION INTRAMUSCULAR; INTRAVENOUS EVERY 6 HOURS SCHEDULED
Status: DISPENSED | OUTPATIENT
Start: 2023-07-12 | End: 2023-07-12

## 2023-07-11 RX ORDER — NALBUPHINE HYDROCHLORIDE 10 MG/ML
3 INJECTION, SOLUTION INTRAMUSCULAR; INTRAVENOUS; SUBCUTANEOUS
Status: ACTIVE | OUTPATIENT
Start: 2023-07-11 | End: 2023-07-12

## 2023-07-11 RX ORDER — OXYCODONE HYDROCHLORIDE 5 MG/1
5 TABLET ORAL EVERY 4 HOURS PRN
Status: DISCONTINUED | OUTPATIENT
Start: 2023-07-12 | End: 2023-07-15 | Stop reason: HOSPADM

## 2023-07-11 RX ORDER — DEXAMETHASONE SODIUM PHOSPHATE 10 MG/ML
INJECTION, SOLUTION INTRAMUSCULAR; INTRAVENOUS AS NEEDED
Status: DISCONTINUED | OUTPATIENT
Start: 2023-07-11 | End: 2023-07-11

## 2023-07-11 RX ORDER — OXYTOCIN/RINGER'S LACTATE 30/500 ML
62.5 PLASTIC BAG, INJECTION (ML) INTRAVENOUS ONCE
Status: COMPLETED | OUTPATIENT
Start: 2023-07-11 | End: 2023-07-12

## 2023-07-11 RX ORDER — SODIUM CHLORIDE, SODIUM LACTATE, POTASSIUM CHLORIDE, CALCIUM CHLORIDE 600; 310; 30; 20 MG/100ML; MG/100ML; MG/100ML; MG/100ML
125 INJECTION, SOLUTION INTRAVENOUS CONTINUOUS
Status: DISCONTINUED | OUTPATIENT
Start: 2023-07-11 | End: 2023-07-11

## 2023-07-11 RX ORDER — SIMETHICONE 80 MG
80 TABLET,CHEWABLE ORAL 4 TIMES DAILY PRN
Status: DISCONTINUED | OUTPATIENT
Start: 2023-07-11 | End: 2023-07-11

## 2023-07-11 RX ORDER — ONDANSETRON 2 MG/ML
INJECTION INTRAMUSCULAR; INTRAVENOUS AS NEEDED
Status: DISCONTINUED | OUTPATIENT
Start: 2023-07-11 | End: 2023-07-11

## 2023-07-11 RX ORDER — MORPHINE SULFATE 0.5 MG/ML
INJECTION, SOLUTION EPIDURAL; INTRATHECAL; INTRAVENOUS AS NEEDED
Status: DISCONTINUED | OUTPATIENT
Start: 2023-07-11 | End: 2023-07-11

## 2023-07-11 RX ORDER — DOCUSATE SODIUM 100 MG/1
100 CAPSULE, LIQUID FILLED ORAL 2 TIMES DAILY
Status: DISCONTINUED | OUTPATIENT
Start: 2023-07-11 | End: 2023-07-15 | Stop reason: HOSPADM

## 2023-07-11 RX ORDER — CLINDAMYCIN PHOSPHATE 900 MG/50ML
900 INJECTION INTRAVENOUS ONCE
Status: COMPLETED | OUTPATIENT
Start: 2023-07-11 | End: 2023-07-11

## 2023-07-11 RX ORDER — NALOXONE HYDROCHLORIDE 0.4 MG/ML
0.1 INJECTION, SOLUTION INTRAMUSCULAR; INTRAVENOUS; SUBCUTANEOUS
Status: ACTIVE | OUTPATIENT
Start: 2023-07-11 | End: 2023-07-12

## 2023-07-11 RX ORDER — ACETAMINOPHEN 325 MG/1
650 TABLET ORAL EVERY 4 HOURS PRN
Status: DISCONTINUED | OUTPATIENT
Start: 2023-07-11 | End: 2023-07-11

## 2023-07-11 RX ORDER — HYDROXYZINE HYDROCHLORIDE 25 MG/1
25 TABLET, FILM COATED ORAL EVERY 6 HOURS PRN
Status: DISCONTINUED | OUTPATIENT
Start: 2023-07-11 | End: 2023-07-15 | Stop reason: HOSPADM

## 2023-07-11 RX ORDER — BUPIVACAINE HYDROCHLORIDE 7.5 MG/ML
INJECTION, SOLUTION INTRASPINAL AS NEEDED
Status: DISCONTINUED | OUTPATIENT
Start: 2023-07-11 | End: 2023-07-11

## 2023-07-11 RX ORDER — ONDANSETRON 2 MG/ML
4 INJECTION INTRAMUSCULAR; INTRAVENOUS EVERY 6 HOURS PRN
Status: DISPENSED | OUTPATIENT
Start: 2023-07-11 | End: 2023-07-12

## 2023-07-11 RX ORDER — IBUPROFEN 600 MG/1
600 TABLET ORAL EVERY 6 HOURS SCHEDULED
Status: DISCONTINUED | OUTPATIENT
Start: 2023-07-13 | End: 2023-07-15 | Stop reason: HOSPADM

## 2023-07-11 RX ORDER — KETOROLAC TROMETHAMINE 30 MG/ML
INJECTION, SOLUTION INTRAMUSCULAR; INTRAVENOUS AS NEEDED
Status: DISCONTINUED | OUTPATIENT
Start: 2023-07-11 | End: 2023-07-11

## 2023-07-11 RX ORDER — OXYCODONE HYDROCHLORIDE 10 MG/1
10 TABLET ORAL EVERY 4 HOURS PRN
Status: DISCONTINUED | OUTPATIENT
Start: 2023-07-12 | End: 2023-07-15 | Stop reason: HOSPADM

## 2023-07-11 RX ADMIN — KETOROLAC TROMETHAMINE 30 MG: 30 INJECTION, SOLUTION INTRAMUSCULAR; INTRAVENOUS at 19:31

## 2023-07-11 RX ADMIN — Medication 62.5 MILLI-UNITS/MIN: at 22:14

## 2023-07-11 RX ADMIN — DOCUSATE SODIUM 100 MG: 100 CAPSULE, LIQUID FILLED ORAL at 21:19

## 2023-07-11 RX ADMIN — NALBUPHINE HYDROCHLORIDE 3 MG: 10 INJECTION, SOLUTION INTRAMUSCULAR; INTRAVENOUS; SUBCUTANEOUS at 21:18

## 2023-07-11 RX ADMIN — MORPHINE SULFATE 0.15 MG: 0.5 INJECTION, SOLUTION EPIDURAL; INTRATHECAL; INTRAVENOUS at 19:09

## 2023-07-11 RX ADMIN — DEXAMETHASONE SODIUM PHOSPHATE 10 MG: 10 INJECTION, SOLUTION INTRAMUSCULAR; INTRAVENOUS at 19:31

## 2023-07-11 RX ADMIN — SODIUM CHLORIDE, SODIUM LACTATE, POTASSIUM CHLORIDE, AND CALCIUM CHLORIDE 125 ML/HR: .6; .31; .03; .02 INJECTION, SOLUTION INTRAVENOUS at 18:33

## 2023-07-11 RX ADMIN — ACETAMINOPHEN 650 MG: 325 TABLET, FILM COATED ORAL at 18:15

## 2023-07-11 RX ADMIN — BETAMETHASONE SODIUM PHOSPHATE AND BETAMETHASONE ACETATE 12 MG: 3; 3 INJECTION, SUSPENSION INTRA-ARTICULAR; INTRALESIONAL; INTRAMUSCULAR at 15:52

## 2023-07-11 RX ADMIN — SODIUM CHLORIDE, SODIUM LACTATE, POTASSIUM CHLORIDE, AND CALCIUM CHLORIDE 1000 ML: .6; .31; .03; .02 INJECTION, SOLUTION INTRAVENOUS at 15:25

## 2023-07-11 RX ADMIN — CLINDAMYCIN PHOSPHATE 900 MG: 900 INJECTION, SOLUTION INTRAVENOUS at 19:12

## 2023-07-11 RX ADMIN — SODIUM CHLORIDE, SODIUM LACTATE, POTASSIUM CHLORIDE, AND CALCIUM CHLORIDE 125 ML/HR: .6; .31; .03; .02 INJECTION, SOLUTION INTRAVENOUS at 23:44

## 2023-07-11 RX ADMIN — SODIUM CHLORIDE, SODIUM LACTATE, POTASSIUM CHLORIDE, AND CALCIUM CHLORIDE: .6; .31; .03; .02 INJECTION, SOLUTION INTRAVENOUS at 19:49

## 2023-07-11 RX ADMIN — SODIUM CHLORIDE, SODIUM LACTATE, POTASSIUM CHLORIDE, AND CALCIUM CHLORIDE 125 ML/HR: .6; .31; .03; .02 INJECTION, SOLUTION INTRAVENOUS at 20:50

## 2023-07-11 RX ADMIN — GENTAMICIN SULFATE 336 MG: 40 INJECTION, SOLUTION INTRAMUSCULAR; INTRAVENOUS at 19:19

## 2023-07-11 RX ADMIN — ONDANSETRON 4 MG: 2 INJECTION INTRAMUSCULAR; INTRAVENOUS at 22:56

## 2023-07-11 RX ADMIN — BUPIVACAINE HYDROCHLORIDE IN DEXTROSE 1.6 ML: 7.5 INJECTION, SOLUTION SUBARACHNOID at 19:09

## 2023-07-11 RX ADMIN — Medication 250 MILLI-UNITS/MIN: at 19:25

## 2023-07-11 RX ADMIN — KETOROLAC TROMETHAMINE 15 MG: 30 INJECTION, SOLUTION INTRAMUSCULAR; INTRAVENOUS at 23:40

## 2023-07-11 RX ADMIN — PHENYLEPHRINE HYDROCHLORIDE 80 MCG/MIN: 10 INJECTION, SOLUTION INTRAVENOUS at 19:10

## 2023-07-11 RX ADMIN — FENTANYL CITRATE 15 MCG: 50 INJECTION INTRAMUSCULAR; INTRAVENOUS at 19:09

## 2023-07-11 RX ADMIN — ONDANSETRON 4 MG: 2 INJECTION INTRAMUSCULAR; INTRAVENOUS at 19:11

## 2023-07-11 NOTE — PROGRESS NOTES
Called by RN to come to the room due to prolonged deceleration. Patient is currently admitted for monitoring after non-reactive NST with BPP 6/10. This is now the second prolonged deceleration she has had. This lasted for 5 minutes. Did resolve with repositioning. Patient is remote from delivery. In the setting of non-reassuring fetal status and remote from delivery, will proceed with 1LTCS. Antibiotics ordered.    Anesthesia, NICU and L&D charge aware     Dr. Jennifer Hayes at the bedside for evaluation    Marisol Gardiner MD  OBGYN PGY-4  6:30 PM  7/11/2023

## 2023-07-11 NOTE — PROCEDURES
Arnel Goldstein, chris [id number] at 34w5d with an LEVAR of 8/17/2023, by Last Menstrual Period, was seen at [address] [place] [address] for the following procedure(s): $Procedure Type: BPP with NST]    Nonstress Test  Variability: Moderate  Decelerations: None  Accelerations: Yes  Acoustic Stimulator: No  Baseline: 130 BPM  Uterine Irritability: No  Contractions: Not present         Biophysical Profile  Fetal Breathing: (!) 0  Fetal Movement: 2  Fetal Tone: (!) 0  Fluid Volume: 2  Nonstress Test: 2  Biophysical Profile Score (of 8): 4 /8  Biophysical Profile Score (of 10): 6 /10    Ultrasound Other  Fetal Presentation: Vertex    Interpretation  Nonstress Test Interpretation: Reactive    CASIMIRO  - Q1 2.6  - Q2 6.35  - Q3 1.9  - Q4 4.34    Chidi Villarreal MD  OB/GYN PGY-4  5:11 PM  07/11/23

## 2023-07-11 NOTE — LETTER
July 11, 2023     [address] [facility],[address], [place]  [address] [facility]. [address]  [address] [address]    Patient: Ashok Akhtar   YOB: 1985   Date of Visit: 7/11/2023       Dear Dr. June Smith:    Thank you for referring Karen Perez to me for evaluation. Below are my notes for this consultation. If you have questions, please do not hesitate to call me. I look forward to following your patient along with you.          Sincerely,        Jessi Barrientos MD        CC: No Recipients

## 2023-07-11 NOTE — ANESTHESIA PREPROCEDURE EVALUATION
Procedure:   SECTION () (Uterus)    Relevant Problems   ANESTHESIA   (+) PONV (postoperative nausea and vomiting)      CARDIO   (+) Migraine      GYN   (+) 34 weeks gestation of pregnancy   (+) Multigravida of advanced maternal age in third trimester      HEMATOLOGY   (+) Gestational thrombocytopenia (HCC)   (+) Iron deficiency anemia during pregnancy      NEURO/PSYCH   (+) Anxiety   (+) Chronic tension headaches   (+) Migraine        Physical Exam    Airway      TM Distance: >3 FB  Neck ROM: full     Dental   No notable dental hx     Cardiovascular      Pulmonary      Other Findings        Anesthesia Plan  ASA Score- 2     Anesthesia Type- spinal with ASA Monitors. Additional Monitors:   Airway Plan:     Comment: Pt brought to OR urgently without obtaining consent prior. As pt already in OR, formal consent not signed. Discussed spinal, pt verbalized understanding and agreed to proceed forward. .       Plan Factors-    Chart reviewed. Existing labs reviewed. Patient summary reviewed. Induction-     Postoperative Plan- Plan for postoperative opioid use. Informed Consent- Anesthetic plan and risks discussed with patient. I personally reviewed this patient with the CRNA. Discussed and agreed on the Anesthesia Plan with the CRNA. Caden Powell

## 2023-07-11 NOTE — PROGRESS NOTES
L&D Triage Note - OB/GYN  Jameel Silvestre 34 y.o. female MRN: [id number]  Unit/Bed#: [facility] Encounter: [id number]    Patient is seen by Barak Fair is a 34 y.o. [id number] at 34w5d here for DFM and a deceleration on NST in .     PLAN  #1. DFM:   · NST nonreactive with baseline of 130 Moderate veairiability no accels  · Encourage drinking cold fluids and eating  · BPP    Discharge instructions  · Patient instructed to call if experiencing worsening contractions, vaginal bleeding, loss of fluid or decreased fetal movement. · Will follow up with OBGYN on . D/w Dr. Macdonald Jobs  ______________    SUBJECTIVE    LEVAR: Estimated Date of Delivery: 23    HPI:  34 y.o. [id number] 34w5d presents with complaint of a deceration at  NST. And was sent for further monitoring. Patient reports decreased fetal movement overnight which has since resolved. Denies chest pain, SOB, abdominal pain, dysuria, hematuria, or lower extremity pain/swelling. Contractions: irregular "[place]ick's"  Leakage of fluid: None  Vaginal Bleeding: None  Fetal movement: present    Her obstetrical history is significant for iron deficiency anemia during pregnancy requiring iron transfusions, AMA, grand multiparity. ROS:  Constitutional: Negative  Respiratory: Negative  Cardiovascular: Negative    Gastrointestinal: Negative    OBJECTIVE:  /65 (BP Location: Right arm)   Pulse 81   Temp 98.5 °F (36.9 °C) (Oral)   Resp 18   Ht 5' 6" (1.676 m)   Wt 79.8 kg (176 lb)   LMP 11/10/2022 (Exact Date)   SpO2 99%   BMI 28.41 kg/m²   Body mass index is 28.41 kg/m².     Physical Exam    Speculum exam:  Normal external female genitalia***  Cervix fully visualized and not visibly dilated***  Physiologic discharge***  No bleeding, pooling, abnormal discharge, or lesions noted  ***    Microscopy:     Infection:   - *** clue cells    - *** hyphae   - *** trichomonads present Membrane status   - *** ferning   - *** nitrazene   - *** pooling     SVE:       FHT:  Baseline Rate: 130 bpm  Variability: Moderate 6-25 bpm  Accelerations: 15 x 15 or greater, At variable times  Decelerations: None  FHR Category: Category I    TOCO:   Contraction Frequency (minutes): 0  Contraction Quality: Not applicable    IMAGING:       TVUS   Cervical length         - *** cm         - *** cm         - *** cm   Presentation: ***        TAUS   CASIMIRO      - Q1 *** cm     - Q2 *** cm     - Q3 *** cm     - Q4 *** cm     - Total: *** cm   Placenta: ***   Presentation: ***    Labs: No results found for this or any previous visit (from the past 24 hour(s)).       Kelly Holland MD  7/11/2023  1:29 PM

## 2023-07-11 NOTE — ASSESSMENT & PLAN NOTE
BPP 6/10 - +2 NST, CASIMIRO, fetal movement  Overnight observation for continuous monitoring  Plan to repeat BPP in AM - discussed indications for delivery if equivocal or worsening fetal status   Betamethasone 7/11-7/12

## 2023-07-11 NOTE — H&P
H & P- Obstetrics   Jessica Wray 34 y.o. female MRN: [id number]  Unit/Bed#: LD TRIAGE 3-01 Encounter: [id number]    Assessment: 34 y.o. [id number] at 34w5d admitted for observation in the setting of equivocal BPP 6/10     * NST (non-stress test) nonreactive  Assessment & Plan  BPP 6/10 - +2 NST, CASIMIRO, fetal movement  Overnight observation for continuous monitoring  Plan to repeat BPP in AM - discussed indications for delivery if equivocal or worsening fetal status   Betamethasone 7/11-7/12    34 weeks gestation of pregnancy  Assessment & Plan  BTM  7/11-7/12  Continuous fetal monitoring  GBS collected, pending  NICU consult  S/p RhoGAM at 28w  FEN: regular diet  DVT ppx: SCDs, ambulation    Trisomy 21, fetal, affecting care of mother, antepartum  Assessment & Plan  NIPT Positive, confirmed via amniocentesis  Fetal echo 4/25/23 with possible AV canal defect    Iron deficiency anemia during pregnancy  Assessment & Plan  Lab Results   Component Value Date    HGB 12.9 07/11/2023         Gestational thrombocytopenia [facility])  Assessment & Plan  Lab Results   Component Value Date     (L) 07/11/2023     Continue to trend        D/w Dr. Mari Oh    Chief Complaint: sent over from the office    HPI: Jessica Wray is a 34 y.o. [id number] with an LEVAR of 8/17/2023, by Last Menstrual Period at 34w5d who is being admitted for continued observation in the setting of equivocal BPP and nonreactive NST in the office. Patient denies contractions, leaking fluid, vaginal bleeding. She states baby's movement overall has not been as active as prior weeks but is still feeling fetal movement.      Patient Active Problem List   Diagnosis   • Chronic tension headaches   • Recurrent pregnancy loss   • S/P myomectomy   • Anxiety   • PONV (postoperative nausea and vomiting)   • Migraine   • Status post hysteroscopy   • Multigravida of advanced maternal age in third trimester   • Grand multiparity   • Iron deficiency anemia during pregnancy   • Trisomy 21, fetal, affecting care of mother, antepartum   • NST (non-stress test) nonreactive       Baby complications/comments: Trisomy 21 +NIPT, confirmed by amniocentesis. Possible AV canal defect on fetal echo    Review of Systems   Constitutional: Negative for chills and fever. HENT: Negative. Eyes: Negative for visual disturbance. Respiratory: Negative for shortness of breath. Cardiovascular: Negative for chest pain. Gastrointestinal: Negative for abdominal pain, constipation, diarrhea, nausea and vomiting. Genitourinary: Negative for dysuria, hematuria, vaginal bleeding, vaginal discharge and vaginal pain. Neurological: Negative for headaches.        OB History    Para Term  AB Living   14 6 6   7 6   SAB IAB Ectopic Multiple Live Births   7     0 6      # Outcome Date GA Lbr Maldonado/2nd Weight Sex Delivery Anes PTL Lv   14 Current            13 SAB 2022 4w0d          12 SAB 2021 8w0d          11 Term 10/23/20 40w3d / 00:06 3750 g (8 lb 4.3 oz) M Vag-Spont EPI  DAVID   10 SAB 2019 10w0d    SAB      9 SAB 10/16/18 4w0d    SAB      8 SAB 18 4w0d    SAB      7 SAB 18 4w0d    SAB      6 Term 14 41w2d  4082 g (9 lb) F Vag-Spont EPI N DAVID   5 Term 12 40w4d  3884 g (8 lb 9 oz) F Vag-Spont EPI N DAVID   4 SAB 11 7w0d          3 Term 07/01/10 40w1d  3572 g (7 lb 14 oz) F Vag-Spont EPI N DAVID   2 Term 11/10/08 39w6d  3147 g (6 lb 15 oz) M Vag-Spont EPI N DAVID   1 Term 07 39w2d  3260 g (7 lb 3 oz) M Vag-Spont EPI N DAVID      Birth Comments: Augmented with pitocin      Obstetric Comments   Menarche 13        Past Medical History:   Diagnosis Date   • Allergy     seasonal allergy   • Anxiety    • Chronic endometritis    • Cold intolerance     Resolved 12/15/2015    • Migraine    • Miscarriage     x 5   • Pelvic pressure in female     Resolved 12/15/2015    • PONV (postoperative nausea and vomiting)     Patient requests to be pre-medicated for N/V prior to all surgeries. Also notes some light-headedness and slow to awake s/p anesthesia   • Recurrent pregnancy loss    • Varicella 1989    as a child   • Vomiting during pregnancy     Resolved 12/15/2015    • Wears glasses        Past Surgical History:   Procedure Laterality Date   • CONDYLOMA EXCISION/FULGURATION  May 2007    during her first pregnancy; 2017   • CYST REMOVAL     • HYSTEROSCOPY N/A 6/23/2022    Procedure: HYSTEROSCOPY W/ ENDOMETRIAL BIOPSY;  Surgeon: Diego Peng DO;  Location: [facility];  Service: Gynecology   • POLYPECTOMY N/A 6/23/2022    Procedure: POLYPECTOMY;  Surgeon: Diego Peng DO;  Location: [facility];  Service: Gynecology   • POPLITEAL SYNOVIAL CYST EXCISION      Resolved 8/2007    • ID HYSTEROSCOPY BX ENDOMETRIUM&/POLYPC W/WO D&C N/A 10/21/2019    Procedure: HYSTEROSCOPY; BIOPSY (Jacky Ragsdale); polypectomy;  Surgeon: Diego Peng DO;  [facility];  Service: Gynecology   • WISDOM TOOTH EXTRACTION         Social History     Tobacco Use   • Smoking status: Never   • Smokeless tobacco: Never   Substance Use Topics   • Alcohol use: Not Currently       Allergies   Allergen Reactions   • Cefaclor Hives       Medications Prior to Admission   Medication   • cetirizine (ZyrTEC) 10 mg tablet   • Prenatal Vit-Fe Fumarate-FA (PRENATAL 1 PLUS 1 PO)   • sertraline (ZOLOFT) 25 mg tablet   • fluocinolone acetonide (DERMOTIC) 0.01 % otic oil   • prochlorperazine (COMPAZINE) 10 mg tablet       Objective:  Temp:  [98.5 °F (36.9 °C)] 98.5 °F (36.9 °C)  HR:  [81-87] 81  Resp:  [18] 18  BP: (110-116)/(58-65) 116/65  Body mass index is 28.41 kg/m². Physical Exam:  Physical Exam  Constitutional:       General: She is not in acute distress. Appearance: Normal appearance. She is well-developed and normal weight. She is not ill-appearing, toxic-appearing or diaphoretic. Genitourinary:      Vulva normal.      No vaginal discharge. HENT:      Head: Normocephalic and atraumatic.    Eyes:      General: No scleral icterus. Conjunctiva/sclera: Conjunctivae normal.   Cardiovascular:      Rate and Rhythm: Normal rate. Pulmonary:      Effort: Pulmonary effort is normal.   Abdominal:      Palpations: Abdomen is soft. There is no mass. Tenderness: There is no abdominal tenderness. There is no guarding or rebound. Musculoskeletal:      Right lower leg: No edema. Left lower leg: No edema. Neurological:      General: No focal deficit present. Mental Status: She is alert and oriented to person, place, and time. Skin:     General: Skin is warm and dry. Psychiatric:         Mood and Affect: Mood normal.         Behavior: Behavior normal.   Vitals reviewed. Exam conducted with a chaperone present. SVE: 0.5/20/-3       FHT:  Baseline Rate: 130 bpm  Variability: Moderate 6-25 bpm  Accelerations: 15 x 15 or greater, At variable times  Decelerations: Variable  FHR Category: Category I    TOCO:   Contraction Frequency (minutes): 1  Contraction Duration (seconds): 100  Contraction Quality: Mild     Biophysical Profile  Fetal Breathing: (!) 0  Fetal Movement: 2  Fetal Tone: (!) 0  Fluid Volume: 2  Nonstress Test: 2  Biophysical Profile Score (of 8): 4 /8  Biophysical Profile Score (of 10): 6 /10    Ultrasound Other  Fetal Presentation: Vertex    Interpretation  Nonstress Test Interpretation: Reactive    CASIMIRO  - Q1 2.6  - Q2 6.35  - Q3 1.9  - Q4 4.34    Lab Results   Component Value Date    WBC 6.7 05/18/2023    HGB 11.9 05/18/2023    HCT 35.0 05/18/2023     (L) 05/18/2023     Lab Results   Component Value Date    K 3.6 04/13/2023     04/13/2023    CO2 22 04/13/2023    BUN 9 04/13/2023    CREATININE 0.56 (L) 04/13/2023    AST 16 04/13/2023    ALT 13 04/13/2023     Prenatal Labs: Reviewed      Blood type: A Negative  Antibody: pending  GBS: Pending  HIV: non-reactive  Rubella: Immune  HCV Ab:  Nonreactive  VDRL/RPR: Non reactive  HBsAg: Negative  Chlamydia: Negative  Gonorrhea: Negative  Diabetes 1 hour screen: normal    <2 Midnights  OBSERVATION    Signature/Title: Ham Law MD  Date: 7/11/2023  Time: 3:28 PM

## 2023-07-11 NOTE — PROGRESS NOTES
Repeat Non-Stress Testing:    Patient verbalizes +FM. Pt denies ALL:               Leaking of fluid   Contractions   Vaginal bleeding   Decreased fetal movement    Patient is performing daily kick counts. Patient has no questions or concerns. NST strip reviewed by Dr. Barbie Coleman prior to completion of appointment.

## 2023-07-11 NOTE — PLAN OF CARE
Problem: ANTEPARTUM  Goal: Maintain pregnancy as long as maternal and/or fetal condition is stable  Description: INTERVENTIONS:  - Maternal surveillance  - Fetal surveillance  - Monitor uterine activity  - Medications as ordered  - Bedrest  Outcome: Progressing     Problem: PAIN - ADULT  Goal: Verbalizes/displays adequate comfort level or baseline comfort level  Description: Interventions:  - Encourage patient to monitor pain and request assistance  - Assess pain using appropriate pain scale  - Administer analgesics based on type and severity of pain and evaluate response  - Implement non-pharmacological measures as appropriate and evaluate response  - Consider cultural and social influences on pain and pain management  - Notify physician/advanced practitioner if interventions unsuccessful or patient reports new pain  Outcome: Progressing     Problem: INFECTION - ADULT  Goal: Absence or prevention of progression during hospitalization  Description: INTERVENTIONS:  - Assess and monitor for signs and symptoms of infection  - Monitor lab/diagnostic results  - Monitor all insertion sites, i.e. indwelling lines, tubes, and drains  - Monitor endotracheal if appropriate and nasal secretions for changes in amount and color  - [place] appropriate cooling/warming therapies per order  - Administer medications as ordered  - Instruct and encourage patient and family to use good hand hygiene technique  - Identify and instruct in appropriate isolation precautions for identified infection/condition  Outcome: Progressing  Goal: Absence of fever/infection during neutropenic period  Description: INTERVENTIONS:  - Monitor WBC    Outcome: Progressing     Problem: SAFETY ADULT  Goal: Patient will remain free of falls  Description: INTERVENTIONS:  - Educate patient/family on patient safety including physical limitations  - Instruct patient to call for assistance with activity   - Consult OT/PT to assist with strengthening/mobility   - Keep Call bell within reach  - Keep bed low and locked with side rails adjusted as appropriate  - Keep care items and personal belongings within reach  - Initiate and maintain comfort rounds    Outcome: Progressing  Goal: Maintain or return to baseline ADL function  Description: INTERVENTIONS:  -  Assess patient's ability to carry out ADLs; assess patient's baseline for ADL function and identify physical deficits which impact ability to perform ADLs (bathing, care of mouth/teeth, toileting, grooming, dressing, etc.)  - Assess/evaluate cause of self-care deficits   - Assess range of motion  - Assess patient's mobility; develop plan if impaired  - Assess patient's need for assistive devices and provide as appropriate  - Encourage maximum independence but intervene and supervise when necessary  - Involve family in performance of ADLs  - Assess for home care needs following discharge   - Consider OT consult to assist with ADL evaluation and planning for discharge  - Provide patient education as appropriate  Outcome: Progressing  Goal: Maintains/Returns to pre admission functional level  Description: INTERVENTIONS:  - Perform BMAT or MOVE assessment daily.   - Set and communicate daily mobility goal to care team and patient/family/caregiver. - Collaborate with rehabilitation services on mobility goals if consulted    - Out of bed for toileting  - Record patient progress and toleration of activity level   Outcome: Progressing     Problem: Knowledge Deficit  Goal: Patient/family/caregiver demonstrates understanding of disease process, treatment plan, medications, and discharge instructions  Description: Complete learning assessment and assess knowledge base.   Interventions:  - Provide teaching at level of understanding  - Provide teaching via preferred learning methods  Outcome: Progressing     Problem: DISCHARGE PLANNING  Goal: Discharge to home or other facility with appropriate resources  Description: INTERVENTIONS:  - Identify barriers to discharge w/patient and caregiver  - Arrange for needed discharge resources and transportation as appropriate  - Identify discharge learning needs (meds, wound care, etc.)  - Arrange for interpretive services to assist at discharge as needed  - Refer to Case Management Department for coordinating discharge planning if the patient needs post-hospital services based on physician/advanced practitioner order or complex needs related to functional status, cognitive ability, or social support system  Outcome: Progressing

## 2023-07-11 NOTE — ASSESSMENT & PLAN NOTE
• Routine postpartum care  • Encourage ambulation  • Encourage familial bonding  • Lactation support as needed  • Pain: Motrin/Tylenol around the clock, oxycodone if needed  • Pre-delivery Hgb 12.9 --> Post Delivery Hgb 11.7  • Passed void trial  • DVT Ppx: ambulation, SCDs

## 2023-07-11 NOTE — ANESTHESIA PROCEDURE NOTES
Spinal Block    Patient location during procedure: OB  Start time: 7/11/2023 7:09 PM  Reason for block: primary anesthetic  Staffing  Performed by: Zurdo Valentin CRNA  Authorized by: Kristopher Fraser MD    Preanesthetic Checklist  Completed: patient identified, IV checked, site marked, risks and benefits discussed, surgical consent, monitors and equipment checked, pre-op evaluation and timeout performed  Spinal Block  Patient position: sitting  Prep: ChloraPrep  Patient monitoring: continuous pulse ox, frequent blood pressure checks and heart rate  Approach: midline  Location: L3-4  Injection technique: single-shot  Needle  Needle type: Tuohy   Needle gauge: 25 G  Needle length: 10 cm  Assessment  Sensory level: T4  Injection Assessment:  negative aspiration for heme, no paresthesia on injection and positive aspiration for clear CSF.   Post-procedure:  site cleaned

## 2023-07-12 PROBLEM — O28.8 NST (NON-STRESS TEST) NONREACTIVE: Status: RESOLVED | Noted: 2023-07-11 | Resolved: 2023-07-12

## 2023-07-12 LAB
ABO GROUP BLD: NORMAL
BLD GP AB SCN SERPL QL: POSITIVE
ERYTHROCYTE [DISTWIDTH] IN BLOOD BY AUTOMATED COUNT: 14.6 % (ref 11.6–15.1)
FETAL CELL SCN BLD QL ROSETTE: NEGATIVE
HCT VFR BLD AUTO: 34.6 % (ref 34.8–46.1)
HGB BLD-MCNC: 11.7 G/DL (ref 11.5–15.4)
MCH RBC QN AUTO: 31.3 PG (ref 26.8–34.3)
MCHC RBC AUTO-ENTMCNC: 33.8 G/DL (ref 31.4–37.4)
MCV RBC AUTO: 93 FL (ref 82–98)
PLATELET # BLD AUTO: 129 THOUSANDS/UL (ref 149–390)
PMV BLD AUTO: 11.2 FL (ref 8.9–12.7)
RBC # BLD AUTO: 3.74 MILLION/UL (ref 3.81–5.12)
RH BLD: NEGATIVE
WBC # BLD AUTO: 15.31 THOUSAND/UL (ref 4.31–10.16)

## 2023-07-12 PROCEDURE — 86900 BLOOD TYPING SEROLOGIC ABO: CPT | Performed by: OBSTETRICS & GYNECOLOGY

## 2023-07-12 PROCEDURE — 86901 BLOOD TYPING SEROLOGIC RH(D): CPT | Performed by: OBSTETRICS & GYNECOLOGY

## 2023-07-12 PROCEDURE — 86850 RBC ANTIBODY SCREEN: CPT | Performed by: OBSTETRICS & GYNECOLOGY

## 2023-07-12 PROCEDURE — 85027 COMPLETE CBC AUTOMATED: CPT | Performed by: OBSTETRICS & GYNECOLOGY

## 2023-07-12 PROCEDURE — 99024 POSTOP FOLLOW-UP VISIT: CPT | Performed by: OBSTETRICS & GYNECOLOGY

## 2023-07-12 PROCEDURE — 85461 HEMOGLOBIN FETAL: CPT | Performed by: OBSTETRICS & GYNECOLOGY

## 2023-07-12 RX ORDER — METOCLOPRAMIDE HYDROCHLORIDE 5 MG/ML
10 INJECTION INTRAMUSCULAR; INTRAVENOUS EVERY 6 HOURS PRN
Status: DISCONTINUED | OUTPATIENT
Start: 2023-07-12 | End: 2023-07-15 | Stop reason: HOSPADM

## 2023-07-12 RX ADMIN — SERTRALINE 25 MG: 25 TABLET, FILM COATED ORAL at 08:19

## 2023-07-12 RX ADMIN — SIMETHICONE 80 MG: 80 TABLET, CHEWABLE ORAL at 22:02

## 2023-07-12 RX ADMIN — HUMAN RHO(D) IMMUNE GLOBULIN 300 MCG: 300 INJECTION, SOLUTION INTRAMUSCULAR at 22:04

## 2023-07-12 RX ADMIN — DOCUSATE SODIUM 100 MG: 100 CAPSULE, LIQUID FILLED ORAL at 22:02

## 2023-07-12 RX ADMIN — KETOROLAC TROMETHAMINE 15 MG: 30 INJECTION, SOLUTION INTRAMUSCULAR; INTRAVENOUS at 12:43

## 2023-07-12 RX ADMIN — ENOXAPARIN SODIUM 40 MG: 40 INJECTION SUBCUTANEOUS at 08:19

## 2023-07-12 RX ADMIN — ACETAMINOPHEN 650 MG: 325 TABLET, FILM COATED ORAL at 16:16

## 2023-07-12 RX ADMIN — KETOROLAC TROMETHAMINE 15 MG: 30 INJECTION, SOLUTION INTRAMUSCULAR; INTRAVENOUS at 06:16

## 2023-07-12 RX ADMIN — METOCLOPRAMIDE 10 MG: 5 INJECTION, SOLUTION INTRAMUSCULAR; INTRAVENOUS at 02:02

## 2023-07-12 RX ADMIN — ACETAMINOPHEN 650 MG: 325 TABLET, FILM COATED ORAL at 21:24

## 2023-07-12 RX ADMIN — ACETAMINOPHEN 650 MG: 325 TABLET, FILM COATED ORAL at 03:58

## 2023-07-12 RX ADMIN — ACETAMINOPHEN 650 MG: 325 TABLET, FILM COATED ORAL at 09:54

## 2023-07-12 RX ADMIN — DOCUSATE SODIUM 100 MG: 100 CAPSULE, LIQUID FILLED ORAL at 08:19

## 2023-07-12 NOTE — CONSULTS
NICU Prenatal Consult   Robbie Mcardle 34 y.o. female MRN: [id number]  Unit/Bed#: LD PACU-02 Encounter: [id number]    Consulting Physician: Efrain Freeman DO      A NICU Prenatal Consult has been requested by OB due to decelerations, suspected trisomy 24. Robbie Mcardle is a 34 y.o. [id number], with an LEVAR of 8/17/2023 at [address] [place] [address] [place] is expecting a son, to be named Sulaiman. Estimated fetal weight is 2152g (as of 7/6). She intends to breastfeed and consents to use of donor milk as bridge. Parents also consented to vitamin K and erythromycin, will do Hepatitis B at pediatrician. Along with Tyson Schwab, her  was present for the consultation.      Prenatal Care:Yes, see Presenting Problem above    Prenatal Labs:  Lab Results   Component Value Date/Time    ABO Grouping A 07/11/2023 03:25 PM    ABO Grouping A 09/12/2014 07:45 AM    Rh Factor Negative 07/11/2023 03:25 PM    Rh Factor Negative 09/12/2014 07:45 AM    Rh Type RH(D) NEGATIVE 05/18/2023 09:45 AM    Antibody Screen Negative 09/12/2014 07:45 AM    Hepatitis B Surface Ag neg 04/13/2020 12:00 AM    HEP C AB NON-REACTIVE 01/24/2023 09:35 AM    HEP C AB NON REACTIVE 10/17/2019 12:00 AM    RPR NON-REACTIVE 05/18/2023 09:45 AM    RPR Non-Reactive 10/22/2020 05:49 PM    RPR Non-Reactive (q 09/12/2014 07:45 AM    HIV-1/HIV-2 AB Non-Reactive 04/13/2020 12:00 AM    HIV AG/AB, 4th Gen NON-REACTIVE 01/24/2023 09:35 AM       Unknown labs at this time: GBS    Pregnancy complications:   Patient Active Problem List   Diagnosis   • Chronic tension headaches   • Recurrent pregnancy loss   • S/P myomectomy   • Gestational thrombocytopenia (HCC)   • Anxiety   • PONV (postoperative nausea and vomiting)   • Migraine   • Status post hysteroscopy   • Multigravida of advanced maternal age in third trimester   • Grand multiparity   • Iron deficiency anemia during pregnancy   • Trisomy 21, fetal, affecting care of mother, antepartum   • NST (non-stress test) nonreactive   • 34 weeks gestation of pregnancy     Maternal medical history:   Past Medical History:   Diagnosis Date   • Allergy     seasonal allergy   • Anxiety    • Chronic endometritis    • Cold intolerance     Resolved 12/15/2015    • Migraine    • Miscarriage     x 5   • Pelvic pressure in female     Resolved 12/15/2015    • PONV (postoperative nausea and vomiting)     Patient requests to be pre-medicated for N/V prior to all surgeries. Also notes some light-headedness and slow to awake s/p anesthesia   • Recurrent pregnancy loss    • Varicella 1989    as a child   • Vomiting during pregnancy     Resolved 12/15/2015    • Wears glasses      Medications at home:   Medications Prior to Admission   Medication   • cetirizine (ZyrTEC) 10 mg tablet   • Prenatal Vit-Fe Fumarate-FA (PRENATAL 1 PLUS 1 PO)   • sertraline (ZOLOFT) 25 mg tablet   • fluocinolone acetonide (DERMOTIC) 0.01 % otic oil   • prochlorperazine (COMPAZINE) 10 mg tablet     Maternal social history:  Social History     Tobacco Use   • Smoking status: Never   • Smokeless tobacco: Never   Substance Use Topics   • Alcohol use: Not Currently     Maternal  medications:  steroids: betamethasone       I spoke with Yuliana Ernst today regarding the upcoming birth of her son Sulaiman.  We discussed the baby's possible medical problems and the specialized care needed to address these problems. This discussion included, but was not limited to:      Attendance of physician/ABHINAV, nursing, and/or respiratory therapist in the delivery and delivery room management , Risk of feedings problems and potential need for IV fluids or gavage tube feeds, Risk of hypoglycemia requiring formula feeding if breastmilk is unavailable or IV dextrose infusion, Risk of hypothermia and need for radiant warmer and/or isolette, Risk of immature cardiorespiratory control and need for monitoring and/or caffeine , Risk of jaundice requiring phototherapy, Risk of NEC and advantages of breast milk  and Risk of respiratory distress and possible need for support (oxygen, CPAP, intubation, and/or surfactant)      All of Silvina's questions were answered to the best of my ability, and she was encouraged to contact us with any further questions. Thank you for including us in the care of Gloriaboom Núñezflorinda. Please reach out to the on-call Neonatologist via [employer] for any further questions that may arise. I spent 60 minutes in consultation with Avelino Menjivar, of which 90% was in direct communication.     Felicity Fagan, [address] [place] Medicine

## 2023-07-12 NOTE — PROGRESS NOTES
Obstetrics Progress Note  Jearlean Hatchet 34 y.o. female MRN: [id number]  Unit/Bed#: -01 Encounter: [id number]    Assessment/Plan:  Postoperative day #1 s/p primary low transverse  [facility]. By issue:  * Status post primary low transverse  section at 34 weeks   Assessment & Plan  • Routine postpartum care  • Encourage ambulation  • Encourage familial bonding  • Lactation support as needed  • Pain: Motrin/Tylenol around the clock, oxycodone if needed  • Pre-delivery Hgb 12.9 --> Post Delivery Hgb 11.7  • Kang catheter: UOP 1.88mL/kg/hr, pull kang and assess void trial  • DVT Ppx: ambulation, SCDs    Trisomy 21, fetal, affecting care of mother, antepartum  Assessment & Plan  NIPT Positive, confirmed via amniocentesis  Fetal echo 23 with possible AV canal defect    Iron deficiency anemia during pregnancy  Assessment & Plan  Lab Results   Component Value Date    HGB 11.7 2023       Gestational thrombocytopenia [facility])  Assessment & Plan  Lab Results   Component Value Date     (L) 2023     Continue to trend      NST (non-stress test) nonreactive-resolved as of 2023  Assessment & Plan  BPP 6/10 - +2 NST, CASIMIRO, fetal movement  Overnight observation for continuous monitoring  Plan to repeat BPP in AM - discussed indications for delivery if equivocal or worsening fetal status   Betamethasone -    Anticipate discharge -. Subjective/Objective   Chief Complaint:   Post delivery. Subjective:   Pain: Controlled. Tolerating PO: yes. Voiding: Kang in place. Flatus: Yes. Bowel Movement: No. Ambulating: Not yet due to nausea, she'd like to get out of bed today and go to NICU. Chest pain: no. Shortness of breath: no. Leg pain: no. Lochia: within normal limits. Infant feeding: breast, currently receiving donor milk in NICU.     Objective:   Vitals:   Temp:  [97.7 °F (36.5 °C)-98.5 °F (36.9 °C)] 97.9 °F (36.6 °C)  HR:  [61-87] 61  Resp:  [16-20] 18  BP: (102-125)/(53-72) 102/53     Intake/Output Summary (Last 24 hours) at 7/12/2023 0547  Last data filed at 7/12/2023 0401  Gross per 24 hour   Intake 2523.12 ml   Output 2400 ml   Net 123.12 ml       Physical Exam:   -General: alert and oriented x 3, in no apparent distress  -Cardiovascular: regular rate and rhythm  -Pulmonary: normal effort, clear to auscultation  bilaterally  -Abdomen/Pelvis: soft, non-tender, non-distended, no rebound or guarding. Uterine fundus firm and non-tender, at the umbilicus.  Incision: CDI  -Extremities: Nontender    Lab Results   Component Value Date    WBC 15.31 (H) 07/12/2023    HGB 11.7 07/12/2023    HCT 34.6 (L) 07/12/2023    MCV 93 07/12/2023     (L) 07/12/2023         Mauro Mora MD  PGY-I, OBGYN  7/12/2023, 5:47 AM

## 2023-07-12 NOTE — OP NOTE
OPERATIVE REPORT  PATIENT NAME: Kari Bender    :  1985  MRN: [id number]  Pt Location: [facility] L&D OR ROOM 02    SURGERY DATE: 2023     Section Procedure Note    Indications:   Persistent category II fetal heart tracing     Pre-operative Diagnosis:   34w5d pregnancy  AMA  [place]   History of  x6   Fetus with NIPT positive for T21, amnioconfirmed   MDD   Gestational thrombocytopenia     Post-operative Diagnosis:   1LTCS     Attending: Molly Cogan, DO  Resident: Luis Fernando Cabrera MD    Maternal Findings:  Normal uterus  Nuknit placed on the hysterotomy for additional hemostasis  Normal tubes and ovaries bilaterally  No fascial defects noted prior to closure   Rectus muscles intact and hemostatic prior to closure   No adhesions  No difficulty noted from skin to delivery     Findings:  Delivery of viable male on  at 1924 weighing 4lbs 7oz  Tight double nuchal cord and body cord, reduced with delivery  Apgar scores of 8 at one minute and 9 at five minutes  clear amniotic fluid  Normal placenta with 3-vessel cord    Arterial and Venous Gases:  Umbilical Cord Venous Blood Gas:  Results from last 7 days   Lab Units 23   PH COV  7.392   PCO2 COV mm HG 41.2   HCO3 COV mmol/L 24.5   BASE EXC COV mmol/L -0.4*   O2 CT CD VB mL/dL 17.1   O2 HGB, VENOUS CORD % 17.5     Umbilical Cord Arterial Blood Gas:  Results from last 7 days   Lab Units 23   PH COA  7.255   PCO2 COA  66.6*   PO2 COA mm HG 10.0   HCO3 COA mmol/L 28.9*   BASE EXC COA mmol/L -0.8*   O2 CONTENT CORD ART ml/dl 3.9   O2 HGB, ARTERIAL CORD % 14.4       Specimens: Arterial and venous cord gases, cord blood, segment of umbilical cord, placenta to pathology     Shayy Criteria Decision Aid:   Multiple pregnancy - NO - Transverse or oblique lie - No - Breech lie - NO - Gestational age <37 weeks - YES -  is SHAYY GROUP 10    Quantitative Blood Loss: 350 mL    Drains: Cortes catheter Complications:  None; patient tolerated the procedure well. Disposition: PACU            Condition: stable    Brief History   Gasper Shay is a 33 y/o [id number] at 34w5d  who presented to labor and delivery after being sent from the  center for monitoring after having deceleration on NST. Patient had a BPP performed here 6/10 and was placed on continuous monitoring. Her cervical exam was cl/thick. During her monitoring period, patient was noted to have prolonged decelerations. Decision was made to proceed with delivery via . Starting Hgb was 12.9 , platelet count 273. Risks, benefits, possible complications, alternate treatment options and expected outcomes were discussed with the patient. The patient agreed upon the proposed plan and gave informed consent for  delivery for the indication of persistent category II fetal heart tracing. Procedure Details   The patient was taken to the [facility] Operating Room where she received spinal anesthesia. For infection prophylaxis, she received clindamycin and gentamicin preoperatively. Fetal heart tones in the OR were assessed and noted to be within normal limits and a Cortes catheter and SCDs were placed. The abdomen was prepped with Chloraprep, the vagina was prepped with Chlorhexidine, and following appropriate drying time, the patient was draped in the usual sterile manner. A Time Out was held and the above information confirmed. The patient was identified as Daksha Helludivina and the procedure verified as a  Delivery for persistent category II tracing. A Pfannenstiel incision was made and carried down through the underlying subcutaneous tissue to the fascia using a scalpel. The rectus fascia was then nicked in the midline and then proceeded in Ford-Chen fashion. The rectus muscles were  and the peritoneum was identified, entered, and extended longitudinally with blunt dissection. The bladder blade was inserted.  A low transverse uterine incision was made with the scalpel and extended cephalocaudad with blunt dissection. The amnion was entered bluntly. The fetal head was palpated, elevated, and delivered through the uterine incision followed by the body without difficulty. There was a tight nuchal cord wrapped around the neck twice and around the trunk once. Time of birth was noted at 65. There was noted to be spontaneous cry and good tone. There was no apparent injury to the . The umbilical cord was doubly clamped and cut after 30 seconds to allow for delayed cord clamping. The infant was handed off to the  providers. Arterial and venous cord gases, cord blood, and a segment of umbilical cord were obtained for evaluation. The placenta delivered spontaneously at 1927 with uterine fundal massage and appeared normal. The uterus was exteriorized and cleaned out with a moist lap sponge. The uterine incision was closed with a running locked suture of 0 Monocryl. A second layer of the same suture was used to imbricate the first. There as oozing noted along the hysterotomy and figure of eight stitches were placed for hemostasis. The posterior cul de sac was cleared for clots. The uterus was returned to the abdomen. The paracolic gutters were inspected and cleared of all clots. The hysterotomy was re-inspected and there was still persistent oozing. Pressure was held and the oozing improved. Nuknit was placed for additional hemostasis. The rectus muscles were inspected and hemostatic. The pertioneum was closed with 2-0 plain. The fascia was closed with a running suture of 0 Vicryl. The skin was closed with a subcuticular running suture of 4-0 Stratafix. Exofin dressings was applied. The patient appeared to tolerate the procedure very well. Lap sponge, needle, and instrument counts were correct x2. The patient's fundus was palpated and the uterus was expressed.  She was then cleaned and transferred to her postpartum recovery room in stable condition and her infant went to the NICU. Attending Attestation: Dr. Ancelmo Foy DO was present for the entire procedure.     Gary Stewart MD   OB/GYN, PGY-4  7/11/2023 9:42 PM

## 2023-07-12 NOTE — PLAN OF CARE
Problem: PAIN - ADULT  Goal: Verbalizes/displays adequate comfort level or baseline comfort level  Description: Interventions:  - Encourage patient to monitor pain and request assistance  - Assess pain using appropriate pain scale  - Administer analgesics based on type and severity of pain and evaluate response  - Implement non-pharmacological measures as appropriate and evaluate response  - Consider cultural and social influences on pain and pain management  - Notify physician/advanced practitioner if interventions unsuccessful or patient reports new pain  Outcome: Progressing     Problem: INFECTION - ADULT  Goal: Absence or prevention of progression during hospitalization  Description: INTERVENTIONS:  - Assess and monitor for signs and symptoms of infection  - Monitor lab/diagnostic results  - Monitor all insertion sites, i.e. indwelling lines, tubes, and drains  - Monitor endotracheal if appropriate and nasal secretions for changes in amount and color  - [place] appropriate cooling/warming therapies per order  - Administer medications as ordered  - Instruct and encourage patient and family to use good hand hygiene technique  - Identify and instruct in appropriate isolation precautions for identified infection/condition  Outcome: Progressing  Goal: Absence of fever/infection during neutropenic period  Description: INTERVENTIONS:  - Monitor WBC    Outcome: Progressing     Problem: SAFETY ADULT  Goal: Patient will remain free of falls  Description: INTERVENTIONS:  - Educate patient/family on patient safety including physical limitations  - Instruct patient to call for assistance with activity   - Consult OT/PT to assist with strengthening/mobility   - Keep Call bell within reach  - Keep bed low and locked with side rails adjusted as appropriate  - Keep care items and personal belongings within reach  - Apply yellow socks and bracelet for high fall risk patients  - Consider moving patient to room near nurses station  Outcome: Progressing  Goal: Maintain or return to baseline ADL function  Description: INTERVENTIONS:  -  Assess patient's ability to carry out ADLs; assess patient's baseline for ADL function and identify physical deficits which impact ability to perform ADLs (bathing, care of mouth/teeth, toileting, grooming, dressing, etc.)  - Assess/evaluate cause of self-care deficits   - Assess range of motion  - Assess patient's mobility; develop plan if impaired  - Assess patient's need for assistive devices and provide as appropriate  - Encourage maximum independence but intervene and supervise when necessary  - Involve family in performance of ADLs  - Assess for home care needs following discharge   - Consider OT consult to assist with ADL evaluation and planning for discharge  - Provide patient education as appropriate  Outcome: Progressing  Goal: Maintains/Returns to pre admission functional level  Description: INTERVENTIONS:  - Perform BMAT or MOVE assessment daily.   - Set and communicate daily mobility goal to care team and patient/family/caregiver. - Collaborate with rehabilitation services on mobility goals if consulted  - Record patient progress and toleration of activity level   Outcome: Progressing     Problem: Knowledge Deficit  Goal: Patient/family/caregiver demonstrates understanding of disease process, treatment plan, medications, and discharge instructions  Description: Complete learning assessment and assess knowledge base.   Interventions:  - Provide teaching at level of understanding  - Provide teaching via preferred learning methods  Outcome: Progressing     Problem: DISCHARGE PLANNING  Goal: Discharge to home or other facility with appropriate resources  Description: INTERVENTIONS:  - Identify barriers to discharge w/patient and caregiver  - Arrange for needed discharge resources and transportation as appropriate  - Identify discharge learning needs (meds, wound care, etc.)  - Arrange for interpretive services to assist at discharge as needed  - Refer to Case Management Department for coordinating discharge planning if the patient needs post-hospital services based on physician/advanced practitioner order or complex needs related to functional status, cognitive ability, or social support system  Outcome: Progressing     Problem: POSTPARTUM  Goal: Experiences normal postpartum course  Description: INTERVENTIONS:  - Monitor maternal vital signs  - Assess uterine involution and lochia  Outcome: Progressing  Goal: Appropriate maternal -  bonding  Description: INTERVENTIONS:  - Identify family support  - Assess for appropriate maternal/infant bonding   -Encourage maternal/infant bonding opportunities  - Referral to  or  as needed  Outcome: Progressing  Goal: Establishment of infant feeding pattern  Description: INTERVENTIONS:  - Assess breast/bottle feeding  - Refer to lactation as needed  Outcome: Progressing  Goal: Incision(s), wounds(s) or drain site(s) healing without S/S of infection  Description: INTERVENTIONS  - Assess and document dressing, incision, wound bed, drain sites and surrounding tissue  - Provide patient and family education  Outcome: Progressing

## 2023-07-12 NOTE — DISCHARGE SUMMARY
Discharge Summary - OB/GYN   David Eis 34 y.o. female MRN: [id number]  Unit/Bed#: -01 Encounter: [id number]      Admission Date: 2023     Discharge Date: 7/15/2023    Admitting Diagnosis:   Pregnancy at 34w5d  AMA  Non-reactive NST   [facility]   History of  x6   Fetus with NIPT positive for T21, amnioconfirmed   MDD   Gestational thrombocytopenia     Discharge Diagnosis:   Same, delivered  Persistent category II fetal heart tracing     Procedures: primary  section, low transverse incision    Delivery Attending: Selene Drake DO  Discharge Attending: Dr. Agnes Haro Course:   Shadia Brooke is a 35 y/o [id number] at 34w5d  who presented to labor and delivery after being sent from the  center for monitoring after having deceleration on NST. Patient had a BPP performed here 6/10 and was placed on continuous monitoring. Her cervical exam was cl/thick. During her monitoring period, patient was noted to have prolonged decelerations. Decision was made to proceed with delivery via . Starting Hgb was 12.9 , platelet count 805. Risks, benefits, possible complications, alternate treatment options and expected outcomes were discussed with the patient. The patient agreed upon the proposed plan and gave informed consent for  delivery for the indication of persistent category II fetal heart tracing. She delivered a viable male  on  at 65. Weight 4lbs 7.3oz via primary  section, low transverse incision. Apgars were 8 (1 min) and 9 (5 min).  was transferred to NICU. Patient tolerated the procedure well and was transferred to recovery in stable condition. Her post-operative course was uncomplicated. Preoperative hemoglobin was 12.9, postoperative was 11.7. Her postoperative pain was well controlled with oral analgesics. On day of discharge, she was ambulating and able to reasonably perform all ADLs.  She was voiding and had appropriate bowel function. Pain was well controlled. She was discharged home on post-operative day #4 without complications. Patient was instructed to follow up with her OB as an outpatient and was given appropriate warnings to call provider if she develops signs of infection or uncontrolled pain. Complications: none apparent    Condition at discharge: good     Discharge instructions/Information to patient and family:   See after visit summary for information provided to patient and family. Provisions for Follow-Up Care:  See after visit summary for information related to follow-up care and any pertinent home health orders. Disposition: Home    Planned Readmission: No    Discharge Medications: For a complete list of the patient's medications, please refer to her med rec.       Jhonathan Vinson MD  PGY 1, Obstetrics and Gynecology  7/15/2023  2:39 PM

## 2023-07-12 NOTE — ANESTHESIA POSTPROCEDURE EVALUATION
Post-Op Assessment Note    CV Status:  Stable  Pain Score: 0    Pain management: adequate     Mental Status:  Alert and awake   Hydration Status:  Euvolemic   PONV Controlled:  Controlled   Airway Patency:  Patent      Post Op Vitals Reviewed: Yes      Staff: CRNA         There were no known notable events for this encounter.     /63 (07/11/23 2003)    Temp 97.9 °F (36.6 °C) (07/11/23 2003)    Pulse 73 (07/11/23 2003)   Resp 20 (07/11/23 2003)    SpO2 97 % (07/11/23 2003)

## 2023-07-12 NOTE — PLAN OF CARE
Problem: PAIN - ADULT  Goal: Verbalizes/displays adequate comfort level or baseline comfort level  Description: Interventions:  - Encourage patient to monitor pain and request assistance  - Assess pain using appropriate pain scale  - Administer analgesics based on type and severity of pain and evaluate response  - Implement non-pharmacological measures as appropriate and evaluate response  - Consider cultural and social influences on pain and pain management  - Notify physician/advanced practitioner if interventions unsuccessful or patient reports new pain  7/12/2023 0249 by Jerzy Renae RN  Outcome: Progressing  7/12/2023 0248 by Jerzy Renae RN  Outcome: Progressing     Problem: INFECTION - ADULT  Goal: Absence or prevention of progression during hospitalization  Description: INTERVENTIONS:  - Assess and monitor for signs and symptoms of infection  - Monitor lab/diagnostic results  - Monitor all insertion sites, i.e. indwelling lines, tubes, and drains  - Monitor endotracheal if appropriate and nasal secretions for changes in amount and color  - [place] appropriate cooling/warming therapies per order  - Administer medications as ordered  - Instruct and encourage patient and family to use good hand hygiene technique  - Identify and instruct in appropriate isolation precautions for identified infection/condition  7/12/2023 0249 by Jerzy Renae RN  Outcome: Progressing  7/12/2023 0248 by Jerzy Renae RN  Outcome: Progressing  Goal: Absence of fever/infection during neutropenic period  Description: INTERVENTIONS:  - Monitor WBC    7/12/2023 0249 by Jerzy Renae RN  Outcome: Progressing  7/12/2023 0248 by Jerzy Renae RN  Outcome: Progressing     Problem: SAFETY ADULT  Goal: Patient will remain free of falls  Description: INTERVENTIONS:  - Educate patient/family on patient safety including physical limitations  - Instruct patient to call for assistance with activity   - Consult OT/PT to assist with strengthening/mobility   - Keep Call bell within reach  - Keep bed low and locked with side rails adjusted as appropriate  - Keep care items and personal belongings within reach  - Initiate and maintain comfort rounds  - Make Fall Risk Sign visible to staff  - Offer Toileting every  Hours, in advance of need  - Initiate/Maintain alarm  - Obtain necessary fall risk management equipment:   - Apply yellow socks and bracelet for high fall risk patients  - Consider moving patient to room near nurses station  7/12/2023 0249 by Candelaria Ac RN  Outcome: Progressing  7/12/2023 0248 by Candelaria Ac RN  Outcome: Progressing  Goal: Maintain or return to baseline ADL function  Description: INTERVENTIONS:  -  Assess patient's ability to carry out ADLs; assess patient's baseline for ADL function and identify physical deficits which impact ability to perform ADLs (bathing, care of mouth/teeth, toileting, grooming, dressing, etc.)  - Assess/evaluate cause of self-care deficits   - Assess range of motion  - Assess patient's mobility; develop plan if impaired  - Assess patient's need for assistive devices and provide as appropriate  - Encourage maximum independence but intervene and supervise when necessary  - Involve family in performance of ADLs  - Assess for home care needs following discharge   - Consider OT consult to assist with ADL evaluation and planning for discharge  - Provide patient education as appropriate  7/12/2023 0249 by Candelaria Ac RN  Outcome: Progressing  7/12/2023 0248 by Candelaria Ac RN  Outcome: Progressing  Goal: Maintains/Returns to pre admission functional level  Description: INTERVENTIONS:  - Perform BMAT or MOVE assessment daily.   - Set and communicate daily mobility goal to care team and patient/family/caregiver. - Collaborate with rehabilitation services on mobility goals if consulted  - Perform Range of Motion  times a day. - Reposition patient every  hours.   - Dangle patient  times a day  - Stand patient  times a day  - Ambulate patient  times a day  - Out of bed to chair  times a day   - Out of bed for meals  times a day  - Out of bed for toileting  - Record patient progress and toleration of activity level   7/12/2023 0249 by Silver Verduzco RN  Outcome: Progressing  7/12/2023 0248 by Silver Verduzco RN  Outcome: Progressing     Problem: Knowledge Deficit  Goal: Patient/family/caregiver demonstrates understanding of disease process, treatment plan, medications, and discharge instructions  Description: Complete learning assessment and assess knowledge base.   Interventions:  - Provide teaching at level of understanding  - Provide teaching via preferred learning methods  7/12/2023 0249 by Silver Verduzco RN  Outcome: Progressing  7/12/2023 0248 by Silver Verduzco RN  Outcome: Progressing     Problem: DISCHARGE PLANNING  Goal: Discharge to home or other facility with appropriate resources  Description: INTERVENTIONS:  - Identify barriers to discharge w/patient and caregiver  - Arrange for needed discharge resources and transportation as appropriate  - Identify discharge learning needs (meds, wound care, etc.)  - Arrange for interpretive services to assist at discharge as needed  - Refer to Case Management Department for coordinating discharge planning if the patient needs post-hospital services based on physician/advanced practitioner order or complex needs related to functional status, cognitive ability, or social support system  7/12/2023 0249 by Silver Verduzco RN  Outcome: Progressing  7/12/2023 0248 by Silver Verduzco RN  Outcome: Progressing     Problem: POSTPARTUM  Goal: Experiences normal postpartum course  Description: INTERVENTIONS:  - Monitor maternal vital signs  - Assess uterine involution and lochia  7/12/2023 0249 by Silver Verduzco RN  Outcome: Progressing  7/12/2023 0248 by Silver Verduzco RN  Outcome: Progressing  Goal: Appropriate maternal -  bonding  Description: INTERVENTIONS:  - Identify family support  - Assess for appropriate maternal/infant bonding   -Encourage maternal/infant bonding opportunities  - Referral to  or  as needed  2023 by Rick Jones RN  Outcome: Progressing  2023 by Rick Jones RN  Outcome: Progressing  Goal: Establishment of infant feeding pattern  Description: INTERVENTIONS:  - Assess breast/bottle feeding  - Refer to lactation as needed  2023 by Rick Jones RN  Outcome: Progressing  2023 by Rick Jones RN  Outcome: Progressing  Goal: Incision(s), wounds(s) or drain site(s) healing without S/S of infection  Description: INTERVENTIONS  - Assess and document dressing, incision, wound bed, drain sites and surrounding tissue  - Provide patient and family education  - Perform skin care/dressing changes every   2023 by Rick Jones RN  Outcome: Progressing  2023 by Rick Jones RN  Outcome: Progressing     Problem: ANTEPARTUM  Goal: Maintain pregnancy as long as maternal and/or fetal condition is stable  Description: INTERVENTIONS:  - Maternal surveillance  - Fetal surveillance  - Monitor uterine activity  - Medications as ordered  - Bedrest  Outcome: Completed     Problem: BIRTH - VAGINAL/ SECTION  Goal: Fetal and maternal status remain reassuring during the birth process  Description: INTERVENTIONS:  - Monitor vital signs  - Monitor fetal heart rate  - Monitor uterine activity  - Monitor labor progression (vaginal delivery)  - DVT prophylaxis  - Antibiotic prophylaxis  Outcome: Completed  Goal: Emotionally satisfying birthing experience for mother/fetus  Description: Interventions:  - Assess, plan, implement and evaluate the nursing care given to the patient in labor  - Advocate the philosophy that each childbirth experience is a unique experience and support the family's chosen level of involvement and control during the labor process   - Actively participate in both the patient's and family's teaching of the birth process  - Consider cultural, [employer] and age-specific factors and plan care for the patient in labor  Outcome: Completed

## 2023-07-12 NOTE — LACTATION NOTE
This note was copied from a baby's chart.   CONSULT - LACTATION  Baby Boy Sho Fraser Momo 1 days male MRN: [id number]    [address] [facility] NICU Room / Bed: NICU 16/NICU 16- Encounter: [id number]    Maternal Information     MOTHER:  Byron Khan  Maternal Age: 34 y.o.   OB History: # 1 - Date: 07, Sex: Male, Weight: 3260 g (7 lb 3 oz), GA: 39w2d, Delivery: Vaginal, Spontaneous, Apgar1: None, Apgar5: None, Living: Living, Birth Comments: Augmented with pitocin    # 2 - Date: 11/10/08, Sex: Male, Weight: 3147 g (6 lb 15 oz), GA: 39w6d, Delivery: Vaginal, Spontaneous, Apgar1: None, Apgar5: None, Living: Living, Birth Comments: None    # 3 - Date: 07/01/10, Sex: Female, Weight: 3572 g (7 lb 14 oz), GA: 40w1d, Delivery: Vaginal, Spontaneous, Apgar1: None, Apgar5: None, Living: Living, Birth Comments: None    # 4 - Date: 11, Sex: None, Weight: None, GA: 7w0d, Delivery: None, Apgar1: None, Apgar5: None, Living: None, Birth Comments: None    # 5 - Date: 12, Sex: Female, Weight: 3884 g (8 lb 9 oz), GA: 40w4d, Delivery: Vaginal, Spontaneous, Apgar1: None, Apgar5: None, Living: Living, Birth Comments: None    # 6 - Date: 14, Sex: Female, Weight: 4082 g (9 lb), GA: 41w2d, Delivery: Vaginal, Spontaneous, Apgar1: None, Apgar5: None, Living: Living, Birth Comments: None    # 7 - Date: 18, Sex: None, Weight: None, GA: 4w0d, Delivery: Spontaneous , Apgar1: None, Apgar5: None, Living: None, Birth Comments: None    # 8 - Date: 18, Sex: None, Weight: None, GA: 4w0d, Delivery: Spontaneous , Apgar1: None, Apgar5: None, Living: None, Birth Comments: None    # 9 - Date: 10/16/18, Sex: None, Weight: None, GA: 4w0d, Delivery: Spontaneous , Apgar1: None, Apgar5: None, Living: None, Birth Comments: None    # 10 - Date: 2019, Sex: None, Weight: None, GA: 10w0d, Delivery: Spontaneous , Apgar1: None, Apgar5: None, Living: None, Birth Comments: None    # 11 - Date: 10/23/20, Sex: Male, Weight: 3750 g (8 lb 4.3 oz), GA: 40w3d, Delivery: Vaginal, Spontaneous, Apgar1: 9, Apgar5: 9, Living: Living, Birth Comments: None    # 12 - Date: 2021, Sex: None, Weight: None, GA: 8w0d, Delivery: None, Apgar1: None, Apgar5: None, Living: None, Birth Comments: None    # 13 - Date: 2022, Sex: None, Weight: None, GA: 4w0d, Delivery: None, Apgar1: None, Apgar5: None, Living: None, Birth Comments: None    # 14 - Date: 23, Sex: Male, Weight: None, GA: 34w5d, Delivery: , Low Transverse, Apgar1: 8, Apgar5: 9, Living: Living, Birth Comments: None   Previouse breast reduction surgery? No    Lactation history:   Has patient previously breast fed: Yes   How long had patient previously breast fed: 18 months was the longest. 12 months was the shortest.   Previous breast feeding complications:       Past Surgical History:   Procedure Laterality Date   • CONDYLOMA EXCISION/FULGURATION  May 2007    during her first pregnancy; 2017   • CYST REMOVAL     • HYSTEROSCOPY N/A 2022    Procedure: HYSTEROSCOPY W/ ENDOMETRIAL BIOPSY;  Surgeon: Ivis Sorto DO;  Location: [facility];  Service: Gynecology   • POLYPECTOMY N/A 2022    Procedure: POLYPECTOMY;  Surgeon: Ivis Sorto DO;  Location: [facility];  Service: Gynecology   • POPLITEAL SYNOVIAL CYST EXCISION      Resolved 2007    • AR HYSTEROSCOPY BX ENDOMETRIUM&/POLYPC W/WO D&C N/A 10/21/2019    Procedure: HYSTEROSCOPY; BIOPSY (Megan Lucio); polypectomy;  Surgeon: Ivis Sorto DO;  Location: [facility];  Service: Gynecology   • WISDOM TOOTH EXTRACTION          Birth information:  YOB: 2025   Time of birth: 7:24 PM   Sex: male   Delivery type: , Low Transverse   Birth Weight: No birth weight on file.    Percent of Weight Change: Birth weight not on file     Gestational Age: 30w7d   [unfilled]    Assessment     Breast and nipple assessment: normal assessment     Assessment: sleepy    Feeding assessment: no latch  LATCH:  Latch: Too sleepy or reluctant, no latch achieved   Audible Swallowing: None   Type of Nipple: Everted (After stimulation)   Comfort (Breast/Nipple): Soft/non-tender   Hold (Positioning): Partial assist, teach one side, mother does other, staff holds   [facility] Score: 5        Breasts/Nipples   Date Pumping Initiated 23   Left Breast Soft   Right Breast Soft   Left Nipple Everted   Right Nipple Everted   Intervention Hand expression  (Effective for small drops)   Breastfeeding Progress Not yet established;Latch issues; Other issues (comment)   Reasons for not Breastfeeding Infant medical condition   Other OB Lactation Tools   Feeding Devices Pump   Patient Follow-Up   Lactation Consult Status 2   Follow-Up Type Inpatient;Call as needed   Other OB Lactation Documentation    Additional Problem Noted Sulaiman was sleepy and would not wake for latch at this time. HE effective. Encouraged pumping every 2-3 hours to establish and protect supply. Sulaiman is getting DBM in NICU     Feeding recommendations:  breast feed on demand     NICU packet reviewed with family. RSB and D/C booklets declined. Cassandra has intricate knowledge of infant care as she is a . Reviewed WNL symptoms surrounding  infant vs downs syndrome symptoms. Mom provided with and discussed RBS, Hand expression/2nd night handout and increase supply for NICU baby. Reviewed pumping log and expectations for pumping output in the first week. Reviewed cycle pumping and appropriate pump settings, as well as pumping for 10-15 min 8-12 times per day. Enc Mom to discussed putting baby to the breast with the NICU team when baby is medically stable to do so. Enc her to call for lactation support as needed throughout her stay. Pumping:   - When pumping, begin in stimulation mode (high cycle, low vacuum) until milk begins to express. Change pump to expression mode (low cycle, high vacuum).  Use hands on pumping techniques to assist with milk transfer. When milk stops expressing, change back to stimulation mode. When milk begins to flow, change to expression mode. You may cycle pump up to three times in a pumping session. Instructions given on pumping. Discussed when to start, frequency, different pumps available versus manual expression. Instructions given on pumping. Discussed when to start, frequency, different pumps available versus manual expression. Discussed hygiene of hands and supplies as well as assembly, placement of flanges, size of flanged, preparing the breast and cycles and suction settings on pump. Demonstrated use of hand pump. Discussed labeling of milk, storage, and preparation of stored milk. Encouraged hands on pumping regularly followed by hand expression. Encouraged parents to call for assistance, questions, and concerns about breastfeeding. Extension provided.       Brady Tang RN 7/12/2023 12:04 PM

## 2023-07-13 PROBLEM — O35.13X0 TRISOMY 21, FETAL, AFFECTING CARE OF MOTHER, ANTEPARTUM: Status: RESOLVED | Noted: 2023-04-02 | Resolved: 2023-07-13

## 2023-07-13 LAB — GP B STREP DNA SPEC QL NAA+PROBE: NEGATIVE

## 2023-07-13 PROCEDURE — 99024 POSTOP FOLLOW-UP VISIT: CPT | Performed by: OBSTETRICS & GYNECOLOGY

## 2023-07-13 RX ADMIN — ACETAMINOPHEN 650 MG: 325 TABLET, FILM COATED ORAL at 04:31

## 2023-07-13 RX ADMIN — IBUPROFEN 600 MG: 600 TABLET, FILM COATED ORAL at 18:26

## 2023-07-13 RX ADMIN — DOCUSATE SODIUM 100 MG: 100 CAPSULE, LIQUID FILLED ORAL at 09:37

## 2023-07-13 RX ADMIN — SIMETHICONE 80 MG: 80 TABLET, CHEWABLE ORAL at 12:25

## 2023-07-13 RX ADMIN — ACETAMINOPHEN 650 MG: 325 TABLET, FILM COATED ORAL at 09:37

## 2023-07-13 RX ADMIN — ACETAMINOPHEN 650 MG: 325 TABLET, FILM COATED ORAL at 16:51

## 2023-07-13 RX ADMIN — IBUPROFEN 600 MG: 600 TABLET, FILM COATED ORAL at 12:25

## 2023-07-13 RX ADMIN — IBUPROFEN 600 MG: 600 TABLET, FILM COATED ORAL at 00:45

## 2023-07-13 RX ADMIN — ACETAMINOPHEN 650 MG: 325 TABLET, FILM COATED ORAL at 22:43

## 2023-07-13 RX ADMIN — SERTRALINE 25 MG: 25 TABLET, FILM COATED ORAL at 09:37

## 2023-07-13 RX ADMIN — IBUPROFEN 600 MG: 600 TABLET, FILM COATED ORAL at 06:08

## 2023-07-13 RX ADMIN — ENOXAPARIN SODIUM 40 MG: 40 INJECTION SUBCUTANEOUS at 09:37

## 2023-07-13 RX ADMIN — DOCUSATE SODIUM 100 MG: 100 CAPSULE, LIQUID FILLED ORAL at 18:26

## 2023-07-13 NOTE — PLAN OF CARE
Problem: PAIN - ADULT  Goal: Verbalizes/displays adequate comfort level or baseline comfort level  Description: Interventions:  - Encourage patient to monitor pain and request assistance  - Assess pain using appropriate pain scale  - Administer analgesics based on type and severity of pain and evaluate response  - Implement non-pharmacological measures as appropriate and evaluate response  - Consider cultural and social influences on pain and pain management  - Notify physician/advanced practitioner if interventions unsuccessful or patient reports new pain  7/13/2023 0555 by Vikas White RN  Outcome: Progressing  7/13/2023 0555 by Vikas White RN  Outcome: Progressing     Problem: INFECTION - ADULT  Goal: Absence or prevention of progression during hospitalization  Description: INTERVENTIONS:  - Assess and monitor for signs and symptoms of infection  - Monitor lab/diagnostic results  - Monitor all insertion sites, i.e. indwelling lines, tubes, and drains  - Monitor endotracheal if appropriate and nasal secretions for changes in amount and color  - [place] appropriate cooling/warming therapies per order  - Administer medications as ordered  - Instruct and encourage patient and family to use good hand hygiene technique  - Identify and instruct in appropriate isolation precautions for identified infection/condition  7/13/2023 0555 by Vikas White RN  Outcome: Progressing  7/13/2023 0555 by Vikas White RN  Outcome: Progressing  Goal: Absence of fever/infection during neutropenic period  Description: INTERVENTIONS:  - Monitor WBC    7/13/2023 0555 by Vikas White RN  Outcome: Progressing  7/13/2023 0555 by Vikas White RN  Outcome: Progressing     Problem: SAFETY ADULT  Goal: Patient will remain free of falls  Description: INTERVENTIONS:  - Educate patient/family on patient safety including physical limitations  - Instruct patient to call for assistance with activity   - Consult OT/PT to assist with strengthening/mobility   - Keep Call bell within reach  - Keep bed low and locked with side rails adjusted as appropriate  - Keep care items and personal belongings within reach  - Initiate and maintain comfort rounds  - Make Fall Risk Sign visible to staff  - Apply yellow socks and bracelet for high fall risk patients  - Consider moving patient to room near nurses station  7/13/2023 0555 by Claudette Crane RN  Outcome: Progressing  7/13/2023 0555 by Claudette Crane RN  Outcome: Progressing  Goal: Maintain or return to baseline ADL function  Description: INTERVENTIONS:  -  Assess patient's ability to carry out ADLs; assess patient's baseline for ADL function and identify physical deficits which impact ability to perform ADLs (bathing, care of mouth/teeth, toileting, grooming, dressing, etc.)  - Assess/evaluate cause of self-care deficits   - Assess range of motion  - Assess patient's mobility; develop plan if impaired  - Assess patient's need for assistive devices and provide as appropriate  - Encourage maximum independence but intervene and supervise when necessary  - Involve family in performance of ADLs  - Assess for home care needs following discharge   - Consider OT consult to assist with ADL evaluation and planning for discharge  - Provide patient education as appropriate  7/13/2023 0555 by Claudette Crane RN  Outcome: Progressing  7/13/2023 0555 by Claudette Crane RN  Outcome: Progressing  Goal: Maintains/Returns to pre admission functional level  Description: INTERVENTIONS:  - Perform BMAT or MOVE assessment daily.   - Set and communicate daily mobility goal to care team and patient/family/caregiver.    - Collaborate with rehabilitation services on mobility goals if consulted  - Out of bed for toileting  - Record patient progress and toleration of activity level   7/13/2023 0555 by Claudette Crane RN  Outcome: Progressing  7/13/2023 0555 by Claudette Crane RN  Outcome: Progressing     Problem: Knowledge Deficit  Goal: Patient/family/caregiver demonstrates understanding of disease process, treatment plan, medications, and discharge instructions  Description: Complete learning assessment and assess knowledge base.   Interventions:  - Provide teaching at level of understanding  - Provide teaching via preferred learning methods  2023 by Tristen Saleem RN  Outcome: Progressing  2023 by Tristen Saleem RN  Outcome: Progressing     Problem: DISCHARGE PLANNING  Goal: Discharge to home or other facility with appropriate resources  Description: INTERVENTIONS:  - Identify barriers to discharge w/patient and caregiver  - Arrange for needed discharge resources and transportation as appropriate  - Identify discharge learning needs (meds, wound care, etc.)  - Arrange for interpretive services to assist at discharge as needed  - Refer to Case Management Department for coordinating discharge planning if the patient needs post-hospital services based on physician/advanced practitioner order or complex needs related to functional status, cognitive ability, or social support system  2023 by Tristen Saleem RN  Outcome: Progressing  2023 by Tristen Saleem RN  Outcome: Progressing     Problem: POSTPARTUM  Goal: Experiences normal postpartum course  Description: INTERVENTIONS:  - Monitor maternal vital signs  - Assess uterine involution and lochia  2023 by Tristen Saleem RN  Outcome: Progressing  2023 by Tristen Saleem RN  Outcome: Progressing  Goal: Appropriate maternal -  bonding  Description: INTERVENTIONS:  - Identify family support  - Assess for appropriate maternal/infant bonding   -Encourage maternal/infant bonding opportunities  - Referral to  or  as needed  2023 by Tristen Saleem RN  Outcome: Progressing  2023 by Tristen Saleem RN  Outcome: Progressing  Goal: Establishment of infant feeding pattern  Description: INTERVENTIONS:  - Assess breast/bottle feeding  - Refer to lactation as needed  7/13/2023 0555 by Cezar López RN  Outcome: Progressing  7/13/2023 0555 by Cezar López RN  Outcome: Progressing  Goal: Incision(s), wounds(s) or drain site(s) healing without S/S of infection  Description: INTERVENTIONS  - Assess and document dressing, incision, wound bed, drain sites and surrounding tissue  - Provide patient and family education  7/13/2023 0555 by Cezar López RN  Outcome: Progressing  7/13/2023 0555 by Cezar López RN  Outcome: Progressing

## 2023-07-13 NOTE — PLAN OF CARE
Problem: PAIN - ADULT  Goal: Verbalizes/displays adequate comfort level or baseline comfort level  Description: Interventions:  - Encourage patient to monitor pain and request assistance  - Assess pain using appropriate pain scale  - Administer analgesics based on type and severity of pain and evaluate response  - Implement non-pharmacological measures as appropriate and evaluate response  - Consider cultural and social influences on pain and pain management  - Notify physician/advanced practitioner if interventions unsuccessful or patient reports new pain  Outcome: Progressing     Problem: INFECTION - ADULT  Goal: Absence or prevention of progression during hospitalization  Description: INTERVENTIONS:  - Assess and monitor for signs and symptoms of infection  - Monitor lab/diagnostic results  - Monitor all insertion sites, i.e. indwelling lines, tubes, and drains  - Monitor endotracheal if appropriate and nasal secretions for changes in amount and color  - [place] appropriate cooling/warming therapies per order  - Administer medications as ordered  - Instruct and encourage patient and family to use good hand hygiene technique  - Identify and instruct in appropriate isolation precautions for identified infection/condition  Outcome: Progressing  Goal: Absence of fever/infection during neutropenic period  Description: INTERVENTIONS:  - Monitor WBC    Outcome: Progressing     Problem: SAFETY ADULT  Goal: Patient will remain free of falls  Description: INTERVENTIONS:  - Educate patient/family on patient safety including physical limitations  - Instruct patient to call for assistance with activity   - Consult OT/PT to assist with strengthening/mobility   - Keep Call bell within reach  - Keep bed low and locked with side rails adjusted as appropriate  - Keep care items and personal belongings within reach  - Initiate and maintain comfort rounds  Outcome: Progressing  Goal: Maintain or return to baseline ADL function  Description: INTERVENTIONS:  -  Assess patient's ability to carry out ADLs; assess patient's baseline for ADL function and identify physical deficits which impact ability to perform ADLs (bathing, care of mouth/teeth, toileting, grooming, dressing, etc.)  - Assess/evaluate cause of self-care deficits   - Assess range of motion  - Assess patient's mobility; develop plan if impaired  - Assess patient's need for assistive devices and provide as appropriate  - Encourage maximum independence but intervene and supervise when necessary  - Involve family in performance of ADLs  - Assess for home care needs following discharge   - Consider OT consult to assist with ADL evaluation and planning for discharge  - Provide patient education as appropriate  Outcome: Progressing  Goal: Maintains/Returns to pre admission functional level  Description: INTERVENTIONS:  - Perform BMAT or MOVE assessment daily.   - Set and communicate daily mobility goal to care team and patient/family/caregiver. - Collaborate with rehabilitation services on mobility goals if consulted  - Out of bed for toileting  - Record patient progress and toleration of activity level   Outcome: Progressing     Problem: Knowledge Deficit  Goal: Patient/family/caregiver demonstrates understanding of disease process, treatment plan, medications, and discharge instructions  Description: Complete learning assessment and assess knowledge base.   Interventions:  - Provide teaching at level of understanding  - Provide teaching via preferred learning methods  Outcome: Progressing     Problem: DISCHARGE PLANNING  Goal: Discharge to home or other facility with appropriate resources  Description: INTERVENTIONS:  - Identify barriers to discharge w/patient and caregiver  - Arrange for needed discharge resources and transportation as appropriate  - Identify discharge learning needs (meds, wound care, etc.)  - Arrange for interpretive services to assist at discharge as needed  - Refer to Case Management Department for coordinating discharge planning if the patient needs post-hospital services based on physician/advanced practitioner order or complex needs related to functional status, cognitive ability, or social support system  Outcome: Progressing     Problem: POSTPARTUM  Goal: Experiences normal postpartum course  Description: INTERVENTIONS:  - Monitor maternal vital signs  - Assess uterine involution and lochia  Outcome: Progressing  Goal: Appropriate maternal -  bonding  Description: INTERVENTIONS:  - Identify family support  - Assess for appropriate maternal/infant bonding   -Encourage maternal/infant bonding opportunities  - Referral to  or  as needed  Outcome: Progressing  Goal: Establishment of infant feeding pattern  Description: INTERVENTIONS:  - Assess breast/bottle feeding  - Refer to lactation as needed  Outcome: Progressing  Goal: Incision(s), wounds(s) or drain site(s) healing without S/S of infection  Description: INTERVENTIONS  - Assess and document dressing, incision, wound bed, drain sites and surrounding tissue  - Provide patient and family education  Outcome: Progressing

## 2023-07-13 NOTE — CASE MANAGEMENT
Case Management Progress Note    Patient name Aide Multani  Location /-09 MRN [id number]  : 1985 Date 2023       LOS (days): 2  Geometric Mean LOS (GMLOS) (days):   Days to GMLOS:        OBJECTIVE:        Current admission status: Inpatient  Preferred Pharmacy:   [id number], [address]  [facility] [place] [address]  [address] [facility]  Phone: [phone] Fax: [phone] [address] - [address]  [address] [place] [id number]  Phone: [phone] Fax: [phone]    Primary Care Provider: Shellie Mensah DO    Primary Insurance: BLUE CROSS  Secondary Insurance:     PROGRESS NOTE:        Cm attempted to meet with MOB at bedside. MOB currently visiting infant in NICU. Will attempt to meet again at a later time.

## 2023-07-13 NOTE — PROGRESS NOTES
Obstetrics Progress Note  Anuradha Perez 34 y.o. female MRN: [id number]  Unit/Bed#: -01 Encounter: [id number]    Assessment/Plan:  Postoperative day #2 s/p primary low transverse  delivery. Stable. Baby in NICU. By issue:  * Status post primary low transverse  section at 34 weeks   Assessment & Plan  • Routine postpartum care  • Encourage ambulation  • Encourage familial bonding  • Lactation support as needed  • Pain: Motrin/Tylenol around the clock, oxycodone if needed  • Pre-delivery Hgb 12.9 --> Post Delivery Hgb 11.7  • Passed void trial  • DVT Ppx: ambulation, SCDs    Iron deficiency anemia during pregnancy  Assessment & Plan  Lab Results   Component Value Date    HGB 11.7 2023       Gestational thrombocytopenia [facility])  Assessment & Plan  Lab Results   Component Value Date     (L) 2023     Continue to trend      NST (non-stress test) nonreactive-resolved as of 2023  Assessment & Plan  BPP 6/10 - +2 NST, CASIMIRO, fetal movement  Overnight observation for continuous monitoring  Plan to repeat BPP in AM - discussed indications for delivery if equivocal or worsening fetal status   Betamethasone -    Trisomy 21, fetal, affecting care of mother, antepartum-resolved as of 2023  Assessment & Plan  NIPT Positive, confirmed via amniocentesis  Fetal echo 23 with possible AV canal defect    Anticipate discharge  or 07/15. Subjective/Objective   Chief Complaint:   Post delivery. Subjective:   Pain: Controlled. Tolerating PO: yes. Voiding: yes. Flatus: No. Bowel Movement: No. Ambulating: yes. Chest pain: no. Shortness of breath: no. Leg pain: no. Lochia: within normal limits.  Infant feeding: Breast/donor breast.    Objective:   Vitals:   Temp:  [97.5 °F (36.4 °C)-98.2 °F (36.8 °C)] 98.1 °F (36.7 °C)  HR:  [64-88] 70  Resp:  [16-20] 20  BP: ()/(46-59) 109/59     Intake/Output Summary (Last 24 hours) at 2023 0715  Last data filed at 2023 1248  Gross per 24 hour   Intake --   Output 1050 ml   Net -1050 ml       Physical Exam:   -General: alert and oriented x 3, in no apparent distress  -Cardiovascular: regular rate and rhythm  -Pulmonary: normal effort, clear to auscultation bilaterally   -Abdomen/Pelvis: soft, non-tender, non-distended, no rebound or guarding. Uterine fundus firm and non-tender, -2 cm below the umbilicus.  Incision: CDI  -Extremities: Nontender    Lab Results   Component Value Date    WBC 15.31 (H) 07/12/2023    HGB 11.7 07/12/2023    HCT 34.6 (L) 07/12/2023    MCV 93 07/12/2023     (L) 07/12/2023         Kevin Beal MD  PGY-I, OBGYN  7/13/2023, 7:15 AM

## 2023-07-14 PROCEDURE — 99024 POSTOP FOLLOW-UP VISIT: CPT | Performed by: OBSTETRICS & GYNECOLOGY

## 2023-07-14 RX ADMIN — SIMETHICONE 80 MG: 80 TABLET, CHEWABLE ORAL at 00:38

## 2023-07-14 RX ADMIN — ACETAMINOPHEN 650 MG: 325 TABLET, FILM COATED ORAL at 10:12

## 2023-07-14 RX ADMIN — IBUPROFEN 600 MG: 600 TABLET, FILM COATED ORAL at 06:22

## 2023-07-14 RX ADMIN — DOCUSATE SODIUM 100 MG: 100 CAPSULE, LIQUID FILLED ORAL at 10:12

## 2023-07-14 RX ADMIN — SERTRALINE 25 MG: 25 TABLET, FILM COATED ORAL at 10:12

## 2023-07-14 RX ADMIN — IBUPROFEN 600 MG: 600 TABLET, FILM COATED ORAL at 00:32

## 2023-07-14 RX ADMIN — ACETAMINOPHEN 650 MG: 325 TABLET, FILM COATED ORAL at 16:20

## 2023-07-14 RX ADMIN — IBUPROFEN 600 MG: 600 TABLET, FILM COATED ORAL at 12:24

## 2023-07-14 RX ADMIN — IBUPROFEN 600 MG: 600 TABLET, FILM COATED ORAL at 18:15

## 2023-07-14 RX ADMIN — ACETAMINOPHEN 650 MG: 325 TABLET, FILM COATED ORAL at 04:34

## 2023-07-14 RX ADMIN — ENOXAPARIN SODIUM 40 MG: 40 INJECTION SUBCUTANEOUS at 10:12

## 2023-07-14 RX ADMIN — ACETAMINOPHEN 650 MG: 325 TABLET, FILM COATED ORAL at 22:38

## 2023-07-14 NOTE — PROGRESS NOTES
Progress Note - OB/GYN  Andrey Foley 34 y.o. female MRN: [id number]  Unit/Bed#: -01 Encounter: [id number]    Assessment and Plan   Andrey Foley is a patient of: José Miguel. She is PPD# 3 s/p  primary  section, low transverse incision  Recovering well and is stable       Iron deficiency anemia during pregnancy  Assessment & Plan  Lab Results   Component Value Date    HGB 11.7 2023       Gestational thrombocytopenia [facility])  Assessment & Plan  Lab Results   Component Value Date     (L) 2023     Continue to trend      * Status post primary low transverse  section at 34 weeks   Assessment & Plan  • Routine postpartum care  • Encourage ambulation  • Encourage familial bonding  • Lactation support as needed  • Pain: Motrin/Tylenol around the clock, oxycodone if needed  • Pre-delivery Hgb 12.9 --> Post Delivery Hgb 11.7  • Passed void trial  • DVT Ppx: ambulation, SCDs    NST (non-stress test) nonreactive-resolved as of 2023  Assessment & Plan  BPP 6/10 - +2 NST, CASIMIRO, fetal movement  Overnight observation for continuous monitoring  Plan to repeat BPP in AM - discussed indications for delivery if equivocal or worsening fetal status   Betamethasone -    Trisomy 21, fetal, affecting care of mother, antepartum-resolved as of 2023  Assessment & Plan  NIPT Positive, confirmed via amniocentesis  Fetal echo 23 with possible AV canal defect        Disposition    - Anticipate discharge home on PPD# 4,  Baby in NICU      Subjective/Objective     Chief Complaint: Postpartum State     Subjective:    Andrey Foley is PPD/POD#3 s/p  primary  section, low transverse incision. She has no current complaints. Pain is well controlled. Patient is currently voiding. She is ambulating. Patient is currently passing flatus and has had bowel movement. She is tolerating PO, and denies nausea or vomitting.  Patient denies fever, chills, chest pain, shortness of breath, or calf tenderness. Lochia is normal. She is  Breastfeeding. She is recovering well and is stable. Vitals:   /58 (BP Location: Left arm)   Pulse 71   Temp 98.7 °F (37.1 °C) (Oral)   Resp 20   Ht 5' 6" (1.676 m)   Wt 79.8 kg (176 lb)   LMP 11/10/2022 (Exact Date)   SpO2 100%   Breastfeeding Yes   BMI 28.41 kg/m²     No intake or output data in the 24 hours ending 07/14/23 0624    Invasive Devices     None                 Physical Exam:   GEN: Daksha Keenan appears well, alert and oriented x 3, pleasant and cooperative   CARDIO: RRR, no murmurs or rubs  RESP:  CTAB, no wheezes or rales  ABDOMEN: soft, no tenderness, no distention, fundus @ u-1, Incision C/D/I, minimal ecchymosis noted at superior aspect of incision  EXTREMITIES: SCDs on, non tender, no erythema      Labs:     Hemoglobin   Date Value Ref Range Status   07/12/2023 11.7 11.5 - 15.4 g/dL Final   07/11/2023 12.9 11.5 - 15.4 g/dL Final   09/12/2014 11.2 (L) 11.5 - 15.4 g/dL Final     WBC   Date Value Ref Range Status   07/12/2023 15.31 (H) 4.31 - 10.16 Thousand/uL Final   07/11/2023 7.73 4.31 - 10.16 Thousand/uL Final   09/12/2014 6.81 4.31 - 10.16 Thousand/uL Final     Platelets   Date Value Ref Range Status   07/12/2023 129 (L) 149 - 390 Thousands/uL Final   07/11/2023 117 (L) 149 - 390 Thousands/uL Final   09/12/2014 109 (L) 149 - 390 Thousand/uL Final     Creatinine   Date Value Ref Range Status   04/13/2023 0.56 (L) 0.60 - 1.30 mg/dL Final     Comment:     Standardized to IDMS reference method     AST   Date Value Ref Range Status   04/13/2023 16 13 - 39 U/L Final     ALT   Date Value Ref Range Status   04/13/2023 13 7 - 52 U/L Final     Comment:     Specimen collection should occur prior to Sulfasalazine administration due to the potential for falsely depressed results.            Cori Left, DO  7/14/2023  6:24 AM

## 2023-07-14 NOTE — LACTATION NOTE
This note was copied from a baby's chart. Met with Cassandra and Sulaiman and FOB in NICU at [facility] care Ashlyn dhillon would like to attempt to latch Sulaiman to the breast.  She states that he has latched and fed 2x at the breast so far. Worked on positioning infant up at chest level and starting to feed infant with nose arriving at the nipple. Then, using areolar compression to achieve a deep latch that is comfortable and exchanges optimum amounts of milk. I offered suggestions on positioning, for a more optimal latch, showed mom proper positioning, ear, shoulder hip in line, baby's arms open, not in between mom and baby, nose to nipple, hand at base of head/neck. Sulaiman was too sleepy to latch so Cassandra hand expressed a good amount of colostrum right into his mouth. This was followed by a bottle of breast milk. Cassandra is pumping q3hrs during the day and over night pumping at least 1-2 times she states that her pumping volumes are increasing to meet his intake volumes, about 20ml at this time. I encouraged her to spend lots of time skin to skin with Sulaiman and to continue to offer the breast at each feeding and continue to pump. I encouraged the parents to call for assistance as needed. NICU nurse, Jacquelyn Tristan aware of feeding attempt and outcome.

## 2023-07-14 NOTE — PLAN OF CARE
Problem: PAIN - ADULT  Goal: Verbalizes/displays adequate comfort level or baseline comfort level  Description: Interventions:  - Encourage patient to monitor pain and request assistance  - Assess pain using appropriate pain scale  - Administer analgesics based on type and severity of pain and evaluate response  - Implement non-pharmacological measures as appropriate and evaluate response  - Consider cultural and social influences on pain and pain management  - Notify physician/advanced practitioner if interventions unsuccessful or patient reports new pain  Outcome: Progressing     Problem: INFECTION - ADULT  Goal: Absence or prevention of progression during hospitalization  Description: INTERVENTIONS:  - Assess and monitor for signs and symptoms of infection  - Monitor lab/diagnostic results  - Monitor all insertion sites, i.e. indwelling lines, tubes, and drains  - Monitor endotracheal if appropriate and nasal secretions for changes in amount and color  - [place] appropriate cooling/warming therapies per order  - Administer medications as ordered  - Instruct and encourage patient and family to use good hand hygiene technique  - Identify and instruct in appropriate isolation precautions for identified infection/condition  Outcome: Progressing  Goal: Absence of fever/infection during neutropenic period  Description: INTERVENTIONS:  - Monitor WBC    Outcome: Progressing     Problem: SAFETY ADULT  Goal: Patient will remain free of falls  Description: INTERVENTIONS:  - Educate patient/family on patient safety including physical limitations  - Instruct patient to call for assistance with activity   - Consult OT/PT to assist with strengthening/mobility   - Keep Call bell within reach  - Keep bed low and locked with side rails adjusted as appropriate  - Keep care items and personal belongings within reach  - Initiate and maintain comfort rounds  - Obtain necessary fall risk management equipment  - Apply yellow socks and bracelet for high fall risk patients  - Consider moving patient to room near nurses station  Outcome: Progressing  Goal: Maintain or return to baseline ADL function  Description: INTERVENTIONS:  -  Assess patient's ability to carry out ADLs; assess patient's baseline for ADL function and identify physical deficits which impact ability to perform ADLs (bathing, care of mouth/teeth, toileting, grooming, dressing, etc.)  - Assess/evaluate cause of self-care deficits   - Assess range of motion  - Assess patient's mobility; develop plan if impaired  - Assess patient's need for assistive devices and provide as appropriate  - Encourage maximum independence but intervene and supervise when necessary  - Involve family in performance of ADLs  - Assess for home care needs following discharge   - Consider OT consult to assist with ADL evaluation and planning for discharge  - Provide patient education as appropriate  Outcome: Progressing  Goal: Maintains/Returns to pre admission functional level  Description: INTERVENTIONS:  - Perform BMAT or MOVE assessment daily.   - Set and communicate daily mobility goal to care team and patient/family/caregiver. - Collaborate with rehabilitation services on mobility goals if consulted  - Ambulate patient   - Out of bed to chair   - Out of bed for meals   - Out of bed for toileting  - Record patient progress and toleration of activity level   Outcome: Progressing     Problem: Knowledge Deficit  Goal: Patient/family/caregiver demonstrates understanding of disease process, treatment plan, medications, and discharge instructions  Description: Complete learning assessment and assess knowledge base.   Interventions:  - Provide teaching at level of understanding  - Provide teaching via preferred learning methods  Outcome: Progressing     Problem: DISCHARGE PLANNING  Goal: Discharge to home or other facility with appropriate resources  Description: INTERVENTIONS:  - Identify barriers to discharge w/patient and caregiver  - Arrange for needed discharge resources and transportation as appropriate  - Identify discharge learning needs (meds, wound care, etc.)  - Arrange for interpretive services to assist at discharge as needed  - Refer to Case Management Department for coordinating discharge planning if the patient needs post-hospital services based on physician/advanced practitioner order or complex needs related to functional status, cognitive ability, or social support system  Outcome: Progressing     Problem: POSTPARTUM  Goal: Experiences normal postpartum course  Description: INTERVENTIONS:  - Monitor maternal vital signs  - Assess uterine involution and lochia  Outcome: Progressing  Goal: Appropriate maternal -  bonding  Description: INTERVENTIONS:  - Identify family support  - Assess for appropriate maternal/infant bonding   -Encourage maternal/infant bonding opportunities  - Referral to  or  as needed  Outcome: Progressing  Goal: Establishment of infant feeding pattern  Description: INTERVENTIONS:  - Assess breast/bottle feeding  - Refer to lactation as needed  Outcome: Progressing  Goal: Incision(s), wounds(s) or drain site(s) healing without S/S of infection  Description: INTERVENTIONS  - Assess and document dressing, incision, wound bed, drain sites and surrounding tissue  - Provide patient and family education  - Perform skin care/dressing changes   Outcome: Progressing

## 2023-07-14 NOTE — CASE MANAGEMENT
Case Management Progress Note    Patient name Andrey Foley  Location /-14 MRN [id number]  : 1985 Date 2023       LOS (days): 3  Geometric Mean LOS (GMLOS) (days):   Days to GMLOS:        OBJECTIVE:        Current admission status: Inpatient  Preferred Pharmacy:   [id number], [address]  [address] [place] [address] [address]  [facility] [address]  Phone: [phone] Fax: [phone] #223 - Arabella Essence Li  [address] [facility] [address]  Phone: [phone] Fax: [phone]    Primary Care Provider: Leanna Parrish DO    Primary Insurance: BLUE CROSS  Secondary Insurance:     PROGRESS NOTE:      CM met with MOB to introduce CM services, complete assessment, and provide CM contact info. MOB reported the following:    Assessment:  • Consult reason: NICU Admission  • Gestational Age at Birth: 29 Weeks + 5 Days  • MOB Name (& age if teen):   Cassandra  • FOB Name (& age if teen MOB):   Yen Legacy  • Other Legal Guardian(s) for Baby:    N/A  • Other Children:   6 other children  - 11 y/o M, 15 y/o M, 15 y/o F, 7 y/o F, 7 y/o F, 1 y/o M  • Housing Plan/Lives with:   MOB and FOB live together with their other 6 children  • Insurance Coverage/Plan for Baby: MOB verbalizes that they will contact FOB's insurance to add baby ASAP.   • Support System: Family, Friends, Spouse/Significant Other and Christian  • Care Items: Car Seat, Crib/Bassinet ([employer]), Diapers/Wipes and Clothing  • Method of Feeding: Breast Feeding, Bottle Feeding and Breast Milk  • Breast Pump: Breast pump obtained prior to admission  • Government Assistance Programs: None  •  Arrangements: MOB  • Current Employment/Schooling: MOB employed Part Time, MOB staying home with baby and FOB employed Full Time  • Mental Health History and/or Treatment:     • Substance Use History and/or Treatment:     • Urine Drug Screen Results: Not Applicable  • Children & Youth History: None  • Current Legal Issues: N/A  • Domestic/Intimate Partner Violence History: Denies. • NICU Resources: NICU Packet Provided    Discharge Plan:  • Pediatrician:   Dr. Sonya Bridges - [facility]  • Prenatal/ Care:  [place]  • Transportation Plan: MOB has a vehicle and FOB has a vehicle    Follow-Up Needed from Care Management:          CM met with MOB at bedside to complete initial NICU assessment and provider resources for baby with Trisomy 21. MOB verbalizes being unsure if she should take this baby to the pediatrician her other children go to or if she should switch to a pediatrician who is more well versed in Down Syndrome children. CM reviews that it is MOB's choice how she wants to manage baby's care and reviewed that referral will likely be made to developmental pediatrics who would special is in Trisomy 21 as well. CM also reviewed Early Intervention referral would be made as well. CM provided information on local Down Syndrome Center, National Down Syndrome Society, and [facility] as well as a NICU resource packet. CM discuss Social Security [facility] Program, applying for secondary MA for baby, Adding baby to insurance plan, and Early Intervention info. MOB offers no further questions, plan for baby to d/c home with parents when medically able.

## 2023-07-15 VITALS
WEIGHT: 176 LBS | HEIGHT: 66 IN | HEART RATE: 65 BPM | SYSTOLIC BLOOD PRESSURE: 115 MMHG | RESPIRATION RATE: 16 BRPM | BODY MASS INDEX: 28.28 KG/M2 | OXYGEN SATURATION: 98 % | DIASTOLIC BLOOD PRESSURE: 60 MMHG | TEMPERATURE: 97.7 F

## 2023-07-15 PROBLEM — Z98.890 STATUS POST HYSTEROSCOPY: Status: RESOLVED | Noted: 2022-06-23 | Resolved: 2023-07-15

## 2023-07-15 PROCEDURE — 99024 POSTOP FOLLOW-UP VISIT: CPT | Performed by: OBSTETRICS & GYNECOLOGY

## 2023-07-15 RX ORDER — DOCUSATE SODIUM 100 MG/1
100 CAPSULE, LIQUID FILLED ORAL 2 TIMES DAILY
Qty: 10 CAPSULE | Refills: 0 | OUTPATIENT
Start: 2023-07-15

## 2023-07-15 RX ORDER — ACETAMINOPHEN 325 MG/1
650 TABLET ORAL EVERY 6 HOURS SCHEDULED
Refills: 0
Start: 2023-07-15

## 2023-07-15 RX ORDER — IBUPROFEN 600 MG/1
600 TABLET ORAL EVERY 6 HOURS SCHEDULED
Qty: 30 TABLET | Refills: 0 | OUTPATIENT
Start: 2023-07-15

## 2023-07-15 RX ORDER — IBUPROFEN 600 MG/1
600 TABLET ORAL EVERY 6 HOURS SCHEDULED
Qty: 30 TABLET | Refills: 0
Start: 2023-07-15

## 2023-07-15 RX ORDER — DOCUSATE SODIUM 100 MG/1
100 CAPSULE, LIQUID FILLED ORAL 2 TIMES DAILY
Refills: 0
Start: 2023-07-15

## 2023-07-15 RX ORDER — ACETAMINOPHEN 325 MG/1
650 TABLET ORAL EVERY 4 HOURS PRN
Qty: 30 TABLET | Refills: 0 | OUTPATIENT
Start: 2023-07-15

## 2023-07-15 RX ORDER — OXYCODONE HYDROCHLORIDE 5 MG/1
5 TABLET ORAL EVERY 4 HOURS PRN
Qty: 10 TABLET | Refills: 0 | OUTPATIENT
Start: 2023-07-15 | End: 2023-07-25

## 2023-07-15 RX ADMIN — ACETAMINOPHEN 650 MG: 325 TABLET, FILM COATED ORAL at 09:17

## 2023-07-15 RX ADMIN — ACETAMINOPHEN 650 MG: 325 TABLET, FILM COATED ORAL at 17:42

## 2023-07-15 RX ADMIN — SERTRALINE 25 MG: 25 TABLET, FILM COATED ORAL at 09:17

## 2023-07-15 RX ADMIN — IBUPROFEN 600 MG: 600 TABLET, FILM COATED ORAL at 06:07

## 2023-07-15 RX ADMIN — IBUPROFEN 600 MG: 600 TABLET, FILM COATED ORAL at 17:43

## 2023-07-15 RX ADMIN — ENOXAPARIN SODIUM 40 MG: 40 INJECTION SUBCUTANEOUS at 09:17

## 2023-07-15 RX ADMIN — IBUPROFEN 600 MG: 600 TABLET, FILM COATED ORAL at 12:44

## 2023-07-15 RX ADMIN — DOCUSATE SODIUM 100 MG: 100 CAPSULE, LIQUID FILLED ORAL at 09:17

## 2023-07-15 RX ADMIN — ACETAMINOPHEN 650 MG: 325 TABLET, FILM COATED ORAL at 04:32

## 2023-07-15 RX ADMIN — DOCUSATE SODIUM 100 MG: 100 CAPSULE, LIQUID FILLED ORAL at 17:43

## 2023-07-15 RX ADMIN — IBUPROFEN 600 MG: 600 TABLET, FILM COATED ORAL at 00:37

## 2023-07-15 NOTE — PLAN OF CARE
Problem: PAIN - ADULT  Goal: Verbalizes/displays adequate comfort level or baseline comfort level  Description: Interventions:  - Encourage patient to monitor pain and request assistance  - Assess pain using appropriate pain scale  - Administer analgesics based on type and severity of pain and evaluate response  - Implement non-pharmacological measures as appropriate and evaluate response  - Consider cultural and social influences on pain and pain management  - Notify physician/advanced practitioner if interventions unsuccessful or patient reports new pain  Outcome: Completed     Problem: INFECTION - ADULT  Goal: Absence or prevention of progression during hospitalization  Description: INTERVENTIONS:  - Assess and monitor for signs and symptoms of infection  - Monitor lab/diagnostic results  - Monitor all insertion sites, i.e. indwelling lines, tubes, and drains  - Monitor endotracheal if appropriate and nasal secretions for changes in amount and color  - [place] appropriate cooling/warming therapies per order  - Administer medications as ordered  - Instruct and encourage patient and family to use good hand hygiene technique  - Identify and instruct in appropriate isolation precautions for identified infection/condition  Outcome: Completed  Goal: Absence of fever/infection during neutropenic period  Description: INTERVENTIONS:  - Monitor WBC    Outcome: Completed     Problem: SAFETY ADULT  Goal: Patient will remain free of falls  Description: INTERVENTIONS:  - Educate patient/family on patient safety including physical limitations  - Instruct patient to call for assistance with activity   - Consult OT/PT to assist with strengthening/mobility   - Keep Call bell within reach  - Keep bed low and locked with side rails adjusted as appropriate  - Keep care items and personal belongings within reach  - Initiate and maintain comfort rounds  - Make Fall Risk Sign visible to staff  - Apply yellow socks and bracelet for high fall risk patients  - Consider moving patient to room near nurses station  Outcome: Completed  Goal: Maintain or return to baseline ADL function  Description: INTERVENTIONS:  -  Assess patient's ability to carry out ADLs; assess patient's baseline for ADL function and identify physical deficits which impact ability to perform ADLs (bathing, care of mouth/teeth, toileting, grooming, dressing, etc.)  - Assess/evaluate cause of self-care deficits   - Assess range of motion  - Assess patient's mobility; develop plan if impaired  - Assess patient's need for assistive devices and provide as appropriate  - Encourage maximum independence but intervene and supervise when necessary  - Involve family in performance of ADLs  - Assess for home care needs following discharge   - Consider OT consult to assist with ADL evaluation and planning for discharge  - Provide patient education as appropriate  Outcome: Completed  Goal: Maintains/Returns to pre admission functional level  Description: INTERVENTIONS:  - Perform BMAT or MOVE assessment daily.   - Set and communicate daily mobility goal to care team and patient/family/caregiver. - Collaborate with rehabilitation services on mobility goals if consulted  - Out of bed for toileting  - Record patient progress and toleration of activity level   Outcome: Completed     Problem: Knowledge Deficit  Goal: Patient/family/caregiver demonstrates understanding of disease process, treatment plan, medications, and discharge instructions  Description: Complete learning assessment and assess knowledge base.   Interventions:  - Provide teaching at level of understanding  - Provide teaching via preferred learning methods  Outcome: Completed     Problem: DISCHARGE PLANNING  Goal: Discharge to home or other facility with appropriate resources  Description: INTERVENTIONS:  - Identify barriers to discharge w/patient and caregiver  - Arrange for needed discharge resources and transportation as appropriate  - Identify discharge learning needs (meds, wound care, etc.)  - Arrange for interpretive services to assist at discharge as needed  - Refer to Case Management Department for coordinating discharge planning if the patient needs post-hospital services based on physician/advanced practitioner order or complex needs related to functional status, cognitive ability, or social support system  Outcome: Completed     Problem: POSTPARTUM  Goal: Experiences normal postpartum course  Description: INTERVENTIONS:  - Monitor maternal vital signs  - Assess uterine involution and lochia  Outcome: Completed  Goal: Appropriate maternal -  bonding  Description: INTERVENTIONS:  - Identify family support  - Assess for appropriate maternal/infant bonding   -Encourage maternal/infant bonding opportunities  - Referral to  or  as needed  Outcome: Completed  Goal: Establishment of infant feeding pattern  Description: INTERVENTIONS:  - Assess breast/bottle feeding  - Refer to lactation as needed  Outcome: Completed  Goal: Incision(s), wounds(s) or drain site(s) healing without S/S of infection  Description: INTERVENTIONS  - Assess and document dressing, incision, wound bed, drain sites and surrounding tissue  - Provide patient and family education  Outcome: Completed

## 2023-07-15 NOTE — PROGRESS NOTES
Progress Note - OB/GYN   Opal Tovar 34 y.o. female MRN: [id number]  Unit/Bed#: -01 Encounter: [id number]      Assessment/Plan    Opal Tovar is a 34 y.o. [id number] who is PPD 4 s/p LTCS at 34w5d     Iron deficiency anemia during pregnancy  Assessment & Plan  Lab Results   Component Value Date    HGB 11.7 2023       Gestational thrombocytopenia [facility])  Assessment & Plan  Lab Results   Component Value Date     (L) 2023         * Status post primary low transverse  section at 34 weeks   Assessment & Plan  • Routine postpartum care  • Encourage ambulation  • Encourage familial bonding  • Lactation support as needed  • Pain: Motrin/Tylenol around the clock, oxycodone if needed  • Pre-delivery Hgb 12.9 --> Post Delivery Hgb 11.7  • Passed void trial  • DVT Ppx: ambulation, SCDs        Disposition: Anticipate discharge home postpartum Day #4  Barriers to discharge: none      Subjective/Objective     Subjective: Postpartum state    Pain: no  Tolerating PO: yes  Voiding: yes  Flatus: yes  BM: yes  Ambulating: yes  Breastfeeding: Bottle feeding and Using breast pump  Chest pain: no  Shortness of breath: no  Leg pain: no  Lochia: decreasing    Objective:     Vitals:  Vitals:    23 0104 23 0817 23 1625 07/15/23 0037   BP: 101/58 108/61 127/74 115/60   BP Location: Left arm  Right arm Left arm   Pulse: 71 71 63 65   Resp: 20 20 18 16   Temp: 98.7 °F (37.1 °C) 98.3 °F (36.8 °C) 98.3 °F (36.8 °C) 97.7 °F (36.5 °C)   TempSrc: Oral Oral Oral Oral   SpO2: 100%  100% 98%   Weight:       Height:           Physical Exam:   GEN: appears well, alert and oriented x 3, pleasant and cooperative   CV: Regular rate  RESP: non labored breathing  ABDOMEN: soft, no tenderness, no distention, Uterine fundus firm and non-tender, -1 cm below the umbilicus, incision c/d/i  EXTREMITIES: non-tender  NEURO Alert and oriented to person, place, and time.        Lab Results   Component Value Date WBC 15.31 (H) 07/12/2023    HGB 11.7 07/12/2023    HCT 34.6 (L) 07/12/2023    MCV 93 07/12/2023     (L) 07/12/2023         Saeid Perez MD  Obstetrics & Gynecology  07/15/23

## 2023-07-15 NOTE — PLAN OF CARE
Problem: PAIN - ADULT  Goal: Verbalizes/displays adequate comfort level or baseline comfort level  Description: Interventions:  - Encourage patient to monitor pain and request assistance  - Assess pain using appropriate pain scale  - Administer analgesics based on type and severity of pain and evaluate response  - Implement non-pharmacological measures as appropriate and evaluate response  - Consider cultural and social influences on pain and pain management  - Notify physician/advanced practitioner if interventions unsuccessful or patient reports new pain  Outcome: Progressing     Problem: INFECTION - ADULT  Goal: Absence or prevention of progression during hospitalization  Description: INTERVENTIONS:  - Assess and monitor for signs and symptoms of infection  - Monitor lab/diagnostic results  - Monitor all insertion sites, i.e. indwelling lines, tubes, and drains  - Monitor endotracheal if appropriate and nasal secretions for changes in amount and color  - [place] appropriate cooling/warming therapies per order  - Administer medications as ordered  - Instruct and encourage patient and family to use good hand hygiene technique  - Identify and instruct in appropriate isolation precautions for identified infection/condition  Outcome: Progressing  Goal: Absence of fever/infection during neutropenic period  Description: INTERVENTIONS:  - Monitor WBC    Outcome: Progressing     Problem: SAFETY ADULT  Goal: Patient will remain free of falls  Description: INTERVENTIONS:  - Educate patient/family on patient safety including physical limitations  - Instruct patient to call for assistance with activity   - Consult OT/PT to assist with strengthening/mobility   - Keep Call bell within reach  - Keep bed low and locked with side rails adjusted as appropriate  - Keep care items and personal belongings within reach  - Initiate and maintain comfort rounds  - Obtain necessary fall risk management equipment  - Apply yellow socks and bracelet for high fall risk patients  - Consider moving patient to room near nurses station  Outcome: Progressing  Goal: Maintain or return to baseline ADL function  Description: INTERVENTIONS:  -  Assess patient's ability to carry out ADLs; assess patient's baseline for ADL function and identify physical deficits which impact ability to perform ADLs (bathing, care of mouth/teeth, toileting, grooming, dressing, etc.)  - Assess/evaluate cause of self-care deficits   - Assess range of motion  - Assess patient's mobility; develop plan if impaired  - Assess patient's need for assistive devices and provide as appropriate  - Encourage maximum independence but intervene and supervise when necessary  - Involve family in performance of ADLs  - Assess for home care needs following discharge   - Consider OT consult to assist with ADL evaluation and planning for discharge  - Provide patient education as appropriate  Outcome: Progressing  Goal: Maintains/Returns to pre admission functional level  Description: INTERVENTIONS:  - Perform BMAT or MOVE assessment daily.   - Set and communicate daily mobility goal to care team and patient/family/caregiver. - Collaborate with rehabilitation services on mobility goals if consulted  - Stand patient   - Ambulate patient   - Out of bed to chair   - Out of bed for meals   - Out of bed for toileting  - Record patient progress and toleration of activity level   Outcome: Progressing     Problem: Knowledge Deficit  Goal: Patient/family/caregiver demonstrates understanding of disease process, treatment plan, medications, and discharge instructions  Description: Complete learning assessment and assess knowledge base.   Interventions:  - Provide teaching at level of understanding  - Provide teaching via preferred learning methods  Outcome: Progressing     Problem: DISCHARGE PLANNING  Goal: Discharge to home or other facility with appropriate resources  Description: INTERVENTIONS:  - Identify barriers to discharge w/patient and caregiver  - Arrange for needed discharge resources and transportation as appropriate  - Identify discharge learning needs (meds, wound care, etc.)  - Arrange for interpretive services to assist at discharge as needed  - Refer to Case Management Department for coordinating discharge planning if the patient needs post-hospital services based on physician/advanced practitioner order or complex needs related to functional status, cognitive ability, or social support system  Outcome: Progressing     Problem: POSTPARTUM  Goal: Experiences normal postpartum course  Description: INTERVENTIONS:  - Monitor maternal vital signs  - Assess uterine involution and lochia  Outcome: Progressing  Goal: Appropriate maternal -  bonding  Description: INTERVENTIONS:  - Identify family support  - Assess for appropriate maternal/infant bonding   -Encourage maternal/infant bonding opportunities  - Referral to  or  as needed  Outcome: Progressing  Goal: Establishment of infant feeding pattern  Description: INTERVENTIONS:  - Assess breast/bottle feeding  - Refer to lactation as needed  Outcome: Progressing  Goal: Incision(s), wounds(s) or drain site(s) healing without S/S of infection  Description: INTERVENTIONS  - Assess and document dressing, incision, wound bed, drain sites and surrounding tissue  - Provide patient and family education  - Perform skin care/dressing changes   Outcome: Progressing

## 2023-07-17 PROCEDURE — 88307 TISSUE EXAM BY PATHOLOGIST: CPT | Performed by: PATHOLOGY

## 2023-07-19 ENCOUNTER — POSTPARTUM VISIT (OUTPATIENT)
Dept: OBGYN CLINIC | Facility: CLINIC | Age: 38
End: 2023-07-19

## 2023-07-19 VITALS
BODY MASS INDEX: 26.03 KG/M2 | WEIGHT: 162 LBS | DIASTOLIC BLOOD PRESSURE: 74 MMHG | HEIGHT: 66 IN | SYSTOLIC BLOOD PRESSURE: 128 MMHG

## 2023-07-19 DIAGNOSIS — Z98.891 STATUS POST PRIMARY LOW TRANSVERSE CESAREAN SECTION: Primary | ICD-10-CM

## 2023-07-19 DIAGNOSIS — B37.9 CANDIDA ALBICANS INFECTION: ICD-10-CM

## 2023-07-19 PROCEDURE — 99024 POSTOP FOLLOW-UP VISIT: CPT | Performed by: OBSTETRICS & GYNECOLOGY

## 2023-07-19 RX ORDER — FLUCONAZOLE 150 MG/1
150 TABLET ORAL ONCE
Qty: 1 TABLET | Refills: 0 | Status: SHIPPED | OUTPATIENT
Start: 2023-07-19 | End: 2023-07-19

## 2023-07-19 NOTE — PROGRESS NOTES
Dilcia Patricia was seen today for postpartum care. Diagnoses and all orders for this visit:    Status post primary low transverse  section at 34 weeks     Candida albicans infection  -     fluconazole (DIFLUCAN) 150 mg tablet; Take 1 tablet (150 mg total) by mouth once for 1 dose         Doing well postoperatively. Plan    1. Continue current medications. 2. Wound care discussed. 3. Increase activity as tolerated. 4. Anticipated return to work: 8 - 12 weeks post partum. 5. Follow up at 6 weeks post delivery for postpartum checkup. Osmani Magdaleno is a 34 y.o. y.o. female who presents approximately one week post discharge following a post  on . She is eating a regular diet without difficulty. Bowel movements are normal. She has pain at times on  her right side. She has some bruising on her lower abdomen. The patient is breastfeeding/pumping -  Baby is in NICU, doing well, just working on feeds. Discussed limits at home on lifting and activity.        The following portions of the patient's history were reviewed and updated as appropriate: allergies, current medications, past family history, past medical history, past social history, past surgical history and problem list.    Allergies  Cefaclor    Medications    Current Outpatient Medications:   •  acetaminophen (TYLENOL) 325 mg tablet, Take 2 tablets (650 mg total) by mouth every 6 (six) hours, Disp: , Rfl: 0  •  cetirizine (ZyrTEC) 10 mg tablet, Take 10 mg by mouth daily as needed for allergies, Disp: , Rfl:   •  docusate sodium (COLACE) 100 mg capsule, Take 1 capsule (100 mg total) by mouth 2 (two) times a day, Disp: , Rfl: 0  •  fluconazole (DIFLUCAN) 150 mg tablet, Take 1 tablet (150 mg total) by mouth once for 1 dose, Disp: 1 tablet, Rfl: 0  •  fluocinolone acetonide (DERMOTIC) 0.01 % otic oil, Administer 1 drop into both ears once a week, Disp: , Rfl:   •  ibuprofen (MOTRIN) 600 mg tablet, Take 1 tablet (600 mg total) by mouth every 6 (six) hours, Disp: 30 tablet, Rfl: 0  •  Prenatal Vit-Fe Fumarate-FA (PRENATAL 1 PLUS 1 PO), Take by mouth, Disp: , Rfl:   •  sertraline (ZOLOFT) 25 mg tablet, TAKE 1 TABLET (25 MG TOTAL) BY MOUTH DAILY. , Disp: 90 tablet, Rfl: 2      Review of Systems  Denies Fevers/chills/N/V/constipation/ excessive vaginal bleeding/excessive pain/dysuria/frequency of urination. Also as noted in HPI.       Objective     /74   Ht 5' 6" (1.676 m)   Wt 73.5 kg (162 lb)   LMP 11/10/2022 (Exact Date)   Breastfeeding Yes   BMI 26.15 kg/m²     General: alert and oriented, in no acute distress  Abdomen: soft, non-tender  Incision: healing well, no drainage, no erythema, no hernia, no seroma, no swelling, no dehiscence, incision well approximated

## 2023-07-27 ENCOUNTER — OFFICE VISIT (OUTPATIENT)
Dept: URGENT CARE | Facility: CLINIC | Age: 38
End: 2023-07-27
Payer: COMMERCIAL

## 2023-07-27 VITALS
OXYGEN SATURATION: 98 % | WEIGHT: 157 LBS | HEIGHT: 66 IN | HEART RATE: 85 BPM | TEMPERATURE: 98.8 F | RESPIRATION RATE: 16 BRPM | SYSTOLIC BLOOD PRESSURE: 116 MMHG | DIASTOLIC BLOOD PRESSURE: 74 MMHG | BODY MASS INDEX: 25.23 KG/M2

## 2023-07-27 DIAGNOSIS — R30.0 DYSURIA: Primary | ICD-10-CM

## 2023-07-27 LAB
SL AMB  POCT GLUCOSE, UA: NEGATIVE
SL AMB LEUKOCYTE ESTERASE,UA: ABNORMAL
SL AMB POCT BILIRUBIN,UA: NEGATIVE
SL AMB POCT BLOOD,UA: ABNORMAL
SL AMB POCT CLARITY,UA: CLEAR
SL AMB POCT COLOR,UA: YELLOW
SL AMB POCT KETONES,UA: NEGATIVE
SL AMB POCT NITRITE,UA: NEGATIVE
SL AMB POCT PH,UA: 6.5
SL AMB POCT SPECIFIC GRAVITY,UA: 1.02
SL AMB POCT URINE PROTEIN: NEGATIVE
SL AMB POCT UROBILINOGEN: 0.2

## 2023-07-27 PROCEDURE — 81002 URINALYSIS NONAUTO W/O SCOPE: CPT

## 2023-07-27 PROCEDURE — 99213 OFFICE O/P EST LOW 20 MIN: CPT

## 2023-07-27 NOTE — PROGRESS NOTES
[facility]        NAME: Robbie Mcardle is a 34 y.o. female  : 1985    MRN: [id number]  DATE: 2023  TIME: 7:11 PM    Assessment and Plan   Dysuria [R30.0]  1. Dysuria  POCT urine dip    Urine culture            Patient Instructions       Follow up with PCP in 3-5 days. Proceed to  ER if symptoms worsen. Chief Complaint     Chief Complaint   Patient presents with   • Possible UTI     Pt reports dysuria with onset three weeks ago. States was prescribed Diflucan on 23 for positive urine dip. States took medication as prescribed without resolution. History of Present Illness       3 weeks of burning and stinging of urination. She was prescribed Diflucan but this has not helped. Denies back pain denies fever. Review of Systems   Review of Systems   Constitutional: Negative for fever. Genitourinary: Positive for dysuria and frequency. Negative for flank pain.          Current Medications       Current Outpatient Medications:   •  Prenatal Vit-Fe Fumarate-FA (PRENATAL 1 PLUS 1 PO), Take by mouth, Disp: , Rfl:   •  acetaminophen (TYLENOL) 325 mg tablet, Take 2 tablets (650 mg total) by mouth every 6 (six) hours (Patient not taking: Reported on 2023), Disp: , Rfl: 0  •  cetirizine (ZyrTEC) 10 mg tablet, Take 10 mg by mouth daily as needed for allergies (Patient not taking: Reported on 2023), Disp: , Rfl:   •  docusate sodium (COLACE) 100 mg capsule, Take 1 capsule (100 mg total) by mouth 2 (two) times a day (Patient not taking: Reported on 2023), Disp: , Rfl: 0  •  fluocinolone acetonide (DERMOTIC) 0.01 % otic oil, Administer 1 drop into both ears once a week (Patient not taking: Reported on 2023), Disp: , Rfl:   •  ibuprofen (MOTRIN) 600 mg tablet, Take 1 tablet (600 mg total) by mouth every 6 (six) hours (Patient not taking: Reported on 2023), Disp: 30 tablet, Rfl: 0  •  sertraline (ZOLOFT) 25 mg tablet, TAKE 1 TABLET (25 MG TOTAL) BY MOUTH DAILY., Disp: 90 tablet, Rfl: 2    Current Allergies     Allergies as of 2023 - Reviewed 2023   Allergen Reaction Noted   • Cefaclor Hives 2013            The following portions of the patient's history were reviewed and updated as appropriate: allergies, current medications, past family history, past medical history, past social history, past surgical history and problem list.     Past Medical History:   Diagnosis Date   • Allergy     seasonal allergy   • Anxiety    • Chronic endometritis    • Cold intolerance     Resolved 12/15/2015    • Migraine    • Miscarriage     x 5   • Pelvic pressure in female     Resolved 12/15/2015    • PONV (postoperative nausea and vomiting)     Patient requests to be pre-medicated for N/V prior to all surgeries. Also notes some light-headedness and slow to awake s/p anesthesia   • Recurrent pregnancy loss    • Varicella 1989    as a child   • Vomiting during pregnancy     Resolved 12/15/2015    • Wears glasses        Past Surgical History:   Procedure Laterality Date   • CONDYLOMA EXCISION/FULGURATION  May 2007    during her first pregnancy; 2017   • CYST REMOVAL     • HYSTEROSCOPY N/A 2022    Procedure: HYSTEROSCOPY W/ ENDOMETRIAL BIOPSY;  Surgeon: Shirley Aleman DO;  Location: [facility];  Service: Gynecology   • POLYPECTOMY N/A 2022    Procedure: POLYPECTOMY;  Surgeon: Shirley Aleman DO;  Location: [facility];  Service: Gynecology   • POPLITEAL SYNOVIAL CYST EXCISION      Resolved 2007    • NC  DELIVERY ONLY N/A 2023    Procedure:  SECTION ();   Surgeon: Damien Bruce DO;  Location: [facility];  Service: Obstetrics   • NC HYSTEROSCOPY BX ENDOMETRIUM&/POLYPC W/WO D&C N/A 10/21/2019    Procedure: HYSTEROSCOPY; BIOPSY (Bambi Parr); polypectomy;  Surgeon: Shirley Aleman DO;  [facility];  Service: Gynecology   • WISDOM TOOTH EXTRACTION         Family History   Problem Relation Age of Onset   • Migraines Mother    • Miscarriages / Ironside Mother         1 miscarriage   • Allergies Mother    • Sjogren's syndrome Mother    • Stroke Mother 46        TIA   • Colon cancer Father    • Skin cancer Father    • No Known Problems Brother    • Autism Brother    • No Known Problems Daughter    • No Known Problems Daughter    • No Known Problems Daughter    • No Known Problems Son    • No Known Problems Son    • No Known Problems Son    • No Known Problems Maternal Grandmother    • Lung cancer Maternal Grandfather    • Miscarriages / Stillbirths Paternal Grandmother         2 miscarriages   • No Known Problems Paternal Grandfather    • Breast cancer Maternal Aunt          Medications have been verified. Objective   /74 (BP Location: Right arm, Patient Position: Sitting)   Pulse 85   Temp 98.8 °F (37.1 °C)   Resp 16   Ht 5' 6" (1.676 m)   Wt 71.2 kg (157 lb)   LMP 11/10/2022 (Exact Date)   SpO2 98%   BMI 25.34 kg/m²   Patient's last menstrual period was 11/10/2022 (exact date). Physical Exam     Physical Exam  Genitourinary:     Comments: Urinalysis reveals trace leukocytes trace blood.

## 2023-07-27 NOTE — PATIENT INSTRUCTIONS
It does not look like you have an active urinary tract infection. Recheck back in 48 hours for the results of the urine culture.

## 2023-08-07 ENCOUNTER — TELEPHONE (OUTPATIENT)
Dept: OBGYN CLINIC | Facility: CLINIC | Age: 38
End: 2023-08-07

## 2023-08-07 NOTE — TELEPHONE ENCOUNTER
How are you feeling? She is doing well  Are you having any vaginal bleeding? Yes, normal PP bleeding  Have you had any problems voiding? No   Have you had any problems moving your bowels? No   Type of Delivery? c/s  Vaginal delivery - any issues with healing? No    - is your incision healing well? yes  Is there any redness or drainage? No         Baby's name: Retia Matters   How is the baby doing? out of NICU last Thursday  Are you breast/bottle feeding? bottle feeding but working with a lactation consult  How is that going? Going okay   Have you seen lactation consultant? yes  Are you having any baby blues? no  Do you have any help at home? Yes her  has a few weeks off. Follow up appointment scheduled: yes    Patient advised to call the office for abnormal bleeding, mastitis, infection, any signs of postpartum depression. Patient verbalized understanding. Routing to provider to update.

## 2023-08-25 ENCOUNTER — POSTPARTUM VISIT (OUTPATIENT)
Dept: OBGYN CLINIC | Facility: CLINIC | Age: 38
End: 2023-08-25

## 2023-08-25 VITALS
BODY MASS INDEX: 25.55 KG/M2 | SYSTOLIC BLOOD PRESSURE: 120 MMHG | DIASTOLIC BLOOD PRESSURE: 76 MMHG | HEIGHT: 66 IN | WEIGHT: 159 LBS

## 2023-08-25 DIAGNOSIS — N93.9 ABNORMAL UTERINE BLEEDING (AUB): ICD-10-CM

## 2023-08-25 PROBLEM — O09.523 MULTIGRAVIDA OF ADVANCED MATERNAL AGE IN THIRD TRIMESTER: Status: RESOLVED | Noted: 2023-02-09 | Resolved: 2023-08-25

## 2023-08-25 PROBLEM — Z98.891 STATUS POST PRIMARY LOW TRANSVERSE CESAREAN SECTION: Status: RESOLVED | Noted: 2023-07-11 | Resolved: 2023-08-25

## 2023-08-25 PROBLEM — D69.6 GESTATIONAL THROMBOCYTOPENIA (HCC): Status: RESOLVED | Noted: 2020-09-19 | Resolved: 2023-08-25

## 2023-08-25 PROBLEM — O99.119 GESTATIONAL THROMBOCYTOPENIA (HCC): Status: RESOLVED | Noted: 2020-09-19 | Resolved: 2023-08-25

## 2023-08-25 PROBLEM — O99.019 IRON DEFICIENCY ANEMIA DURING PREGNANCY: Status: RESOLVED | Noted: 2023-03-30 | Resolved: 2023-08-25

## 2023-08-25 PROBLEM — D50.9 IRON DEFICIENCY ANEMIA DURING PREGNANCY: Status: RESOLVED | Noted: 2023-03-30 | Resolved: 2023-08-25

## 2023-08-25 PROBLEM — Z64.1 GRAND MULTIPARITY: Status: RESOLVED | Noted: 2023-02-09 | Resolved: 2023-08-25

## 2023-08-25 PROCEDURE — 99024 POSTOP FOLLOW-UP VISIT: CPT | Performed by: OBSTETRICS & GYNECOLOGY

## 2023-08-25 NOTE — PROGRESS NOTES
Assessment/Plan     Normal postpartum exam.   Patient would like to discuss the recent vaginal bleeding/spotting. She declines BC at this time. BP and weight at the end of visit. 1. Contraception: none  2. Annual exam due in January. 3. Lactation consult, [address] [place] [address] information discussed. 4. Increase activity as tolerated, may resume all normal activity. 5. Anticipated return to work: 6 - 12 weeks post partum. Subjective   Chief Complaint   Patient presents with   • Postpartum Care       Mannie Oscar is a 34 y.o. [id number] female who presents for a postpartum visit. Delivery Method: , Low Transverse    Delivery Date and Time: 2023  7:24 PM   Delivery Attending:    Gestational Age at delivery: 34w5d   Route of Delivery: , Low Transverse   Reason for  delivery: Category II FHR Tracing          Admitting Diagnoses:   Patient Active Problem List   Diagnosis   • Recurrent pregnancy loss   • S/P myomectomy   • Gestational thrombocytopenia (HCC)   • Anxiety   • PONV (postoperative nausea and vomiting)   • Migraine   • Multigravida of advanced maternal age in third trimester   • [address] multiparity   • Iron deficiency anemia during pregnancy   • Status post primary low transverse  section at 34 weeks        Gestational Diabetes: no  Pregnancy Complications: Non reassuring FHT at 35 wks. Anesthesia: Spinal ,     Delivery: , Low Transverse  at 2023  7:24 PM     Baby's Weight: 2013 g (4 lb 7 oz) ; 71     Apgar scores: 8  and 9  at 1 and 5 minutes, respectively  Baby's Name: Sulaiman  Breast or formula: Breastfeeding  Pediatrician: [facility]         Bleeding : stopped bleeding x 4 days, then started up again with dark brown that progressed to bright red and was heavier than her initial post partum bleeding. It increases and decreases since it started. Discussed retained products, h/o endometritis. Will obtain pelvic US now and monitor closely.       Bowel function: normal. Bladder function: normal. She did a dose of diflucan once. UA was clear at Urgent care. Patient has not been sexually active. Desired contraception method is none. Postpartum depression screening: negative. EPDS : 6    Last Pap : 2023 ; no abnormalities      The following portions of the patient's history were reviewed and updated as appropriate: allergies, current medications, past family history, past medical history, past social history, past surgical history and problem list.      Current Outpatient Medications:   •  acetaminophen (TYLENOL) 325 mg tablet, Take 2 tablets (650 mg total) by mouth every 6 (six) hours (Patient not taking: Reported on 7/27/2023), Disp: , Rfl: 0  •  cetirizine (ZyrTEC) 10 mg tablet, Take 10 mg by mouth daily as needed for allergies (Patient not taking: Reported on 7/27/2023), Disp: , Rfl:   •  docusate sodium (COLACE) 100 mg capsule, Take 1 capsule (100 mg total) by mouth 2 (two) times a day (Patient not taking: Reported on 7/27/2023), Disp: , Rfl: 0  •  fluocinolone acetonide (DERMOTIC) 0.01 % otic oil, Administer 1 drop into both ears once a week (Patient not taking: Reported on 7/27/2023), Disp: , Rfl:   •  ibuprofen (MOTRIN) 600 mg tablet, Take 1 tablet (600 mg total) by mouth every 6 (six) hours (Patient not taking: Reported on 7/27/2023), Disp: 30 tablet, Rfl: 0  •  Prenatal Vit-Fe Fumarate-FA (PRENATAL 1 PLUS 1 PO), Take by mouth, Disp: , Rfl:   •  sertraline (ZOLOFT) 25 mg tablet, TAKE 1 TABLET (25 MG TOTAL) BY MOUTH DAILY. , Disp: 90 tablet, Rfl: 2    Allergies   Allergen Reactions   • Cefaclor Hives       Review of Systems  Review of Systems   Constitutional: Negative for chills and fever. Genitourinary: Positive for vaginal bleeding. Negative for pelvic pain.          Objective      /76 (BP Location: Left arm, Patient Position: Sitting, Cuff Size: Standard)   Ht 5' 6" (1.676 m)   Wt 72.1 kg (159 lb)   LMP 11/10/2022 (Exact Date) Breastfeeding Yes Comment: pumping  BMI 25.66 kg/m²     Physical Exam  Constitutional:       General: She is not in acute distress. Appearance: Normal appearance. She is well-developed. She is not ill-appearing. Pulmonary:      Effort: Pulmonary effort is normal. No respiratory distress. Neurological:      General: No focal deficit present. Mental Status: She is alert and oriented to person, place, and time. Psychiatric:         Mood and Affect: Mood normal.         Behavior: Behavior normal.         Thought Content: Thought content normal.         Judgment: Judgment normal.   Vitals and nursing note reviewed.          Incision: clean, dry, and intact

## 2023-09-01 ENCOUNTER — HOSPITAL ENCOUNTER (OUTPATIENT)
Dept: ULTRASOUND IMAGING | Facility: HOSPITAL | Age: 38
Discharge: HOME | End: 2023-09-01
Attending: OBSTETRICS & GYNECOLOGY
Payer: COMMERCIAL

## 2023-09-01 DIAGNOSIS — N93.9 ABNORMAL UTERINE BLEEDING (AUB): ICD-10-CM

## 2023-09-01 PROCEDURE — 76856 US EXAM PELVIC COMPLETE: CPT

## 2023-09-01 PROCEDURE — 76830 TRANSVAGINAL US NON-OB: CPT

## 2023-09-22 ENCOUNTER — PROCEDURE VISIT (OUTPATIENT)
Dept: OBGYN CLINIC | Facility: CLINIC | Age: 38
End: 2023-09-22
Payer: COMMERCIAL

## 2023-09-22 VITALS
SYSTOLIC BLOOD PRESSURE: 110 MMHG | DIASTOLIC BLOOD PRESSURE: 70 MMHG | HEIGHT: 66 IN | WEIGHT: 160.8 LBS | BODY MASS INDEX: 25.84 KG/M2

## 2023-09-22 DIAGNOSIS — N85.9 FLUID IN ENDOMETRIAL CAVITY: ICD-10-CM

## 2023-09-22 DIAGNOSIS — N93.9 ABNORMAL UTERINE BLEEDING (AUB): Primary | ICD-10-CM

## 2023-09-22 LAB — SL AMB POCT URINE HCG: NEGATIVE

## 2023-09-22 PROCEDURE — 81025 URINE PREGNANCY TEST: CPT | Performed by: OBSTETRICS & GYNECOLOGY

## 2023-09-22 PROCEDURE — 88305 TISSUE EXAM BY PATHOLOGIST: CPT | Performed by: PATHOLOGY

## 2023-09-22 PROCEDURE — 58100 BIOPSY OF UTERUS LINING: CPT | Performed by: OBSTETRICS & GYNECOLOGY

## 2023-09-22 NOTE — PROGRESS NOTES
Endometrial biopsy    Date/Time: 9/22/2023 1:00 PM    Performed by: Katie Johansen DO  Authorized by: Katie Johansen DO  Universal Protocol:  Consent: Verbal consent obtained. Written consent obtained. Risks and benefits: risks, benefits and alternatives were discussed  Consent given by: patient  Patient understanding: patient states understanding of the procedure being performed  Patient consent: the patient's understanding of the procedure matches consent given  Required items: required blood products, implants, devices, and special equipment available  Patient identity confirmed: verbally with patient      Indication:     Indications:  Other disorder of menstruation and other abnormal bleeding from female genital tract      Indications comment:  Fluid in endometrium  Procedure:     Procedure: endometrial biopsy with Pipelle      A bivalve speculum was placed in the vagina: yes      Cervix cleaned and prepped: yes      Uterus sounded: yes      Uterus sound depth (cm):  7    Curettes used:  1    Specimen collected: specimen collected and sent to pathology      Patient tolerated procedure well with no complications: yes    Findings:     Uterus size:  9-10 weeks    Cervix: normal      Adnexa: normal

## 2023-09-27 PROCEDURE — 88305 TISSUE EXAM BY PATHOLOGIST: CPT | Performed by: PATHOLOGY

## 2023-10-13 ENCOUNTER — TELEPHONE (OUTPATIENT)
Dept: OBGYN CLINIC | Facility: CLINIC | Age: 38
End: 2023-10-13

## 2023-10-13 DIAGNOSIS — N61.0 MASTITIS: Primary | ICD-10-CM

## 2023-10-13 NOTE — TELEPHONE ENCOUNTER
Patient called, she delivered in July, she is exclusively pumping. She has breastfeed 7 babies and is concerned she has Mastitis. She has had a clogged duct for 4 days. Today her left breast is tender and has a red spot in that area. She denies any fevers or chills.  Do you want to send in any medication for her

## 2023-10-13 NOTE — TELEPHONE ENCOUNTER
Patient notified medication was sent to the pharmacy.  She also can use cold compressed as need and Ibuprofen 600 mg every 6 hours

## 2023-11-21 ENCOUNTER — TELEPHONE (OUTPATIENT)
Dept: FAMILY MEDICINE CLINIC | Facility: CLINIC | Age: 38
End: 2023-11-21

## 2024-01-19 ENCOUNTER — ANNUAL EXAM (OUTPATIENT)
Dept: OBGYN CLINIC | Facility: CLINIC | Age: 39
End: 2024-01-19
Payer: COMMERCIAL

## 2024-01-19 VITALS
HEIGHT: 66 IN | WEIGHT: 156 LBS | SYSTOLIC BLOOD PRESSURE: 102 MMHG | BODY MASS INDEX: 25.07 KG/M2 | DIASTOLIC BLOOD PRESSURE: 60 MMHG

## 2024-01-19 DIAGNOSIS — N71.9 ENDOMETRITIS: ICD-10-CM

## 2024-01-19 DIAGNOSIS — Z01.419 WOMEN'S ANNUAL ROUTINE GYNECOLOGICAL EXAMINATION: Primary | ICD-10-CM

## 2024-01-19 DIAGNOSIS — Z12.31 ENCOUNTER FOR SCREENING MAMMOGRAM FOR MALIGNANT NEOPLASM OF BREAST: ICD-10-CM

## 2024-01-19 PROCEDURE — S0612 ANNUAL GYNECOLOGICAL EXAMINA: HCPCS | Performed by: OBSTETRICS & GYNECOLOGY

## 2024-01-19 RX ORDER — DOXYCYCLINE 100 MG/1
100 TABLET ORAL 2 TIMES DAILY
Qty: 20 TABLET | Refills: 0 | Status: SHIPPED | OUTPATIENT
Start: 2024-01-19 | End: 2024-01-29

## 2024-01-19 RX ORDER — NEOMYCIN SULFATE, POLYMYXIN B SULFATE AND DEXAMETHASONE 3.5; 10000; 1 MG/ML; [USP'U]/ML; MG/ML
SUSPENSION/ DROPS OPHTHALMIC
COMMUNITY
Start: 2023-12-31

## 2024-01-19 NOTE — PROGRESS NOTES
ASSESSMENT & PLAN:   Diagnoses and all orders for this visit:    Women's annual routine gynecological examination    Encounter for screening mammogram for malignant neoplasm of breast  -     Mammo screening bilateral w 3d & cad; Future - to be done 6 months after she stops breastfeeding. Breast cancer in maternal aunt, stage 0, later in life.     Endometritis  -     doxycycline (ADOXA) 100 MG tablet; Take 1 tablet (100 mg total) by mouth 2 (two) times a day for 10 days    Other orders  -     neomycin-polymyxin-dexamethasone (MAXITROL) ophthalmic suspension; INSTILL 1 DROP 4 TIMES A DAY INTO BOTH EYES X 5 DAYS          The following were reviewed in today's visit: ASCCP guidelines, Gardisil vaccination, STD testing breast self exam, mammography screening ordered, family planning choices, and exercise.    Patient to return to office in yearly for annual exam.     All questions have been answered to her satisfaction.        CC:  Annual Gynecologic Examination  Chief Complaint   Patient presents with    Gynecologic Exam     Annual exam, pap not indicated. Pt is well, would like to discuss menstrual periods.   neg pap/neg HPV 23           HPI: Silvina Barr is a 38 y.o.  who presents for annual gynecologic examination.  She has the following concerns:  infection, had doxy after her last 2 pregnancies, after D&C. Patient has same symptoms. Periods have returned, some cycles are lighter, others normal flow. Will treat with Doxy x 10 days.     Baby needs ct at [facility] - spot seen behind his heart.       Health Maintenance:    Exercise: intermittently  Breast exams/breast awareness: yes  Last mammogram:     Past Medical History:   Diagnosis Date    Allergy     seasonal allergy    Anxiety     Chronic endometritis     Cold intolerance     Resolved 12/15/2015     Gestational thrombocytopenia (HCC) 2020    Migraine     Miscarriage     x 5    Pelvic pressure in female     Resolved 12/15/2015     PONV  (postoperative nausea and vomiting)     Patient requests to be pre-medicated for N/V prior to all surgeries. Also notes some light-headedness and slow to awake s/p anesthesia    Recurrent pregnancy loss     Status post primary low transverse  section at 34 weeks  2023    Varicella     as a child    Vomiting during pregnancy     Resolved 12/15/2015     Wears glasses        Past Surgical History:   Procedure Laterality Date    CONDYLOMA EXCISION/FULGURATION  May 2007    during her first pregnancy; 2017    CYST REMOVAL      HYSTEROSCOPY N/A 2022    Procedure: HYSTEROSCOPY W/ ENDOMETRIAL BIOPSY;  Surgeon: Tara Budinetz, DO;  Location: [facility];  Service: Gynecology    POLYPECTOMY N/A 2022    Procedure: POLYPECTOMY;  Surgeon: Tara Budinetz, DO;  Location: [facility];  Service: Gynecology    POPLITEAL SYNOVIAL CYST EXCISION      Resolved 2007     ID  DELIVERY ONLY N/A 2023    Procedure:  SECTION ();  Surgeon: Bryanna Valentino DO;  [facility]: [facility];  Service: Obstetrics    ID HYSTEROSCOPY BX ENDOMETRIUM&/POLYPC W/WO D&C N/A 10/21/2019    Procedure: HYSTEROSCOPY; BIOPSY (MYOSURE); polypectomy;  Surgeon: Tara Budinetz, DO;  [facility];  Service: Gynecology    WISDOM TOOTH EXTRACTION         Past OB/Gyn History:  Period Duration (Days): 6-7  Period Pattern: Regular  Menstrual Flow: Moderate, Light  Menstrual Control: Tampon, Maxi pad, Thin pad, Panty linerNo LMP recorded.    Last Pap:  : no abnormalities  History of abnormal Pap smear: No  HPV vaccine completed: no    Patient is currently sexually active.   STD testing: no  Current contraception: none      Family History  Family History   Problem Relation Age of Onset    Migraines Mother     Miscarriages / Stillbirths Mother         1 miscarriage    Allergies Mother     Sjogren's syndrome Mother     Stroke Mother 52        TIA    Colon cancer Father     Skin cancer Father     No Known Problems  Brother     Autism Brother     No Known Problems Daughter     No Known Problems Daughter     No Known Problems Daughter     No Known Problems Son     No Known Problems Son     No Known Problems Son     No Known Problems Maternal Grandmother     Lung cancer Maternal Grandfather     Miscarriages / Stillbirths Paternal Grandmother         2 miscarriages    No Known Problems Paternal Grandfather     Breast cancer Maternal Aunt        Family history of uterine or ovarian cancer: no  Family history of breast cancer: yes  Family history of colon cancer: no    Social History:  Social History     Socioeconomic History    Marital status: /Civil Union     Spouse name: Not on file    Number of children: Not on file    Years of education: Not on file    Highest education level: Not on file   Occupational History    Not on file   Tobacco Use    Smoking status: Never    Smokeless tobacco: Never   Vaping Use    Vaping status: Never Used   Substance and Sexual Activity    Alcohol use: Not Currently    Drug use: Never    Sexual activity: Yes     Partners: Male     Birth control/protection: Abstinence, None     Comment: x 13 years George   Other Topics Concern    Not on file   Social History Narrative    Always uses seat belt    Feels safe at home         Consumes 2 cups of coffee per day     Social Determinants of Health     Financial Resource Strain: Not on file   Food Insecurity: Not on file   Transportation Needs: Not on file   Physical Activity: Not on file   Stress: Not on file   Social Connections: Not on file   Intimate Partner Violence: Not on file   Housing Stability: Not on file     Domestic violence screen: negative    Allergies:  Allergies   Allergen Reactions    Cefaclor Hives       Medications:    Current Outpatient Medications:     doxycycline (ADOXA) 100 MG tablet, Take 1 tablet (100 mg total) by mouth 2 (two) times a day for 10 days, Disp: 20 tablet, Rfl: 0    neomycin-polymyxin-dexamethasone (MAXITROL)  "ophthalmic suspension, INSTILL 1 DROP 4 TIMES A DAY INTO BOTH EYES X 5 DAYS, Disp: , Rfl:     Prenatal Vit-Fe Fumarate-FA (PRENATAL 1 PLUS 1 PO), Take by mouth, Disp: , Rfl:     sertraline (ZOLOFT) 25 mg tablet, TAKE 1 TABLET (25 MG TOTAL) BY MOUTH DAILY., Disp: 90 tablet, Rfl: 2    cetirizine (ZyrTEC) 10 mg tablet, Take 10 mg by mouth daily as needed for allergies (Patient not taking: Reported on 1/19/2024), Disp: , Rfl:     Review of Systems:  Review of Systems   Constitutional:  Negative for chills and fever.   Respiratory:  Negative for shortness of breath.    Cardiovascular:  Negative for chest pain.   Gastrointestinal:  Positive for nausea (w headaches). Negative for abdominal distention, abdominal pain, blood in stool, constipation and vomiting.   Genitourinary:  Positive for menstrual problem (irregular flow). Negative for difficulty urinating, dyspareunia, dysuria, frequency, pelvic pain, urgency, vaginal bleeding, vaginal discharge and vaginal pain.   Neurological:  Positive for headaches.         Physical Exam:  /60 (BP Location: Left arm, Patient Position: Sitting, Cuff Size: Standard)   Ht 5' 6\" (1.676 m)   Wt 70.8 kg (156 lb)   Breastfeeding Yes Comment: Pumping  BMI 25.18 kg/m²    Physical Exam  Constitutional:       General: She is awake.      Appearance: Normal appearance. She is well-developed.   Genitourinary:      Vulva, bladder and urethral meatus normal.      Right Labia: No rash, tenderness or lesions.     Left Labia: No tenderness, lesions or rash.     No labial fusion noted.      Vaginal discharge (yellow d/c present) present.      No vaginal erythema, tenderness or bleeding.      No vaginal prolapse present.     No vaginal atrophy present.       Right Adnexa: not tender, not full and no mass present.     Left Adnexa: not tender, not full and no mass present.     Cervix is parous.      No cervical motion tenderness, lesion or polyp.      Uterus is not enlarged, tender or irregular. "      No uterine mass detected.     Uterus is anteverted.      No urethral prolapse present.      Bladder is not tender.       Pelvic exam was performed with patient in the lithotomy position.   Breasts:     Right: No inverted nipple, mass, nipple discharge, skin change or tenderness.      Left: No inverted nipple, mass, nipple discharge, skin change or tenderness.   HENT:      Head: Normocephalic and atraumatic.   Cardiovascular:      Rate and Rhythm: Normal rate and regular rhythm.      Heart sounds: Normal heart sounds.   Pulmonary:      Effort: Pulmonary effort is normal. No tachypnea or respiratory distress.      Breath sounds: Normal breath sounds.   Abdominal:      General: Abdomen is flat. There is no distension.      Palpations: Abdomen is soft.      Tenderness: There is no abdominal tenderness. There is no guarding or rebound.   Musculoskeletal:      Cervical back: Neck supple.   Lymphadenopathy:      Upper Body:      Right upper body: No supraclavicular or axillary adenopathy.      Left upper body: No supraclavicular or axillary adenopathy.   Neurological:      General: No focal deficit present.      Mental Status: She is alert and oriented to person, place, and time.   Psychiatric:         Mood and Affect: Mood normal.         Behavior: Behavior normal.         Thought Content: Thought content normal.         Judgment: Judgment normal.   Vitals reviewed.

## 2024-01-22 DIAGNOSIS — R11.0 NAUSEA: Primary | ICD-10-CM

## 2024-01-22 RX ORDER — ONDANSETRON 4 MG/1
4 TABLET, FILM COATED ORAL EVERY 8 HOURS PRN
Qty: 20 TABLET | Refills: 0 | Status: SHIPPED | OUTPATIENT
Start: 2024-01-22

## 2025-01-01 ENCOUNTER — DOCUMENTATION (OUTPATIENT)
Dept: LABOR AND DELIVERY | Facility: HOSPITAL | Age: 40
End: 2025-01-01

## 2025-01-01 DIAGNOSIS — N96 RECURRENT PREGNANCY LOSS: Primary | ICD-10-CM

## 2025-01-01 RX ORDER — PROGESTERONE 200 MG/1
200 CAPSULE ORAL
Qty: 90 CAPSULE | Refills: 1 | Status: SHIPPED | OUTPATIENT
Start: 2025-01-01

## 2025-01-02 ENCOUNTER — OFFICE VISIT (OUTPATIENT)
Dept: NEUROLOGY | Facility: CLINIC | Age: 40
End: 2025-01-02
Payer: COMMERCIAL

## 2025-01-02 VITALS
WEIGHT: 153 LBS | HEART RATE: 85 BPM | TEMPERATURE: 98.4 F | SYSTOLIC BLOOD PRESSURE: 118 MMHG | DIASTOLIC BLOOD PRESSURE: 72 MMHG | BODY MASS INDEX: 24.59 KG/M2 | HEIGHT: 66 IN | OXYGEN SATURATION: 96 %

## 2025-01-02 DIAGNOSIS — G43.909 MIGRAINE: Primary | ICD-10-CM

## 2025-01-02 PROCEDURE — 99213 OFFICE O/P EST LOW 20 MIN: CPT | Performed by: PSYCHIATRY & NEUROLOGY

## 2025-01-02 RX ORDER — METOCLOPRAMIDE 10 MG/1
10 TABLET ORAL 3 TIMES DAILY PRN
Qty: 90 TABLET | Refills: 3 | Status: SHIPPED | OUTPATIENT
Start: 2025-01-02

## 2025-01-02 NOTE — ASSESSMENT & PLAN NOTE
Patient is a very pleasant 39-year-old female with history of recurrent pregnancy loss and endometritis who presents for headache follow up. Patient with a frontal pressure like pain associated with nausea. She has been getting 12-15 headache days in a month. Half of the headaches are less intense and respond well to motrin and Excedrin. Headaches associated with nausea and dizziness less so. Patient recently found out she was pregnant and should be approximately 4 weeks.    Plan:  Patient with migraine headaches   We have limited options given positive pregnancy test   Continue prenatal vitamin   Recommend taking magnesium Oxide 400 mg and riboflavin 400 mg (or 200 mg bid)  Advised to stop all NSAIDs including motrin and Excedrin migraine   Can take tylenol 1000 mg with reglan 10 mg at headache onset   Follow up closely with obgyn and MFM.  Will see her again in 4 months. She is to contact my office sooner if headache were to worsen     Orders:  •  metoclopramide (Reglan) 10 mg tablet; Take 1 tablet (10 mg total) by mouth 3 (three) times a day as needed (nausea)

## 2025-01-02 NOTE — PATIENT INSTRUCTIONS
Take magnesium oxide 400 mg and Vitamin B 2 (riboflavin)  Stop motrin  and Excedrin migraine   At headache onset Take 1000 mg of tylenol. You can take this every 6 hours as needed

## 2025-01-02 NOTE — PROGRESS NOTES
"Name: Silvina Barr      : 1985      MRN: [id number]  Encounter Provider: Rosario Fermin MD  Encounter Date: 2025   Encounter department: [facility]  :  Assessment & Plan      {Ambulatory Patient Instructions (Optional):37251}    History of Present Illness {?Quick Links Encounters * My Last Note * Last Note in Specialty * Snapshot * Since Last Visit * History :53672}  HPI  Review of Systems I have personally reviewed the MA's review of systems and made changes as necessary.    {Select to insert medical history sections (Optional):24135}     Objective {?Quick Links Trend Vitals * Enter New Vitals * Results Review * Timeline (Adult) * Labs * Imaging * Cardiology * Procedures * Lung Cancer Screening * Surgical eConsent :66142}  /72 (BP Location: Left arm, Patient Position: Sitting, Cuff Size: Standard)   Pulse 85   Temp 98.4 °F (36.9 °C) (Temporal)   Ht 5' 6\" (1.676 m)   Wt 69.4 kg (153 lb)   SpO2 96%   BMI 24.69 kg/m²     Physical Exam  Neurological Exam    {Radiology Results Review (Optional):21808}    {Administrative / Billing Section (Optional):43562}  "

## 2025-01-02 NOTE — PROGRESS NOTES
Name: Silvina Barr      : 1985      MRN: [id number]  Encounter Provider: Rosario Fermin MD  Encounter Date: 2025   Encounter department: [facility]  :  Assessment & Plan  Migraine  Patient is a very pleasant 39-year-old female with history of recurrent pregnancy loss and endometritis who presents for headache follow up. Patient with a frontal pressure like pain associated with nausea. She has been getting 12-15 headache days in a month. Half of the headaches are less intense and respond well to motrin and Excedrin. Headaches associated with nausea and dizziness less so. Patient recently found out she was pregnant and should be approximately 4 weeks.    Plan:  Patient with migraine headaches   We have limited options given positive pregnancy test   Continue prenatal vitamin   Recommend taking magnesium Oxide 400 mg and riboflavin 400 mg (or 200 mg bid)  Advised to stop all NSAIDs including motrin and Excedrin migraine   Can take tylenol 1000 mg with reglan 10 mg at headache onset   Follow up closely with obgyn and MFM.  Will see her again in 4 months. She is to contact my office sooner if headache were to worsen     Orders:  •  metoclopramide (Reglan) 10 mg tablet; Take 1 tablet (10 mg total) by mouth 3 (three) times a day as needed (nausea)          History of Present Illness   HPI    Patient is a very pleasant 39-year-old female with history of recurrent pregnancy loss and endometritis who presents for headache follow up.  I last saw her 2023 while in residency.     Initial visit (2022):  Reports frontal headache associated with photophobia and phonophobia.  Headaches improve with ibuprofen.  She has noted a more intense headache when endometritis goes untreated.  Blood work as well as MRI inconclusive for etiology.  ENT workup negative.  Physical exam normal.     At this time, possible that patient may be having vestibular migraines.  At this time  we are limited with treatment given that patient is actively trying to conceive.  We will do an MRA brain and VS of the carotids to look for fibromuscular dysplasia.  Can continue Excedrin migraine as well as Tylenol and Compazine for headaches.  Advised to continue magnesium and riboflavin.  Once done with childbearing can consider other options.    Prior encounters:  2023: Patient with a frontal headache associated with photophobia and phonophobia that was worse when endometritis goes untreated and better during pregnancy.  Blood work as well as MRI inconclusive for etiology.  ENT workup negative. GRETCHEN positive. MRI and VAS ordered to look for fibromuscular dysplasia and rule out vascular pathology which was negative. A more conservative approach in terms of treatment done given patient was actively trying to conceive, which she achieved shortly after. Patient reports migraines have been better during pregnancy period. She had one isolated episode of cervicalgia type pain which lasted a few days, did not improve with tylenol but improved with massage therapy.     Prior workup:    MRA AND OR MRV HEAD WO CONTRAST (Final) 2022  7:04 PM    Impression  No intracranial aneurysm or major intracranial arterial stenosis.  No luminal irregularity to confirm underlying vasculopathy by MRA technique.      MRI BRAIN IAC WO CONTRAST (Final) 10/26/2019  8:29 AM    Impression  No acute intracranial abnormality.    No IAC pathology identified on this noncontrast study.      Prior treatment:    Abortive:   Motrin   Excedrin     Preventative:  None       Interval history:  Having a headband frontal like pressure   Associated with nausea   12-15 headache days, 50/50 split.   Last pregnancy the headaches did not bother her at all   Should be 4 weeks pregnant  HA with nausea and dizziness, respond less to well to motrin and excedrin migraine   Has been on a  vitamin   Postpartum headaches returned about a year ago.              Medication  MOA as above Safety    Magnesium   Safe throughout     Acetaminophen?   Safe throughout    Flexeril?   Safe throughout    Cyproheptadine?   Safe throughout. Contraindicated during breastfeeding     Reglan?   Safe throughout    Ibuprofen?   Sparingly from 2nd trimester on. CI 1st trimester    Triptans   Sparingly from 2nd trimester on. CI first trimester     Fioricet    Frequently used by OBGYN team although can cause medication overuse headache          Review of Systems   Constitutional:  Negative for appetite change, fatigue and fever.   HENT: Negative.  Negative for hearing loss, tinnitus, trouble swallowing and voice change.    Eyes: Negative.  Negative for photophobia, pain and visual disturbance.   Respiratory: Negative.  Negative for shortness of breath.    Cardiovascular: Negative.  Negative for palpitations.   Gastrointestinal:  Positive for nausea. Negative for vomiting.   Endocrine: Negative.  Negative for cold intolerance.   Genitourinary: Negative.  Negative for dysuria, frequency and urgency.   Musculoskeletal:  Negative for back pain, gait problem, myalgias, neck pain and neck stiffness.   Skin: Negative.  Negative for rash.   Allergic/Immunologic: Negative.    Neurological:  Positive for headaches. Negative for dizziness, tremors, seizures, syncope, facial asymmetry, speech difficulty, weakness, light-headedness and numbness.        Pt states she's pregnant. Experiencing a pressure like frontal HA about 12-15 times monthly within the last year. Sometimes it's a nausea instead of pressure. Takes motrin/exedrin to knock out the pain.   Hematological: Negative.  Does not bruise/bleed easily.   Psychiatric/Behavioral: Negative.  Negative for confusion, hallucinations and sleep disturbance.    All other systems reviewed and are negative.   I have personally reviewed the MA's review of systems and made changes as necessary.         Objective   /72 (BP Location: Left arm,  "Patient Position: Sitting, Cuff Size: Standard)   Pulse 85   Temp 98.4 °F (36.9 °C) (Temporal)   Ht 5' 6\" (1.676 m)   Wt 69.4 kg (153 lb)   SpO2 96%   BMI 24.69 kg/m²     Physical Exam  Neurological Exam          "

## 2025-01-07 ENCOUNTER — APPOINTMENT (OUTPATIENT)
Dept: LAB | Facility: CLINIC | Age: 40
End: 2025-01-07
Payer: COMMERCIAL

## 2025-01-07 DIAGNOSIS — N96 MULTIPLE PREGNANCY LOSS, NOT CURRENTLY PREGNANT: Primary | ICD-10-CM

## 2025-01-07 DIAGNOSIS — N96 HISTORY OF RECURRENT MISCARRIAGES: ICD-10-CM

## 2025-01-07 LAB
B-HCG SERPL-ACNC: 539.2 MIU/ML (ref 0–5)
PROGEST SERPL-MCNC: 37.3 NG/ML

## 2025-01-07 PROCEDURE — 84144 ASSAY OF PROGESTERONE: CPT

## 2025-01-07 PROCEDURE — 84702 CHORIONIC GONADOTROPIN TEST: CPT

## 2025-01-07 PROCEDURE — 36415 COLL VENOUS BLD VENIPUNCTURE: CPT

## 2025-01-08 ENCOUNTER — RESULTS FOLLOW-UP (OUTPATIENT)
Dept: LABOR AND DELIVERY | Facility: HOSPITAL | Age: 40
End: 2025-01-08

## 2025-01-20 DIAGNOSIS — N96 RECURRENT PREGNANCY LOSS: ICD-10-CM

## 2025-01-20 RX ORDER — PROGESTERONE 200 MG/1
200 CAPSULE ORAL 2 TIMES DAILY
Qty: 180 CAPSULE | Refills: 2 | Status: SHIPPED | OUTPATIENT
Start: 2025-01-20

## 2025-01-31 ENCOUNTER — ULTRASOUND (OUTPATIENT)
Dept: OBGYN CLINIC | Facility: CLINIC | Age: 40
End: 2025-01-31
Payer: COMMERCIAL

## 2025-01-31 VITALS
HEIGHT: 66 IN | DIASTOLIC BLOOD PRESSURE: 70 MMHG | WEIGHT: 160 LBS | SYSTOLIC BLOOD PRESSURE: 112 MMHG | BODY MASS INDEX: 25.71 KG/M2

## 2025-01-31 DIAGNOSIS — Z32.01 PREGNANCY, CONFIRMED, NOT FIRST: ICD-10-CM

## 2025-01-31 DIAGNOSIS — O21.9 NAUSEA AND VOMITING IN PREGNANCY: Primary | ICD-10-CM

## 2025-01-31 PROCEDURE — 99213 OFFICE O/P EST LOW 20 MIN: CPT | Performed by: OBSTETRICS & GYNECOLOGY

## 2025-01-31 PROCEDURE — 76817 TRANSVAGINAL US OBSTETRIC: CPT | Performed by: OBSTETRICS & GYNECOLOGY

## 2025-01-31 RX ORDER — ONDANSETRON 4 MG/1
4 TABLET, FILM COATED ORAL EVERY 8 HOURS PRN
Qty: 20 TABLET | Refills: 0 | Status: SHIPPED | OUTPATIENT
Start: 2025-01-31

## 2025-01-31 NOTE — PROGRESS NOTES
Assessment/Plan:  Diagnoses and all orders for this visit:    Nausea and vomiting in pregnancy  -     ondansetron (ZOFRAN) 4 mg tablet; Take 1 tablet (4 mg total) by mouth every 8 (eight) hours as needed for nausea or vomiting    Pregnancy, confirmed, not first  -     Ambulatory Referral to Maternal Fetal Medicine; Future  -     [facility] < 14 weeks single or first gestation level 1        - Viable IUP @ 6w 6d EGA  - LEVAR 2025 by ultrasound  - will repeat scan in 1-2 wks - subchorionic hemorrhage  - Continue PNV  - Patient to call for concerns  - RTO ~ 10-12 weeks for OB intake  - call MFM for 13 week NT scan/genetic testing.               I have spent a total time of 20 minutes in caring for this patient on the day of the visit/encounter including Diagnostic results, Prognosis, Risks and benefits of tx options, Instructions for management, Patient and family education, Impressions, Counseling / Coordination of care, Documenting in the medical record, and Reviewing / ordering tests, medicine, procedures  .        Subjective:       Patient ID: Silvina Barr 1985        Silvina Barr is a 39 y.o.  presenting to the office for pregnancy confirmation. Patient's last menstrual period was 2024. , placing her at 8w0d today with LEVAR of 2025. She is feeling overall ok, but nauseated.  Some heart burn     Nausea:yes  Vomiting: no  Bleeding: no  Cramping: no  Headaches: yes  Fatigue: no  Constipation: no  Blood type, if known: A-       OB History    Para Term  AB Living   15 7 6 1 7 7   SAB IAB Ectopic Multiple Live Births   7   0 7      # Outcome Date GA Lbr Maldonado/2nd Weight Sex Type Anes PTL Lv   15 Current            14  23 34w5d  2013 g (4 lb 7 oz) M CS-LTranv Spinal  DAVID      Complications: Fetal Intolerance   13 SAB 2022 4w0d          12 SAB 2021 8w0d          11 Term 10/23/20 40w3d / 00:06 3750 g (8 lb 4.3 oz) M Vag-Spont EPI  DAVID   10 2019 10w0d    SAB       9 SAB 10/16/18 4w0d    SAB      8 SAB 09/12/18 4w0d    SAB      7 SAB 08/08/18 4w0d    SAB      6 Term 09/12/14 41w2d  4082 g (9 lb) F Vag-Spont EPI N DAVID   5 Term 12/12/12 40w4d  3884 g (8 lb 9 oz) F Vag-Spont EPI N DAVID   4 SAB 08/21/11 7w0d          3 Term 07/01/10 40w1d  3572 g (7 lb 14 oz) F Vag-Spont EPI N DAVID   2 Term 11/10/08 39w6d  3147 g (6 lb 15 oz) M Vag-Spont EPI N DAVID   1 Term 03/02/07 39w2d  3260 g (7 lb 3 oz) M Vag-Spont EPI N DAVID      Birth Comments: Augmented with pitocin      Obstetric Comments   Menarche 12          The following portions of the patient's history were reviewed and updated as appropriate: allergies, current medications, past family history, past medical history, past social history, past surgical history, and problem list.    Allergies:  Cefaclor    Medications:    Current Outpatient Medications:     metoclopramide (Reglan) 10 mg tablet, Take 1 tablet (10 mg total) by mouth 3 (three) times a day as needed (nausea), Disp: 90 tablet, Rfl: 3    ondansetron (ZOFRAN) 4 mg tablet, Take 1 tablet (4 mg total) by mouth every 8 (eight) hours as needed for nausea or vomiting, Disp: 20 tablet, Rfl: 0    Prenatal Vit-Fe Fumarate-FA (PRENATAL 1 PLUS 1 PO), Take by mouth, Disp: , Rfl:     Progesterone 200 MG CAPS, Insert 200 mg into the vagina 2 (two) times a day, Disp: 180 capsule, Rfl: 2    neomycin-polymyxin-dexamethasone (MAXITROL) ophthalmic suspension, INSTILL 1 DROP 4 TIMES A DAY INTO BOTH EYES X 5 DAYS (Patient not taking: Reported on 1/2/2025), Disp: , Rfl:     ondansetron (ZOFRAN) 4 mg tablet, Take 1 tablet (4 mg total) by mouth every 8 (eight) hours as needed for nausea or vomiting (Patient not taking: Reported on 1/2/2025), Disp: 20 tablet, Rfl: 0    sertraline (ZOLOFT) 25 mg tablet, TAKE 1 TABLET (25 MG TOTAL) BY MOUTH DAILY. (Patient not taking: Reported on 1/2/2025), Disp: 90 tablet, Rfl: 2      Review of Systems:   Review of Systems       Objective:       Visit Vitals  BP  "112/70 (BP Location: Left arm, Patient Position: Sitting, Cuff Size: Standard)   Ht 5' 6\" (1.676 m)   Wt 72.6 kg (160 lb)   LMP 12/06/2024   Breastfeeding No Comment: Pumping   BMI 25.82 kg/m²   OB Status Pregnant   Smoking Status Never   BSA 1.82 m²        GEN: The patient was alert and oriented x3, pleasant well-appearing female in no acute distress.   CV: Regular rate  PULM: nonlabored respirations  MSK: Normal gait  : WNL  Skin: warm, dry  Neuro: no focal deficits  Psych: normal affect and judgement, cooperative    Ultrasound:     Viability US     Gestational sac: present               Location: uterine  Yolk sac: present  Fetal pole: present               CRL: 0.88 cm = 6w6d  Cardiac activity: present               Rate: 127 bpm     Ovaries: normal appearing bilaterally, simple cyst on left  Cul de sac: absence of free fluid  Uterus: normal in appearance  Moderate sized subchorionic hemorrhage        Ultrasound Probe Disinfection    A transvaginal ultrasound was performed.   Prior to use, disinfection was performed with High Level Disinfection Process (Trophon)  Probe serial number [id number]: [id number] was used    Hanh Gu MD  01/31/25  1:33 PM    "

## 2025-02-03 ENCOUNTER — TELEPHONE (OUTPATIENT)
Age: 40
End: 2025-02-03

## 2025-02-11 ENCOUNTER — ULTRASOUND (OUTPATIENT)
Dept: OBGYN CLINIC | Facility: CLINIC | Age: 40
End: 2025-02-11
Payer: COMMERCIAL

## 2025-02-11 ENCOUNTER — PATIENT MESSAGE (OUTPATIENT)
Dept: OBGYN CLINIC | Facility: CLINIC | Age: 40
End: 2025-02-11

## 2025-02-11 VITALS — DIASTOLIC BLOOD PRESSURE: 70 MMHG | SYSTOLIC BLOOD PRESSURE: 116 MMHG | BODY MASS INDEX: 25.99 KG/M2 | WEIGHT: 161 LBS

## 2025-02-11 DIAGNOSIS — O02.1 MISSED ABORTION: Primary | ICD-10-CM

## 2025-02-11 PROCEDURE — S0190 MIFEPRISTONE, ORAL, 200 MG: HCPCS | Performed by: PHYSICIAN ASSISTANT

## 2025-02-11 PROCEDURE — 99214 OFFICE O/P EST MOD 30 MIN: CPT | Performed by: PHYSICIAN ASSISTANT

## 2025-02-11 PROCEDURE — S0191 MISOPROSTOL, ORAL, 200 MCG: HCPCS | Performed by: PHYSICIAN ASSISTANT

## 2025-02-11 RX ORDER — MISOPROSTOL 200 UG/1
800 TABLET ORAL ONCE
Status: COMPLETED | OUTPATIENT
Start: 2025-02-11 | End: 2025-02-11

## 2025-02-11 RX ORDER — MIFEPRISTONE 200 MG/1
200 TABLET ORAL ONCE
Status: COMPLETED | OUTPATIENT
Start: 2025-02-11 | End: 2025-02-11

## 2025-02-11 RX ADMIN — MISOPROSTOL 800 MCG: 200 TABLET ORAL at 11:57

## 2025-02-11 RX ADMIN — MIFEPRISTONE 200 MG: 200 TABLET ORAL at 11:57

## 2025-02-11 NOTE — PROGRESS NOTES
Assessment/Plan:  - Reviewed findings of missed ab with patient.   - Discussed expectant management, medication managment vs D&E  - Patient desires to proceed with medication management with Mifeprex 200mg and misoprostol 800mcg  - Reviewed how to take medication, dosing, expected course and timeframe of bleeding.   - Pt agreement form signed. Patient provided with medications and informational booklet  - Type & Screen A neg - will need rhogam at f/u US  - Patient expresses understanding. All questions answered  - Patient is to call with any questions or concerns  - Will plan for repeat US in 1 week.       Encounter Diagnosis     ICD-10-CM    1. Missed   O02.1 miFEPRIStone (Mifeprex) 200 mg     miSOPROStol (Cytotec) tablet 800 mcg              Subjective:       Patient ID: Silvina Barr 1985        Silvina Barr is a 39 y.o.  presenting to the office for pregnancy confirmation. Patient's last menstrual period was 2024. , placing her at 9w4d. She had an US 2 weeks ago placing her at 8w3d today.     OB History    Para Term  AB Living   15 7 6 1 8 7   SAB IAB Ectopic Multiple Live Births   8   0 7      # Outcome Date GA Lbr Maldonado/2nd Weight Sex Type Anes PTL Lv   15 SAB 25 8w3d    SAB         Birth Comments: missed ab   14  23 34w5d  2013 g (4 lb 7 oz) M CS-LTranv Spinal  DAVID      Complications: Fetal Intolerance   13 SAB 2022 4w0d          12 SAB 2021 8w0d          11 Term 10/23/20 40w3d / 00:06 3750 g (8 lb 4.3 oz) M Vag-Spont EPI  DAVID   10 SAB 2019 10w0d    SAB      9 SAB 10/16/18 4w0d    SAB      8 SAB 18 4w0d    SAB      7 SAB 18 4w0d    SAB      6 Term 14 41w2d  4082 g (9 lb) F Vag-Spont EPI N DAVID   5 Term 12 40w4d  3884 g (8 lb 9 oz) F Vag-Spont EPI N DAVID   4 SAB 11 7w0d          3 Term 07/01/10 40w1d  3572 g (7 lb 14 oz) F Vag-Spont EPI N DAVID   2 Term 11/10/08 39w6d  3147 g (6 lb 15 oz) M Vag-Spont EPI N DAVID   1  Term 07 39w2d  3260 g (7 lb 3 oz) M Vag-Spont EPI N DAVID      Birth Comments: Augmented with pitocin      Obstetric Comments   Menarche 12          The following portions of the patient's history were reviewed and updated as appropriate: She  has a past medical history of Allergy, Anxiety, Chronic endometritis, Cold intolerance, Gestational thrombocytopenia (HCC) (2020), Migraine, Miscarriage, Pelvic pressure in female, PONV (postoperative nausea and vomiting), Recurrent pregnancy loss, Status post primary low transverse  section at 34 weeks  (2023), Varicella (), Vomiting during pregnancy, and Wears glasses.  She   Patient Active Problem List    Diagnosis Date Noted    Anxiety     Migraine     S/P myomectomy 10/21/2019    Recurrent pregnancy loss 10/01/2018     She  has a past surgical history that includes [place] tooth extraction; Cyst Removal; Popliteal synovial cyst excision; pr hysteroscopy bx endometrium&/polypc w/wo d&c (N/A, 10/21/2019); Condyloma excision/fulguration (May 2007); Hysteroscopy (N/A, 2022); Polypectomy (N/A, 2022); and pr  delivery only (N/A, 2023).  Her family history includes Allergies in her mother; Autism in her brother; Breast cancer in her maternal aunt; Colon cancer in her father; Lung cancer in her maternal grandfather; Migraines in her mother; Miscarriages / Stillbirths in her mother and paternal grandmother; No Known Problems in her brother, daughter, daughter, daughter, maternal grandmother, paternal grandfather, son, son, and son; Sjogren's syndrome in her mother; Skin cancer in her father; Stroke (age of onset: 52) in her mother.  She  reports that she has never smoked. She has never used smokeless tobacco. She reports that she does not currently use alcohol. She reports that she does not use drugs.  Current Outpatient Medications   Medication Sig Dispense Refill    metoclopramide (Reglan) 10 mg tablet Take 1 tablet (10 mg total)  by mouth 3 (three) times a day as needed (nausea) 90 tablet 3    ondansetron (ZOFRAN) 4 mg tablet Take 1 tablet (4 mg total) by mouth every 8 (eight) hours as needed for nausea or vomiting 20 tablet 0    Prenatal Vit-Fe Fumarate-FA (PRENATAL 1 PLUS 1 PO) Take by mouth      Progesterone 200 MG CAPS Insert 200 mg into the vagina 2 (two) times a day 180 capsule 2    neomycin-polymyxin-dexamethasone (MAXITROL) ophthalmic suspension INSTILL 1 DROP 4 TIMES A DAY INTO BOTH EYES X 5 DAYS (Patient not taking: Reported on 1/2/2025)      ondansetron (ZOFRAN) 4 mg tablet Take 1 tablet (4 mg total) by mouth every 8 (eight) hours as needed for nausea or vomiting (Patient not taking: Reported on 1/2/2025) 20 tablet 0    sertraline (ZOLOFT) 25 mg tablet TAKE 1 TABLET (25 MG TOTAL) BY MOUTH DAILY. (Patient not taking: Reported on 1/2/2025) 90 tablet 2     No current facility-administered medications for this visit.     Current Outpatient Medications on File Prior to Visit   Medication Sig    metoclopramide (Reglan) 10 mg tablet Take 1 tablet (10 mg total) by mouth 3 (three) times a day as needed (nausea)    ondansetron (ZOFRAN) 4 mg tablet Take 1 tablet (4 mg total) by mouth every 8 (eight) hours as needed for nausea or vomiting    Prenatal Vit-Fe Fumarate-FA (PRENATAL 1 PLUS 1 PO) Take by mouth    Progesterone 200 MG CAPS Insert 200 mg into the vagina 2 (two) times a day    neomycin-polymyxin-dexamethasone (MAXITROL) ophthalmic suspension INSTILL 1 DROP 4 TIMES A DAY INTO BOTH EYES X 5 DAYS (Patient not taking: Reported on 1/2/2025)    ondansetron (ZOFRAN) 4 mg tablet Take 1 tablet (4 mg total) by mouth every 8 (eight) hours as needed for nausea or vomiting (Patient not taking: Reported on 1/2/2025)    sertraline (ZOLOFT) 25 mg tablet TAKE 1 TABLET (25 MG TOTAL) BY MOUTH DAILY. (Patient not taking: Reported on 1/2/2025)     No current facility-administered medications on file prior to visit.     She is allergic to  cefaclor..    Allergies:  Cefaclor    Medications:    Current Outpatient Medications:     metoclopramide (Reglan) 10 mg tablet, Take 1 tablet (10 mg total) by mouth 3 (three) times a day as needed (nausea), Disp: 90 tablet, Rfl: 3    ondansetron (ZOFRAN) 4 mg tablet, Take 1 tablet (4 mg total) by mouth every 8 (eight) hours as needed for nausea or vomiting, Disp: 20 tablet, Rfl: 0    Prenatal Vit-Fe Fumarate-FA (PRENATAL 1 PLUS 1 PO), Take by mouth, Disp: , Rfl:     Progesterone 200 MG CAPS, Insert 200 mg into the vagina 2 (two) times a day, Disp: 180 capsule, Rfl: 2    neomycin-polymyxin-dexamethasone (MAXITROL) ophthalmic suspension, INSTILL 1 DROP 4 TIMES A DAY INTO BOTH EYES X 5 DAYS (Patient not taking: Reported on 1/2/2025), Disp: , Rfl:     ondansetron (ZOFRAN) 4 mg tablet, Take 1 tablet (4 mg total) by mouth every 8 (eight) hours as needed for nausea or vomiting (Patient not taking: Reported on 1/2/2025), Disp: 20 tablet, Rfl: 0    sertraline (ZOLOFT) 25 mg tablet, TAKE 1 TABLET (25 MG TOTAL) BY MOUTH DAILY. (Patient not taking: Reported on 1/2/2025), Disp: 90 tablet, Rfl: 2  No current facility-administered medications for this visit.      Review of Systems:   Review of Systems   Gastrointestinal:  Positive for nausea.   Genitourinary:  Negative for vaginal bleeding.          Objective:       Visit Vitals  /70   Wt 73 kg (161 lb)   LMP 12/06/2024   BMI 25.99 kg/m²   OB Status Unknown   Smoking Status Never   BSA 1.82 m²        GEN: The patient was alert and oriented x3, pleasant well-appearing female in no acute distress.   PULM: nonlabored respirations  MSK: Normal gait  : WNL  Skin: warm, dry  Neuro: no focal deficits  Psych: normal affect and judgement, cooperative    Ultrasound:     Viability US     Gestational sac: present               Location: intrauterine    Size: 2.52cm (7w2d)  Yolk sac: present, large, expanded, abnormal appearing  Fetal pole: present               CRL: 1.69 cm =  8w1d  Cardiac activity: absent, no color flow            Ovaries: normal appearing bilaterally  Cul de sac: absence of free fluid  Uterus: normal in appearance           Ultrasound Probe Disinfection    A transvaginal ultrasound was performed.   Prior to use, disinfection was performed with High Level Disinfection Process ([employer])  Probe serial number [id number]: [id number] was used    Jazmin Mei PA-C  02/11/25  12:36 PM

## 2025-02-12 ENCOUNTER — ANESTHESIA EVENT (OUTPATIENT)
Dept: PERIOP | Facility: HOSPITAL | Age: 40
End: 2025-02-12
Payer: COMMERCIAL

## 2025-02-12 ENCOUNTER — TELEPHONE (OUTPATIENT)
Dept: OBGYN CLINIC | Facility: CLINIC | Age: 40
End: 2025-02-12

## 2025-02-12 NOTE — TELEPHONE ENCOUNTER
.Called and spoke with pt.   Surgery is now scheduled.   Please see the [facility] OB GYN Department Surgery Scheduling Sheet in this encounter for further information.

## 2025-02-12 NOTE — TELEPHONE ENCOUNTER
.[facility] OB GYN Department  Surgery Scheduling Sheet    Patient Name: Silvina Barr  : 1985    Provider: Dr. Hanh Gu / Jazmin Mei PA-C     Needed: no; Language: N/A    Procedure: exam under anesthesia and dilation and evacuation at 8 weeks    Diagnosis: missed AB    Special Needs or Equipment: none    Anesthesia: IV sedation with anesthesia    Length of stay: outpatient  Does patient have comorbid conditions that will require close perioperative monitoring prior to safe discharge: no    The patient has comorbid conditions that will require close perioperative monitoring prior to safe discharge, including N/A.   This may require acute care beyond the usual and routine recovery period. As such, inpatient admission post-operatively is expected and appropriate, and anticipated hospital length of stay will be >2 midnights.    Pre-Admission Testing Needed: no   Labs that should be ordered: type and screen on admit    Order PAT that is recommended in prep for procedure?: Not Indicated    Medical Clearance Needed: no; Provider: N/A    MA Form Signed (tubals/hysterectomy): Not Indicated    Surgical Drink Given: no     How many days out of work: 1 day(s)     How many days no drivin day(s)       Is pre op appt needed?  no  Interval for post op appt: 2 week(s)       For Surgical Scheduler:     Surgery Scheduled On: THURS. 25-MORNING  [place]: [facility]    Pre-op Appt: ON ADMIT   Post op Appt: 25  Consult/Medical clearance appt: N/A

## 2025-02-13 ENCOUNTER — HOSPITAL ENCOUNTER (OUTPATIENT)
Facility: HOSPITAL | Age: 40
Setting detail: OUTPATIENT SURGERY
Discharge: HOME | End: 2025-02-13
Attending: OBSTETRICS & GYNECOLOGY | Admitting: OBSTETRICS & GYNECOLOGY
Payer: COMMERCIAL

## 2025-02-13 ENCOUNTER — ANESTHESIA (OUTPATIENT)
Dept: PERIOP | Facility: HOSPITAL | Age: 40
End: 2025-02-13
Payer: COMMERCIAL

## 2025-02-13 VITALS
OXYGEN SATURATION: 100 % | HEART RATE: 76 BPM | DIASTOLIC BLOOD PRESSURE: 56 MMHG | RESPIRATION RATE: 18 BRPM | TEMPERATURE: 98 F | SYSTOLIC BLOOD PRESSURE: 124 MMHG

## 2025-02-13 DIAGNOSIS — O02.1 MISSED ABORTION: Primary | ICD-10-CM

## 2025-02-13 DIAGNOSIS — O02.1 MISSED AB: ICD-10-CM

## 2025-02-13 PROCEDURE — 59820 CARE OF MISCARRIAGE: CPT | Performed by: OBSTETRICS & GYNECOLOGY

## 2025-02-13 PROCEDURE — 86900 BLOOD TYPING SEROLOGIC ABO: CPT | Performed by: OBSTETRICS & GYNECOLOGY

## 2025-02-13 PROCEDURE — 86901 BLOOD TYPING SEROLOGIC RH(D): CPT | Performed by: OBSTETRICS & GYNECOLOGY

## 2025-02-13 PROCEDURE — NC001 PR NO CHARGE: Performed by: OBSTETRICS & GYNECOLOGY

## 2025-02-13 PROCEDURE — 86850 RBC ANTIBODY SCREEN: CPT | Performed by: OBSTETRICS & GYNECOLOGY

## 2025-02-13 PROCEDURE — 88305 TISSUE EXAM BY PATHOLOGIST: CPT | Performed by: PATHOLOGY

## 2025-02-13 RX ORDER — ACETAMINOPHEN 325 MG/1
650 TABLET ORAL EVERY 6 HOURS PRN
Status: DISCONTINUED | OUTPATIENT
Start: 2025-02-13 | End: 2025-02-13 | Stop reason: HOSPADM

## 2025-02-13 RX ORDER — FENTANYL CITRATE 50 UG/ML
INJECTION, SOLUTION INTRAMUSCULAR; INTRAVENOUS AS NEEDED
Status: DISCONTINUED | OUTPATIENT
Start: 2025-02-13 | End: 2025-02-13

## 2025-02-13 RX ORDER — DIPHENHYDRAMINE HYDROCHLORIDE 50 MG/ML
12.5 INJECTION INTRAMUSCULAR; INTRAVENOUS ONCE AS NEEDED
Status: DISCONTINUED | OUTPATIENT
Start: 2025-02-13 | End: 2025-02-13 | Stop reason: HOSPADM

## 2025-02-13 RX ORDER — ONDANSETRON 2 MG/ML
INJECTION INTRAMUSCULAR; INTRAVENOUS AS NEEDED
Status: DISCONTINUED | OUTPATIENT
Start: 2025-02-13 | End: 2025-02-13

## 2025-02-13 RX ORDER — SODIUM CHLORIDE, SODIUM LACTATE, POTASSIUM CHLORIDE, CALCIUM CHLORIDE 600; 310; 30; 20 MG/100ML; MG/100ML; MG/100ML; MG/100ML
INJECTION, SOLUTION INTRAVENOUS CONTINUOUS PRN
Status: DISCONTINUED | OUTPATIENT
Start: 2025-02-13 | End: 2025-02-13

## 2025-02-13 RX ORDER — FENTANYL CITRATE/PF 50 MCG/ML
25 SYRINGE (ML) INJECTION
Status: DISCONTINUED | OUTPATIENT
Start: 2025-02-13 | End: 2025-02-13 | Stop reason: HOSPADM

## 2025-02-13 RX ORDER — KETOROLAC TROMETHAMINE 30 MG/ML
INJECTION, SOLUTION INTRAMUSCULAR; INTRAVENOUS AS NEEDED
Status: DISCONTINUED | OUTPATIENT
Start: 2025-02-13 | End: 2025-02-13

## 2025-02-13 RX ORDER — METHYLERGONOVINE MALEATE 0.2 MG/1
0.2 TABLET ORAL EVERY 8 HOURS
Qty: 9 TABLET | Refills: 0 | Status: SHIPPED | OUTPATIENT
Start: 2025-02-13 | End: 2025-02-16

## 2025-02-13 RX ORDER — MISOPROSTOL 100 UG/1
TABLET ORAL AS NEEDED
Status: DISCONTINUED | OUTPATIENT
Start: 2025-02-13 | End: 2025-02-13 | Stop reason: HOSPADM

## 2025-02-13 RX ORDER — DEXAMETHASONE SODIUM PHOSPHATE 10 MG/ML
INJECTION, SOLUTION INTRAMUSCULAR; INTRAVENOUS AS NEEDED
Status: DISCONTINUED | OUTPATIENT
Start: 2025-02-13 | End: 2025-02-13

## 2025-02-13 RX ORDER — PROPOFOL 10 MG/ML
INJECTION, EMULSION INTRAVENOUS CONTINUOUS PRN
Status: DISCONTINUED | OUTPATIENT
Start: 2025-02-13 | End: 2025-02-13

## 2025-02-13 RX ORDER — IBUPROFEN 600 MG/1
600 TABLET, FILM COATED ORAL EVERY 6 HOURS PRN
Status: DISCONTINUED | OUTPATIENT
Start: 2025-02-13 | End: 2025-02-13 | Stop reason: HOSPADM

## 2025-02-13 RX ORDER — ONDANSETRON 2 MG/ML
4 INJECTION INTRAMUSCULAR; INTRAVENOUS ONCE AS NEEDED
Status: DISCONTINUED | OUTPATIENT
Start: 2025-02-13 | End: 2025-02-13 | Stop reason: HOSPADM

## 2025-02-13 RX ORDER — ALBUTEROL SULFATE 0.83 MG/ML
2.5 SOLUTION RESPIRATORY (INHALATION) ONCE AS NEEDED
Status: DISCONTINUED | OUTPATIENT
Start: 2025-02-13 | End: 2025-02-13 | Stop reason: HOSPADM

## 2025-02-13 RX ORDER — PROPOFOL 10 MG/ML
INJECTION, EMULSION INTRAVENOUS AS NEEDED
Status: DISCONTINUED | OUTPATIENT
Start: 2025-02-13 | End: 2025-02-13

## 2025-02-13 RX ORDER — MIDAZOLAM HYDROCHLORIDE 2 MG/2ML
INJECTION, SOLUTION INTRAMUSCULAR; INTRAVENOUS AS NEEDED
Status: DISCONTINUED | OUTPATIENT
Start: 2025-02-13 | End: 2025-02-13

## 2025-02-13 RX ORDER — LIDOCAINE HYDROCHLORIDE 10 MG/ML
INJECTION, SOLUTION EPIDURAL; INFILTRATION; INTRACAUDAL; PERINEURAL AS NEEDED
Status: DISCONTINUED | OUTPATIENT
Start: 2025-02-13 | End: 2025-02-13

## 2025-02-13 RX ORDER — DOXYCYCLINE 100 MG/1
200 CAPSULE ORAL ONCE
Status: COMPLETED | OUTPATIENT
Start: 2025-02-13 | End: 2025-02-13

## 2025-02-13 RX ORDER — ONDANSETRON 2 MG/ML
4 INJECTION INTRAMUSCULAR; INTRAVENOUS EVERY 6 HOURS PRN
Status: DISCONTINUED | OUTPATIENT
Start: 2025-02-13 | End: 2025-02-13 | Stop reason: HOSPADM

## 2025-02-13 RX ORDER — SCOPOLAMINE 1 MG/3D
1 PATCH, EXTENDED RELEASE TRANSDERMAL
Status: DISCONTINUED | OUTPATIENT
Start: 2025-02-13 | End: 2025-02-13 | Stop reason: HOSPADM

## 2025-02-13 RX ADMIN — DEXAMETHASONE SODIUM PHOSPHATE 10 MG: 10 INJECTION INTRAMUSCULAR; INTRAVENOUS at 08:41

## 2025-02-13 RX ADMIN — PROPOFOL 50 MG: 10 INJECTION, EMULSION INTRAVENOUS at 08:51

## 2025-02-13 RX ADMIN — MIDAZOLAM 1 MG: 1 INJECTION INTRAMUSCULAR; INTRAVENOUS at 08:29

## 2025-02-13 RX ADMIN — SCOPOLAMINE 1 PATCH: 1.5 PATCH, EXTENDED RELEASE TRANSDERMAL at 07:46

## 2025-02-13 RX ADMIN — FENTANYL CITRATE 25 MCG: 50 INJECTION INTRAMUSCULAR; INTRAVENOUS at 08:47

## 2025-02-13 RX ADMIN — PROPOFOL 130 MG: 10 INJECTION, EMULSION INTRAVENOUS at 08:31

## 2025-02-13 RX ADMIN — SODIUM CHLORIDE, SODIUM LACTATE, POTASSIUM CHLORIDE, AND CALCIUM CHLORIDE: .6; .31; .03; .02 INJECTION, SOLUTION INTRAVENOUS at 08:24

## 2025-02-13 RX ADMIN — PROPOFOL 100 MCG/KG/MIN: 10 INJECTION, EMULSION INTRAVENOUS at 08:32

## 2025-02-13 RX ADMIN — FENTANYL CITRATE 50 MCG: 50 INJECTION INTRAMUSCULAR; INTRAVENOUS at 08:38

## 2025-02-13 RX ADMIN — MIDAZOLAM 1 MG: 1 INJECTION INTRAMUSCULAR; INTRAVENOUS at 08:28

## 2025-02-13 RX ADMIN — FENTANYL CITRATE 25 MCG: 50 INJECTION INTRAMUSCULAR; INTRAVENOUS at 08:45

## 2025-02-13 RX ADMIN — ONDANSETRON 4 MG: 2 INJECTION INTRAMUSCULAR; INTRAVENOUS at 08:55

## 2025-02-13 RX ADMIN — ACETAMINOPHEN 650 MG: 325 TABLET, FILM COATED ORAL at 10:25

## 2025-02-13 RX ADMIN — ONDANSETRON 4 MG: 2 INJECTION INTRAMUSCULAR; INTRAVENOUS at 10:25

## 2025-02-13 RX ADMIN — DOXYCYCLINE 200 MG: 100 CAPSULE ORAL at 07:46

## 2025-02-13 RX ADMIN — HUMAN RHO(D) IMMUNE GLOBULIN 300 MCG: 300 INJECTION, SOLUTION INTRAMUSCULAR at 10:17

## 2025-02-13 RX ADMIN — LIDOCAINE HYDROCHLORIDE 40 MG: 10 INJECTION, SOLUTION EPIDURAL; INFILTRATION; INTRACAUDAL at 08:31

## 2025-02-13 RX ADMIN — KETOROLAC TROMETHAMINE 30 MG: 30 INJECTION, SOLUTION INTRAMUSCULAR; INTRAVENOUS at 08:58

## 2025-02-13 NOTE — ANESTHESIA PREPROCEDURE EVALUATION
Procedure:  EXAM UNDER ANESTHESIA; DILATATION AND EVACUATION (D&E) (8 WEEKS) (Uterus)    Relevant Problems   CARDIO   (+) Migraine      NEURO/PSYCH   (+) Anxiety   (+) Migraine        Physical Exam    Airway    Mallampati score: I  TM Distance: >3 FB  Neck ROM: full     Dental       Cardiovascular  Cardiovascular exam normal    Pulmonary  Pulmonary exam normal     Other Findings  post-pubertal.      Anesthesia Plan  ASA Score- 2     Anesthesia Type- IV sedation with anesthesia with ASA Monitors.         Additional Monitors:     Airway Plan:            Plan Factors-Exercise tolerance (METS): >4 METS.    Chart reviewed. EKG reviewed. Imaging results reviewed. Existing labs reviewed. Patient summary reviewed.                  Induction- intravenous.    Postoperative Plan- Plan for postoperative opioid use. Planned trial extubation        Informed Consent- Anesthetic plan and risks discussed with patient.  I personally reviewed this patient with the CRNA. Discussed and agreed on the Anesthesia Plan with the CRNA..      NPO Status:  No vitals data found for the desired time range.

## 2025-02-13 NOTE — ANESTHESIA POSTPROCEDURE EVALUATION
Post-Op Assessment Note    CV Status:  Stable  Pain Score: 0    Pain management: adequate       Mental Status:  Alert and awake   Hydration Status:  Euvolemic and stable   PONV Controlled:  Controlled   Airway Patency:  Patent and adequate     Post Op Vitals Reviewed: Yes    No anethesia notable event occurred.    Staff: CRNA       Last Filed PACU Vitals:  Vitals Value Taken Time   Temp 98.9 °F (37.2 °C) 02/13/25 0906   Pulse 81 02/13/25 0906   /59 02/13/25 0906   Resp 17 02/13/25 0906   SpO2 100 % 02/13/25 0906       Modified Debora:     Vitals Value Taken Time   Activity 0 02/13/25 0906   Respiration 2 02/13/25 0906   Circulation 2 02/13/25 0906   Consciousness 0 02/13/25 0906   Oxygen Saturation 1 02/13/25 0906     Modified Debora Score: 5

## 2025-02-13 NOTE — OP NOTE
OPERATIVE REPORT  PATIENT NAME: Silvina Barr    :  1985  MRN: [id number]  Pt Location: [facility] OR ROOM 03    SURGERY DATE: 2025    Surgeons and Role:     * Hanh Gu MD - Primary     * Jose Alfredo Foss MD - Assisting    Preop Diagnosis:  Missed ab [O02.1]    Post-Op Diagnosis Codes:     * Missed ab [O02.1]    Procedure(s):  EXAM UNDER ANESTHESIA; DILATATION AND EVACUATION (D&E) (8 WEEKS)    Specimen(s):  ID Type Source Tests Collected by Time Destination   1 : PRODUCTS OF CONCEPTION Tissue Products of Conception TISSUE EXAM Hanh Gu MD 2025 0839    2 : PRODUCTS OF CONCEPTION Tissue Products of Conception CHROMOSOME ANALYSIS, PRODUCTS OF CONCEPTION Hanh Gu MD 2025 0901      Estimated Blood Loss:   Minimal    Drains:  [REMOVED] Urethral Catheter Straight-tip 12 Fr. (Removed)   Number of days: 0     Anesthesia Type:   IV Sedation with Anesthesia    Operative Indications:  Missed ab [O02.1]    Operative Findings:  Normal appearing external female genitalia  Normal appearing vagina  Normal appearing cervix with descensus  Products of conception visualized with suction cannula and collection canister  On bimanual exam following D&C, uterus anteverted, small in size and firm, mobile with no obviously palpable uterine or adnexal masses      Complications:   None apparent    Procedure and Technique:  Patient received 200mg doxycyline PO in holding.     Patient was taken to the operating room. Monitored anesthesia care (MAC) with local  was administered and the patient was positioned on the OR table in the dorsal lithotomy position. All pressure points were padded and warm blankets were placed to maintain control of core body temperature. The patient was prepped and draped in the usual sterile fashion.    A time out was performed to confirm correct patient and correct procedure. A straight catheter was introduced into the bladder, which was drained of 5cc of  clear yellow urine. A weighted speculum was inserted into the vagina and a Kerri retractor was used to visualize the anterior lip of the cervix, which was then grasped with a single toothed tenaculum. The cervix was serially dilated using [place] dilators to 24 [place]ian to accommodate the suction device. An 8mm curved suction tip was selected. This was advanced to the fundus and suction was activated. The products of conception were collected in the suction trap. The uterus was felt to clamp down. Several passes with the suction cannula were completed throughought the uterine cavity. The uterus was massaged and found to be firm. The single toothed tenaculum was removed from the anterior lip of the cervix. Good hemostasis was confirmed at the tenaculum sites. 1000mcg of misoprostol were placed in the rectum. All instrumentation was then removed from the vagina.     At the conclusion of the procedure, all needle, sponge, and instrument counts were noted to be correct x2. The patient was transferred to the PACU in stable condition.    Attending physician Dr. Gu was present and participated in all key portions of the case.    Patient Disposition:  PACU     SIGNATURE: Jose Alfredo Foss MD  DATE: February 13, 2025  TIME: 9:08 AM

## 2025-02-13 NOTE — DISCHARGE INSTR - AVS FIRST PAGE
"Post-Gynecologic Surgery Discharge Instructions:  Nothing in the vagina for until cleared at your post-operative visit  Call the office for fever greater than 100.4F, heavy vaginal bleeding, or increasing pain    Post Operative Pain Management:  For pain, you may take 650mg of Tylenol every 6 hours  For cramping, you may take 600mg of ibuprofen every 6 hours    You can alternate Tylenol and ibuprofen and take them \"around the clock\" to stay ahead of pain. For example, take 650mg of Tylenol at 9 AM, then 600mg of ibuprofen at 12 PM, then 650mg of Tylenol at 3 PM, and so forth.     If you have any questions regarding your post-operative course, please call your doctor.  "

## 2025-02-13 NOTE — H&P
Assessment /Plan:     39 y.o.  with Patient's last menstrual period was 2024. with  a 7 week missed    for  exam under anesthesia, suction D+C  .  The risks (infection, bleeding, transfusion, damage to adjacent organs requiring immediate and/or delayed repair, clots inside blood vessels, need to complete procedure using alternative approach, procedural failure, worsening of pre-exisiting medical condition, and death) and alternatives  have been discussed and patient desires to proceed with exam under anesthesia, suction D+C  at [facility] on 2025 .    Consent was reviewed in detail and signed today  Preoperative testing and antibiotics were discussed   Postoperative course discussed and post op medications were reviewed     Chief Complaint: missed     HPI:  Pt is a 39 y.o.  with Patient's last menstrual period was 2024. Presented for her follow up dating scan and was found to have a 7 week missed .  She has chosen to proceed with suction D+C    PMHx:   Past Medical History:   Diagnosis Date    Allergy     seasonal allergy    Anxiety     Chronic endometritis     Cold intolerance     Resolved 12/15/2015     Gestational thrombocytopenia (HCC) 2020    Migraine     Miscarriage     x 5    Pelvic pressure in female     Resolved 12/15/2015     PONV (postoperative nausea and vomiting)     Patient requests to be pre-medicated for N/V prior to all surgeries. Also notes some light-headedness and slow to awake s/p anesthesia    Recurrent pregnancy loss     Status post primary low transverse  section at 34 weeks  2023    Varicella 1989    as a child    Vomiting during pregnancy     Resolved 12/15/2015     Wears glasses        PSHx:   Past Surgical History:   Procedure Laterality Date    CONDYLOMA EXCISION/FULGURATION  May 2007    during her first pregnancy;     CYST REMOVAL      HYSTEROSCOPY N/A 2022    Procedure: HYSTEROSCOPY W/ ENDOMETRIAL  BIOPSY;  Surgeon: Tara Budinetz, DO;  [facility]: [facility];  Service: Gynecology    POLYPECTOMY N/A 2022    Procedure: POLYPECTOMY;  Surgeon: Tara Budinetz, DO;  [facility];  Service: Gynecology    POPLITEAL SYNOVIAL CYST EXCISION      Resolved 2007     [place]  DELIVERY ONLY N/A 2023    Procedure:  SECTION ();  Surgeon: Bryanna Valentino DO;  Location: [facility];  Service: Obstetrics    [place] HYSTEROSCOPY BX ENDOMETRIUM&/POLYPC W/WO D&C N/A 10/21/2019    Procedure: HYSTEROSCOPY; BIOPSY (MYOSURE); polypectomy;  Surgeon: Tara Budinetz, DO;  [facility];  Service: Gynecology    WISDOM TOOTH EXTRACTION         Meds:   Medications Prior to Admission:     metoclopramide (Reglan) 10 mg tablet    ondansetron (ZOFRAN) 4 mg tablet    Prenatal Vit-Fe Fumarate-FA (PRENATAL 1 PLUS 1 PO)    Progesterone 200 MG CAPS    neomycin-polymyxin-dexamethasone (MAXITROL) ophthalmic suspension    ondansetron (ZOFRAN) 4 mg tablet    sertraline (ZOLOFT) 25 mg tablet    Allergies:   Allergies   Allergen Reactions    Cefaclor Hives       Social Hx:    Social History     Socioeconomic History    Marital status: /Civil Union     Spouse name: Not on file    Number of children: Not on file    Years of education: Not on file    Highest education level: Not on file   Occupational History    Not on file   Tobacco Use    Smoking status: Never    Smokeless tobacco: Never   Vaping Use    Vaping status: Never Used   Substance and Sexual Activity    Alcohol use: Not Currently    Drug use: Never    Sexual activity: Yes     Partners: Male     Birth control/protection: Abstinence, None     Comment: x 13 years George   Other Topics Concern    Not on file   Social History Narrative    Always uses seat belt    Feels safe at home         Consumes 2 cups of coffee per day     Social Drivers of Health     Financial Resource Strain: Not on file   Food Insecurity: Not on file   Transportation Needs: Not on  file   Physical Activity: Not on file   Stress: Not on file   Social Connections: Not on file   Intimate Partner Violence: Not on file   Housing Stability: Not on file       Ob Hx:   OB History    Para Term  AB Living   15 7 6 1 8 7   SAB IAB Ectopic Multiple Live Births   8   0 7      # Outcome Date GA Lbr Maldonado/2nd Weight Sex Type Anes PTL Lv   15 SAB 25 8w3d    SAB         Birth Comments: missed ab   14  23 34w5d  2013 g (4 lb 7 oz) M CS-LTranv Spinal  DAVID      Complications: Fetal Intolerance   13 SAB 2022 4w0d          12 SAB 2021 8w0d          11 Term 10/23/20 40w3d / 00:06 3750 g (8 lb 4.3 oz) M Vag-Spont EPI  DAVID   10 SAB 2019 10w0d    SAB      9 SAB 10/16/18 4w0d    SAB      8 SAB 18 4w0d    SAB      7 SAB 18 4w0d    SAB      6 Term 14 41w2d  4082 g (9 lb) F Vag-Spont EPI N DAVID   5 Term 12 40w4d  3884 g (8 lb 9 oz) F Vag-Spont EPI N DAVID   4 SAB 11 7w0d          3 Term 07/01/10 40w1d  3572 g (7 lb 14 oz) F Vag-Spont EPI N DAVID   2 Term 11/10/08 39w6d  3147 g (6 lb 15 oz) M Vag-Spont EPI N DAVID   1 Term 07 39w2d  3260 g (7 lb 3 oz) M Vag-Spont EPI N DAVID      Birth Comments: Augmented with pitocin      Obstetric Comments   Menarche 12        Gyn HX:  denies STD, abnormal pap, significant dysmenorrhea or irregular menses    Fm Hx:   Family History   Problem Relation Age of Onset    Migraines Mother     Miscarriages / Stillbirths Mother         1 miscarriage    Allergies Mother     Sjogren's syndrome Mother     Stroke Mother 52        TIA    Colon cancer Father     Skin cancer Father     No Known Problems Brother     Autism Brother     No Known Problems Daughter     No Known Problems Daughter     No Known Problems Daughter     No Known Problems Son     No Known Problems Son     No Known Problems Son     No Known Problems Maternal Grandmother     Lung cancer Maternal Grandfather     Miscarriages / Stillbirths Paternal Grandmother         2  miscarriages    No Known Problems Paternal Grandfather     Breast cancer Maternal Aunt        ROS:  Review of Systems   Constitutional:  Positive for fever.   HENT: Negative.     Respiratory: Negative.     Cardiovascular: Negative.    Genitourinary: Negative.    Neurological: Negative.    Psychiatric/Behavioral: Negative.         VS:  /66   Pulse 77   Temp 98.6 °F (37 °C) (Temporal)   Resp 18   LMP 12/06/2024   SpO2 98%       Exam:    Physical Exam  Vitals and nursing note reviewed.   Constitutional:       Appearance: Normal appearance. She is normal weight.   HENT:      Head: Normocephalic and atraumatic.   Eyes:      Extraocular Movements: Extraocular movements intact.      Conjunctiva/sclera: Conjunctivae normal.      Pupils: Pupils are equal, round, and reactive to light.   Cardiovascular:      Rate and Rhythm: Normal rate and regular rhythm.      Heart sounds: Normal heart sounds. No murmur heard.  Pulmonary:      Effort: Pulmonary effort is normal. No respiratory distress.      Breath sounds: Normal breath sounds. No wheezing or rales.   Musculoskeletal:      Right lower leg: No edema.      Left lower leg: No edema.   Skin:     General: Skin is warm and dry.   Neurological:      General: No focal deficit present.      Mental Status: She is alert and oriented to person, place, and time.   Psychiatric:         Mood and Affect: Mood normal.         Behavior: Behavior normal.              /66   Pulse 77   Temp 98.6 °F (37 °C) (Temporal)   Resp 18   LMP 12/06/2024   SpO2 98%

## 2025-02-13 NOTE — ANESTHESIA POSTPROCEDURE EVALUATION
Post-Op Assessment Note            No anethesia notable event occurred.            Last Filed PACU Vitals:  Vitals Value Taken Time   Temp 98 °F (36.7 °C) 02/13/25 1014   Pulse 76 02/13/25 1014   /56 02/13/25 1014   Resp 18 02/13/25 1014   SpO2 100 % 02/13/25 1014       Modified Debora:     Vitals Value Taken Time   Activity 2 02/13/25 0930   Respiration 2 02/13/25 0930   Circulation 2 02/13/25 0930   Consciousness 2 02/13/25 0930   Oxygen Saturation 2 02/13/25 0930     Modified Debora Score: 10

## 2025-02-18 PROCEDURE — 88305 TISSUE EXAM BY PATHOLOGIST: CPT | Performed by: PATHOLOGY

## 2025-02-27 LAB — SCAN RESULT: NORMAL

## 2025-03-07 ENCOUNTER — OFFICE VISIT (OUTPATIENT)
Dept: OBGYN CLINIC | Facility: CLINIC | Age: 40
End: 2025-03-07

## 2025-03-07 VITALS
WEIGHT: 158 LBS | HEIGHT: 66 IN | DIASTOLIC BLOOD PRESSURE: 80 MMHG | BODY MASS INDEX: 25.39 KG/M2 | SYSTOLIC BLOOD PRESSURE: 110 MMHG

## 2025-03-07 DIAGNOSIS — Z09 POSTOPERATIVE EXAMINATION: Primary | ICD-10-CM

## 2025-03-07 DIAGNOSIS — G43.909 MIGRAINE: ICD-10-CM

## 2025-03-07 DIAGNOSIS — Z12.31 SCREENING MAMMOGRAM FOR BREAST CANCER: ICD-10-CM

## 2025-03-07 PROBLEM — O02.1 MISSED ABORTION: Status: RESOLVED | Noted: 2025-02-13 | Resolved: 2025-03-07

## 2025-03-07 PROCEDURE — 99024 POSTOP FOLLOW-UP VISIT: CPT | Performed by: OBSTETRICS & GYNECOLOGY

## 2025-03-07 RX ORDER — METOCLOPRAMIDE 10 MG/1
10 TABLET ORAL 3 TIMES DAILY PRN
Qty: 90 TABLET | Refills: 3 | Status: SHIPPED | OUTPATIENT
Start: 2025-03-07 | End: 2025-03-07

## 2025-03-07 NOTE — PROGRESS NOTES
"Subjective     Silvina Barr is a 39 y.o. female who presents to the clinic 2 weeks status post  suction D+C  for  missed  . Eating a regular diet without difficulty. Bowel movements are normal. The patient is not having any pain.  She is having some spotting.     The following portions of the patient's history were reviewed and updated as appropriate: allergies, current medications, past family history, past medical history, past social history, past surgical history, and problem list.    Review of Systems  Pertinent items are noted in HPI.      Objective     /80 (BP Location: Left arm, Patient Position: Sitting, Cuff Size: Standard)   Ht 5' 6\" (1.676 m)   Wt 71.7 kg (158 lb)   Breastfeeding No   BMI 25.50 kg/m²   General:  alert and oriented, in no acute distress     Assessment      Doing well postoperatively.  Operative findings again reviewed. Pathology report discussed.      Plan     1.  considering vasectomy  2. Reviewed genetics.  3. Activity restrictions: none  4. Anticipated return to work: now.  5. Follow up: annual  "

## 2025-03-25 DIAGNOSIS — G43.909 MIGRAINE: Primary | ICD-10-CM

## 2025-03-25 RX ORDER — TOPIRAMATE 25 MG/1
25 TABLET, FILM COATED ORAL 2 TIMES DAILY
Qty: 70 TABLET | Refills: 0 | Status: SHIPPED | OUTPATIENT
Start: 2025-03-25

## 2025-03-26 DIAGNOSIS — G43.909 MIGRAINE: Primary | ICD-10-CM

## 2025-03-26 RX ORDER — PROCHLORPERAZINE MALEATE 10 MG
10 TABLET ORAL EVERY 6 HOURS PRN
Qty: 30 TABLET | Refills: 0 | Status: SHIPPED | OUTPATIENT
Start: 2025-03-26

## 2025-04-30 DIAGNOSIS — G43.909 MIGRAINE: ICD-10-CM

## 2025-05-01 ENCOUNTER — TELEPHONE (OUTPATIENT)
Dept: NEUROLOGY | Facility: CLINIC | Age: 40
End: 2025-05-01

## 2025-05-01 RX ORDER — TOPIRAMATE 50 MG/1
50 TABLET, FILM COATED ORAL 2 TIMES DAILY
Qty: 60 TABLET | Refills: 6 | Status: SHIPPED | OUTPATIENT
Start: 2025-05-01

## 2025-05-16 ENCOUNTER — OFFICE VISIT (OUTPATIENT)
Dept: NEUROLOGY | Facility: CLINIC | Age: 40
End: 2025-05-16
Payer: COMMERCIAL

## 2025-05-16 VITALS
HEIGHT: 66 IN | TEMPERATURE: 98.4 F | HEART RATE: 75 BPM | WEIGHT: 154 LBS | OXYGEN SATURATION: 98 % | BODY MASS INDEX: 24.75 KG/M2 | SYSTOLIC BLOOD PRESSURE: 104 MMHG | DIASTOLIC BLOOD PRESSURE: 68 MMHG

## 2025-05-16 DIAGNOSIS — G43.909 MIGRAINE: Primary | ICD-10-CM

## 2025-05-16 PROCEDURE — 99213 OFFICE O/P EST LOW 20 MIN: CPT | Performed by: PSYCHIATRY & NEUROLOGY

## 2025-05-16 RX ORDER — LANOLIN ALCOHOL/MO/W.PET/CERES
400 CREAM (GRAM) TOPICAL DAILY
Qty: 90 TABLET | Refills: 0 | Status: SHIPPED | OUTPATIENT
Start: 2025-05-16

## 2025-05-16 RX ORDER — KETOCONAZOLE 20 MG/G
CREAM TOPICAL
COMMUNITY
Start: 2025-05-13

## 2025-05-16 NOTE — ASSESSMENT & PLAN NOTE
Patient with history of migraines. She has been taking topamax which she has found helpful. She is currently getting 2-3 headaches in a week. Prior to the topamax it was a 4. Patient endorses having tension headaches from muscle tightness. Recommend starting magnesium oxide 400 mg. Otherwise, continue current management with topamax 50 mg bid. Follow up in 6 months.   Orders:    magnesium Oxide (MAG-OX) 400 mg TABS; Take 1 tablet (400 mg total) by mouth daily

## 2025-05-16 NOTE — PROGRESS NOTES
Name: Silvina Barr      : 1985      MRN: [id number]  Encounter Provider: Rosario Fermin MD  Encounter Date: 2025   Encounter department: [facility] [place]  :  Assessment & Plan  Migraine  Patient with history of migraines. She has been taking topamax which she has found helpful. She is currently getting 2-3 headaches in a week. Prior to the topamax it was a 4. Patient endorses having tension headaches from muscle tightness. Recommend starting magnesium oxide 400 mg. Otherwise, continue current management with topamax 50 mg bid. Follow up in 6 months.   Orders:    magnesium Oxide (MAG-OX) 400 mg TABS; Take 1 tablet (400 mg total) by mouth daily          History of Present Illness   HPI   Patient is a very pleasant 40-year-old female with history of recurrent pregnancy loss and endometritis who presents for headache follow up.       Initial visit (2022):  Reports frontal headache associated with photophobia and phonophobia.  Headaches improve with ibuprofen.  She has noted a more intense headache when endometritis goes untreated.  Blood work as well as MRI inconclusive for etiology.  ENT workup negative.  Physical exam normal.     At this time, possible that patient may be having vestibular migraines.  At this time we are limited with treatment given that patient is actively trying to conceive.  We will do an MRA brain and VS of the carotids to look for fibromuscular dysplasia.  Can continue Excedrin migraine as well as Tylenol and Compazine for headaches.  Advised to continue magnesium and riboflavin.  Once done with childbearing can consider other options.     Prior encounters:  2023: Patient with a frontal headache associated with photophobia and phonophobia that was worse when endometritis goes untreated and better during pregnancy.  Blood work as well as MRI inconclusive for etiology.  ENT workup negative. GRETCHEN positive. MRI and VAS ordered to look for  fibromuscular dysplasia and rule out vascular pathology which was negative. A more conservative approach in terms of treatment done given patient was actively trying to conceive, which she achieved shortly after. Patient reports migraines have been better during pregnancy period. She had one isolated episode of cervicalgia type pain which lasted a few days, did not improve with tylenol but improved with massage therapy.      1/2/2025: She has been getting 12-15 headache days in a month. Half of the headaches are less intense and respond well to motrin and Excedrin. Headaches associated with nausea and dizziness less so. Patient recently found out she was pregnant and should be approximately 4 weeks.     Prior workup:     MRA AND OR MRV HEAD WO CONTRAST (Final) 11/23/2022  7:04 PM     Impression  No intracranial aneurysm or major intracranial arterial stenosis.  No luminal irregularity to confirm underlying vasculopathy by MRA technique.        MRI BRAIN IAC WO CONTRAST (Final) 10/26/2019  8:29 AM     Impression  No acute intracranial abnormality.     No IAC pathology identified on this noncontrast study.        Prior treatment:     Abortive:   Motrin   Excedrin      Preventative:  Topamax        Interval history:  Topamax working well for her   Still has some headaches. Believes it is working for migraines. Can have tension headaches from muscle tightness through the neck. Can have sinus headaches from allergies   Before the topamax was having 4 a week. Now 2 or 3 after. No N/V no light sensitivity       Review of Systems   Constitutional:  Negative for appetite change, fatigue and fever.   HENT: Negative.  Negative for hearing loss, tinnitus, trouble swallowing and voice change.    Eyes: Negative.  Negative for photophobia, pain and visual disturbance.   Respiratory: Negative.  Negative for shortness of breath.    Cardiovascular: Negative.  Negative for palpitations.   Gastrointestinal: Negative.  Negative for  "nausea and vomiting.   Endocrine: Negative.  Negative for cold intolerance.   Genitourinary: Negative.  Negative for dysuria, frequency and urgency.   Musculoskeletal:  Negative for back pain, gait problem, myalgias, neck pain and neck stiffness.   Skin: Negative.  Negative for rash.   Allergic/Immunologic: Negative.    Neurological:  Negative for dizziness, tremors, seizures, syncope, facial asymmetry, speech difficulty, weakness, light-headedness, numbness and headaches.   Hematological: Negative.  Does not bruise/bleed easily.   Psychiatric/Behavioral: Negative.  Negative for confusion, hallucinations and sleep disturbance.    All other systems reviewed and are negative.   I have personally reviewed the MA's review of systems and made changes as necessary.         Objective   Ht 5' 6\" (1.676 m)   BMI 25.50 kg/m²     Physical Exam  Neurological Exam          "

## 2025-07-25 ENCOUNTER — DOCUMENTATION (OUTPATIENT)
Dept: OBGYN CLINIC | Facility: CLINIC | Age: 40
End: 2025-07-25

## 2025-07-25 ENCOUNTER — ANNUAL EXAM (OUTPATIENT)
Dept: OBGYN CLINIC | Facility: CLINIC | Age: 40
End: 2025-07-25
Payer: COMMERCIAL

## 2025-07-25 VITALS — BODY MASS INDEX: 23.63 KG/M2 | WEIGHT: 147 LBS | HEIGHT: 66 IN

## 2025-07-25 DIAGNOSIS — Z01.419 WELL WOMAN EXAM WITH ROUTINE GYNECOLOGICAL EXAM: Primary | ICD-10-CM

## 2025-07-25 DIAGNOSIS — F41.9 ANXIETY: Primary | ICD-10-CM

## 2025-07-25 DIAGNOSIS — F41.9 ANXIETY: ICD-10-CM

## 2025-07-25 DIAGNOSIS — N92.6 IRREGULAR BLEEDING: ICD-10-CM

## 2025-07-25 DIAGNOSIS — Z12.31 ENCOUNTER FOR SCREENING MAMMOGRAM FOR MALIGNANT NEOPLASM OF BREAST: ICD-10-CM

## 2025-07-25 PROBLEM — S63.519A SPRAIN OF SCAPHOLUNATE LIGAMENT: Status: ACTIVE | Noted: 2022-05-17

## 2025-07-25 PROCEDURE — S0612 ANNUAL GYNECOLOGICAL EXAMINA: HCPCS | Performed by: OBSTETRICS & GYNECOLOGY

## 2025-07-25 NOTE — PROGRESS NOTES
Annual Wellness Visit  Name: Silvina Barr      : 1985      MRN: [id number]  Encounter Provider: Bryanna Valentino DO  Encounter Date: 2025   Encounter department: [facility]    40 y.o.  yo presents today for her annual exam.:  Assessment & Plan  Well woman exam with routine gynecological exam    Orders:    Mammo screening bilateral w 3d and cad; Future    Encounter for screening mammogram for malignant neoplasm of breast    Orders:    Mammo screening bilateral w 3d and cad; Future    Irregular bleeding    Orders:    US pelvis complete w transvaginal; Future    Anxiety    Orders:    Ambulatory Referral to Family Practice; Future          The following were reviewed in today's visit: ASCCP guidelines, Gardasil vaccination, STD testing breast self exam, mammography screening ordered, family planning choices, menopause, and exercise.    Patient to return to office in yearly for annual exam.     All questions have been answered to her satisfaction.        CC:  Annual Gynecologic Examination  Chief Complaint   Patient presents with    Gynecologic Exam     Pt is here for her yearly exam. mammo ordered   2018 NILM, neg hpv  Mammo scheduled 25         HPI: Silvina Barr is a 40 y.o.  who presents for annual gynecologic examination.  She has the following concerns:  perimenopause. It's a been a stressful year. Scored high on depression and anxiety screen. Did zoloft in the past but wants to get on something that can help all her symptoms. Referred to [facility] as she wants a new PCP.     Periods stop and start over the course of 5-7 days. No intermenstrual bleeding. Sometimes comes a few days early.       Health Maintenance:    Exercise: frequently  Breast exams/breast awareness: occasionally    Last mammogram: 2025    Past Medical History[1]    Past Surgical History[2]    Past OB/Gyn History:   Patient's last menstrual period was  07/15/2025 (exact date).    Last Pap: 2023 : no abnormalities  History of abnormal Pap smear: No  HPV vaccine completed: no    Patient is currently sexually active.   STD testing: no  Current contraception: rhythm method      Family History  Family History[3]    Family history of uterine or ovarian cancer: no  Family history of breast cancer: yes  Family history of colon cancer: yes    Social History:  Social History     Socioeconomic History    Marital status: /Civil Union     Spouse name: Not on file    Number of children: Not on file    Years of education: Not on file    Highest education level: Not on file   Occupational History    Not on file   Tobacco Use    Smoking status: Never    Smokeless tobacco: Never   Vaping Use    Vaping status: Never Used   Substance and Sexual Activity    Alcohol use: Not Currently     Comment: Denies    Drug use: Never    Sexual activity: Not Currently     Partners: Male     Birth control/protection: Abstinence, None     Comment: x 13 years George   Other Topics Concern    Not on file   Social History Narrative    Always uses seat belt    Feels safe at home         Consumes 2 cups of coffee per day     Social Drivers of Health     Financial Resource Strain: Not on file   Food Insecurity: Not on file   Transportation Needs: Not on file   Physical Activity: Not on file   Stress: Not on file   Social Connections: Not on file   Intimate Partner Violence: Not on file   Housing Stability: Not on file     Domestic violence screen: negative    Allergies:  Allergies[4]    Medications:  Current Medications[5]    Review of Systems:  Review of Systems   Respiratory:  Negative for shortness of breath.    Cardiovascular:  Negative for chest pain.   Gastrointestinal:  Negative for abdominal distention, abdominal pain, blood in stool and constipation.   Genitourinary:  Negative for difficulty urinating, dyspareunia, dysuria, frequency, menstrual problem, pelvic pain, urgency, vaginal  "bleeding, vaginal discharge and vaginal pain.   Neurological:  Negative for headaches.         Physical Exam:  Ht 5' 6\" (1.676 m)   Wt 66.7 kg (147 lb)   LMP 07/15/2025 (Exact Date)   BMI 23.73 kg/m²    Physical Exam  Constitutional:       General: She is awake.      Appearance: Normal appearance. She is well-developed.   Genitourinary:      Vulva, bladder and urethral meatus normal.      Right Labia: No rash, tenderness or lesions.     Left Labia: No tenderness, lesions or rash.     No labial fusion noted.      No vaginal discharge, erythema, tenderness or bleeding.      No vaginal prolapse present.     No vaginal atrophy present.       Right Adnexa: not tender, not full and no mass present.     Left Adnexa: not tender, not full and no mass present.     Cervix is parous.      No cervical motion tenderness, discharge, lesion or polyp.      Uterus is not enlarged, tender or irregular.      No uterine mass detected.     No urethral prolapse present.      Bladder is not tender.       Pelvic exam was performed with patient in the lithotomy position.   Breasts:     Right: No inverted nipple, mass, nipple discharge, skin change or tenderness.      Left: No inverted nipple, mass, nipple discharge, skin change or tenderness.   HENT:      Head: Normocephalic and atraumatic.     Cardiovascular:      Rate and Rhythm: Normal rate and regular rhythm.      Heart sounds: Normal heart sounds.   Pulmonary:      Effort: Pulmonary effort is normal. No tachypnea or respiratory distress.      Breath sounds: Normal breath sounds.   Abdominal:      General: Abdomen is flat. There is no distension.      Palpations: Abdomen is soft.      Tenderness: There is no abdominal tenderness. There is no guarding or rebound.     Musculoskeletal:      Cervical back: Neck supple.   Lymphadenopathy:      Upper Body:      Right upper body: No supraclavicular or axillary adenopathy.      Left upper body: No supraclavicular or axillary adenopathy. "     Neurological:      General: No focal deficit present.      Mental Status: She is alert and oriented to person, place, and time.     Psychiatric:         Mood and Affect: Mood normal.         Behavior: Behavior normal.         Thought Content: Thought content normal.         Judgment: Judgment normal.   Vitals reviewed.                                    [1]   Past Medical History:  Diagnosis Date    Allergic     Seasonal, fall and spring    Allergy     seasonal allergy    Anxiety     Chronic endometritis     Cold intolerance     Resolved 12/15/2015     Gestational thrombocytopenia (HCC) 2020    Headache(784.0)     Headache, tension-type Since I was a child    Migraine     Miscarriage     x 5    Pelvic pressure in female     Resolved 12/15/2015     PONV (postoperative nausea and vomiting)     Patient requests to be pre-medicated for N/V prior to all surgeries. Also notes some light-headedness and slow to awake s/p anesthesia    Recurrent pregnancy loss     Status post primary low transverse  section at 34 weeks  2023    Varicella 1989    as a child    Vomiting during pregnancy     Resolved 12/15/2015     Wears glasses    [2]   Past Surgical History:  Procedure Laterality Date    CONDYLOMA EXCISION/FULGURATION  May 2007    during her first pregnancy; 2017    CYST REMOVAL      HYSTEROSCOPY N/A 2022    Procedure: HYSTEROSCOPY W/ ENDOMETRIAL BIOPSY;  Surgeon: Tara Budinetz, DO;  Location: [facility];  Service: Gynecology    POLYPECTOMY N/A 2022    Procedure: POLYPECTOMY;  Surgeon: Tara Budinetz, DO;  [facility]: [facility];  Service: Gynecology    POPLITEAL SYNOVIAL CYST EXCISION      Resolved 2007     OK  DELIVERY ONLY N/A 2023    Procedure:  SECTION ();  Surgeon: Bryanna Valentino DO;  [facility]: [facility];  Service: Obstetrics    OK HYSTEROSCOPY BX ENDOMETRIUM&/POLYPC W/WO D&C N/A 10/21/2019    Procedure: HYSTEROSCOPY; BIOPSY (MYOSURE); polypectomy;   Surgeon: Tara Budinetz, DO;  [facility]: [facility];  Service: Gynecology    [place] TX MISSED  FIRST TRIMESTER SURGICAL N/A 2025    Procedure: EXAM UNDER ANESTHESIA; DILATATION AND EVACUATION (D&E) (8 WEEKS);  Surgeon: Hanh Gu MD;  [facility]: [facility];  Service: Gynecology    WISDOM TOOTH EXTRACTION     [3]   Family History  Problem Relation Name Age of Onset    Migraines Mother Rosa Elena Bartholomew     Miscarriages / Stillbirths Mother Rosa Elena Bartholomew         1 miscarriage    Allergies Mother Rosa Elena Bartholomew     Sjogren's syndrome Mother Rosa Elena Bartholomew     Stroke Mother Rosa Elena Puma 52        TIA    Depression Mother Rosa Elena Bartholomew     Autoimmune disease Mother Rosa Elena Bartholomew     Colon cancer Father George Bartholomew     Skin cancer Father George Bartholomew     Depression Father George Bartholomew     ADD / ADHD Brother Tacos Bartholomew     Autism Brother      No Known Problems Daughter      No Known Problems Daughter      No Known Problems Daughter      No Known Problems Son      No Known Problems Son      No Known Problems Son      Dementia Maternal Grandmother Elebernardawill McdonoughBryan     Lung cancer Maternal Grandfather Reji Tony     Miscarriages / Stillbirths Paternal Grandmother Delaney Bartholomew         2 miscarriages    Dementia Paternal Grandfather Roman Bartholomew     Hearing loss Paternal Grandfather Roman Bartholomew     Breast cancer Maternal Aunt      Ovarian cancer Neg Hx     [4]   Allergies  Allergen Reactions    Cefaclor Hives   [5]   Current Outpatient Medications:     ketoconazole (NIZORAL) 2 % cream, , Disp: , Rfl:     magnesium Oxide (MAG-OX) 400 mg TABS, Take 1 tablet (400 mg total) by mouth daily, Disp: 90 tablet, Rfl: 0    prochlorperazine (COMPAZINE) 10 mg tablet, Take 1 tablet (10 mg total) by mouth every 6 (six) hours as needed for nausea or vomiting, Disp: 30 tablet, Rfl: 0    topiramate (Topamax) 50 MG tablet, Take 1 tablet (50 mg total) by mouth 2 (two) times a day, Disp: 60 tablet, Rfl: 6

## 2025-08-01 ENCOUNTER — HOSPITAL ENCOUNTER (OUTPATIENT)
Dept: MAMMOGRAPHY | Facility: MEDICAL CENTER | Age: 40
Discharge: HOME | End: 2025-08-01
Payer: COMMERCIAL

## 2025-08-01 VITALS — WEIGHT: 147 LBS | HEIGHT: 66 IN | BODY MASS INDEX: 23.63 KG/M2

## 2025-08-01 DIAGNOSIS — Z12.31 SCREENING MAMMOGRAM FOR BREAST CANCER: ICD-10-CM

## 2025-08-01 PROCEDURE — 77063 BREAST TOMOSYNTHESIS BI: CPT

## 2025-08-01 PROCEDURE — 77067 SCR MAMMO BI INCL CAD: CPT

## 2025-08-17 DIAGNOSIS — F41.9 ANXIETY: ICD-10-CM

## (undated) DEVICE — LAPAROTOMY SPONGE - RF AND X-RAY DETECTABLE PRE-WASHED: Brand: SITUATE

## (undated) DEVICE — COLLECTION SET, DISPOSABLE WITH HANDLE AND TAPERED FITTINGS TUBING, 6 FT (183 CM) (10/PK): Brand: GYRUS ACMI

## (undated) DEVICE — D + E CONNECTION HOSE

## (undated) DEVICE — SUT STRATAFIX SPIRAL 4-0 PGA/PCL 30 X 30 CM SXMD2B409

## (undated) DEVICE — SURGICEL NU-KNIT 3 X 4

## (undated) DEVICE — CURETTE VACURETTE CRVD 7MM

## (undated) DEVICE — LIGHT GLOVE GREEN

## (undated) DEVICE — STRL ALLENTOWN HYSTEROSCOPY PK: Brand: CARDINAL HEALTH

## (undated) DEVICE — IV EXTENSION TUBING 33 IN

## (undated) DEVICE — CHLORHEXIDINE 4PCT 4 OZ

## (undated) DEVICE — PREMIUM DRY TRAY LF: Brand: MEDLINE INDUSTRIES, INC.

## (undated) DEVICE — CYSTO TUBING SINGLE IRRIGATION

## (undated) DEVICE — ENDOMETRIAL BX PIPELLE

## (undated) DEVICE — DEVICE MYOSURE TISSUE REMOVAL HYSTEROSCOPIC

## (undated) DEVICE — GLOVE PI ULTRA TOUCH SZ.7.0

## (undated) DEVICE — GLOVE PI ULTRA TOUCH SZ.6.5

## (undated) DEVICE — GLOVE SRG BIOGEL ECLIPSE 6.5

## (undated) DEVICE — PVC URETHRAL CATHETER: Brand: DOVER

## (undated) DEVICE — Device

## (undated) DEVICE — D + E SUCTION CANISTER

## (undated) DEVICE — DRAPE EQUIPMENT RF WAND

## (undated) DEVICE — LIGHT HANDLE COVER SLEEVE DISP BLUE STELLAR

## (undated) DEVICE — GLOVE INDICATOR PI UNDERGLOVE SZ 7.5 BLUE

## (undated) DEVICE — CHLORAPREP HI-LITE 26ML ORANGE

## (undated) DEVICE — SUT MONOCRYL 0 CTX 36 IN Y398H

## (undated) DEVICE — GLOVE INDICATOR PI UNDERGLOVE SZ 7 BLUE

## (undated) DEVICE — STERILE LUBRICATING JELLY, TUBE: Brand: HR LUBRICATING JELLY

## (undated) DEVICE — PACK FLUENT DISP

## (undated) DEVICE — UNDER BUTTOCKS DRAPE W/FLUID CONTROL POUCH: Brand: CONVERTORS

## (undated) DEVICE — GLOVE SRG BIOGEL ECLIPSE 7

## (undated) DEVICE — D + E SAFE TOUCH TISSUE TRAP (CIRCON)

## (undated) DEVICE — 2000CC GUARDIAN II: Brand: GUARDIAN

## (undated) DEVICE — SUT PLAIN 3-0 CT-1 27 IN 842H

## (undated) DEVICE — STERILE SURGICAL LUBRICANT,  TUBE: Brand: SURGILUBE

## (undated) DEVICE — SUT STRATAFIX SPIRAL PDS PLUS 1 CTX 18 IN SXPP1A400

## (undated) DEVICE — SPECIMEN SOCK - SHORT: Brand: MEDI-VAC

## (undated) DEVICE — SPONGE LAP 18 X 18 IN

## (undated) DEVICE — SKIN MARKER DUAL TIP WITH RULER CAP, FLEXIBLE RULER AND LABELS: Brand: DEVON

## (undated) DEVICE — PACK C-SECTION PBDS